# Patient Record
Sex: FEMALE | Race: WHITE | NOT HISPANIC OR LATINO | Employment: FULL TIME | URBAN - METROPOLITAN AREA
[De-identification: names, ages, dates, MRNs, and addresses within clinical notes are randomized per-mention and may not be internally consistent; named-entity substitution may affect disease eponyms.]

---

## 2018-10-29 ENCOUNTER — OFFICE VISIT (OUTPATIENT)
Dept: FAMILY MEDICINE | Facility: CLINIC | Age: 43
End: 2018-10-29
Payer: COMMERCIAL

## 2018-10-29 VITALS
DIASTOLIC BLOOD PRESSURE: 61 MMHG | SYSTOLIC BLOOD PRESSURE: 134 MMHG | OXYGEN SATURATION: 99 % | RESPIRATION RATE: 18 BRPM | HEART RATE: 87 BPM | WEIGHT: 221.63 LBS | HEIGHT: 59 IN | BODY MASS INDEX: 44.68 KG/M2 | TEMPERATURE: 99 F

## 2018-10-29 DIAGNOSIS — Z12.39 SCREENING FOR MALIGNANT NEOPLASM OF BREAST: ICD-10-CM

## 2018-10-29 DIAGNOSIS — Z79.899 ENCOUNTER FOR LONG-TERM CURRENT USE OF MEDICATION: ICD-10-CM

## 2018-10-29 DIAGNOSIS — R60.0 BILATERAL LOWER EXTREMITY EDEMA: Primary | ICD-10-CM

## 2018-10-29 PROCEDURE — 99203 OFFICE O/P NEW LOW 30 MIN: CPT | Mod: S$GLB,,, | Performed by: FAMILY MEDICINE

## 2018-10-29 PROCEDURE — 3008F BODY MASS INDEX DOCD: CPT | Mod: CPTII,S$GLB,, | Performed by: FAMILY MEDICINE

## 2018-10-29 NOTE — PROGRESS NOTES
Subjective:       Patient ID: Nereida Narayanan is a 43 y.o. female.    Chief Complaint: Establish Care    HPI   Patient presents to establish care. Complains of bilateral leg swelling for several years, worse over the past year since starting to work 16 hour days on her feet all day. Denies any shortness of breath or any significant change in her weight. Does not pay attention to salt, sugar, or fat intake and works in fast food services.    Has never had a mammogram. Does not want a flu shot today.     Review of Systems   Constitutional: Negative for chills, fatigue, fever and unexpected weight change.   Respiratory: Negative for cough, chest tightness, shortness of breath and wheezing.    Cardiovascular: Positive for leg swelling. Negative for chest pain and palpitations.   Neurological: Negative for dizziness, tremors, seizures and headaches.       History reviewed. No pertinent past medical history.  Past Surgical History:   Procedure Laterality Date     SECTION       Social History     Socioeconomic History    Marital status: Single     Spouse name: Not on file    Number of children: Not on file    Years of education: Not on file    Highest education level: Not on file   Social Needs    Financial resource strain: Not on file    Food insecurity - worry: Not on file    Food insecurity - inability: Not on file    Transportation needs - medical: Not on file    Transportation needs - non-medical: Not on file   Occupational History    Not on file   Tobacco Use    Smoking status: Never Smoker    Smokeless tobacco: Never Used   Substance and Sexual Activity    Alcohol use: No     Frequency: Never    Drug use: No    Sexual activity: No   Other Topics Concern    Not on file   Social History Narrative    Not on file     Family History   Problem Relation Age of Onset    Hypertension Mother        Objective:      /61 (BP Location: Right arm, Patient Position: Sitting, BP Method: Medium  "(Automatic))   Pulse 87   Temp 98.6 °F (37 °C) (Oral)   Resp 18   Ht 4' 11" (1.499 m)   Wt 100.5 kg (221 lb 9.6 oz)   LMP 10/08/2018 (Exact Date)   SpO2 99%   BMI 44.76 kg/m²   Physical Exam   Constitutional: She is oriented to person, place, and time. She appears well-developed and well-nourished. No distress.   obese   Cardiovascular: Normal rate, regular rhythm and normal heart sounds.   No murmur heard.  Pulmonary/Chest: Effort normal and breath sounds normal. No stridor. No respiratory distress.   Neurological: She is alert and oriented to person, place, and time.   Skin: She is not diaphoretic.   Psychiatric: She has a normal mood and affect. Her behavior is normal. Judgment and thought content normal.   Vitals reviewed.      Assessment:       1. Bilateral lower extremity edema    2. Encounter for long-term current use of medication    3. Screening for malignant neoplasm of breast        Plan:       Bilateral lower extremity edema  - suspect this is due to varicose veins; patient encouraged to wear compression stockings to work  - contact patient when fasting labs have been resulted    Encounter for long-term current use of medication  -     Lipid panel; Future; Expected date: 10/29/2018  -     Comprehensive metabolic panel; Future; Expected date: 10/29/2018  -     TSH; Future; Expected date: 10/29/2018  -     T4, free; Future; Expected date: 10/29/2018  -     CBC auto differential; Future; Expected date: 10/29/2018    Screening for malignant neoplasm of breast  -  -     Mammo Digital Screening Bilateral With CAD; Future; Expected date: 10/29/2018            Risks, benefits, and side effects were discussed with the patient. All questions were answered to the fullest satisfaction of the patient, and pt verbalized understanding and agreement to treatment plan. Pt was to call with any new or worsening symptoms, or present to the ER.    "

## 2018-10-30 ENCOUNTER — LAB VISIT (OUTPATIENT)
Dept: LAB | Facility: HOSPITAL | Age: 43
End: 2018-10-30
Attending: FAMILY MEDICINE
Payer: COMMERCIAL

## 2018-10-30 DIAGNOSIS — Z79.899 ENCOUNTER FOR LONG-TERM CURRENT USE OF MEDICATION: ICD-10-CM

## 2018-10-30 LAB
ALBUMIN SERPL BCP-MCNC: 3.7 G/DL
ALP SERPL-CCNC: 47 U/L
ALT SERPL W/O P-5'-P-CCNC: 21 U/L
ANION GAP SERPL CALC-SCNC: 7 MMOL/L
AST SERPL-CCNC: 21 U/L
BASOPHILS # BLD AUTO: 0.07 K/UL
BASOPHILS NFR BLD: 1.1 %
BILIRUB SERPL-MCNC: 0.3 MG/DL
BUN SERPL-MCNC: 17 MG/DL
CALCIUM SERPL-MCNC: 9.4 MG/DL
CHLORIDE SERPL-SCNC: 105 MMOL/L
CHOLEST SERPL-MCNC: 187 MG/DL
CHOLEST/HDLC SERPL: 4.6 {RATIO}
CO2 SERPL-SCNC: 24 MMOL/L
CREAT SERPL-MCNC: 0.6 MG/DL
DIFFERENTIAL METHOD: ABNORMAL
EOSINOPHIL # BLD AUTO: 0.3 K/UL
EOSINOPHIL NFR BLD: 4 %
ERYTHROCYTE [DISTWIDTH] IN BLOOD BY AUTOMATED COUNT: 13.6 %
EST. GFR  (AFRICAN AMERICAN): >60 ML/MIN/1.73 M^2
EST. GFR  (NON AFRICAN AMERICAN): >60 ML/MIN/1.73 M^2
GLUCOSE SERPL-MCNC: 83 MG/DL
HCT VFR BLD AUTO: 36.1 %
HDLC SERPL-MCNC: 41 MG/DL
HDLC SERPL: 21.9 %
HGB BLD-MCNC: 11.7 G/DL
IMM GRANULOCYTES # BLD AUTO: 0.04 K/UL
IMM GRANULOCYTES NFR BLD AUTO: 0.6 %
LDLC SERPL CALC-MCNC: 123.2 MG/DL
LYMPHOCYTES # BLD AUTO: 2.3 K/UL
LYMPHOCYTES NFR BLD: 34.6 %
MCH RBC QN AUTO: 28.1 PG
MCHC RBC AUTO-ENTMCNC: 32.4 G/DL
MCV RBC AUTO: 87 FL
MONOCYTES # BLD AUTO: 0.5 K/UL
MONOCYTES NFR BLD: 7.1 %
NEUTROPHILS # BLD AUTO: 3.4 K/UL
NEUTROPHILS NFR BLD: 52.6 %
NONHDLC SERPL-MCNC: 146 MG/DL
NRBC BLD-RTO: 0 /100 WBC
PLATELET # BLD AUTO: 284 K/UL
PMV BLD AUTO: 10.7 FL
POTASSIUM SERPL-SCNC: 3.9 MMOL/L
PROT SERPL-MCNC: 7.3 G/DL
RBC # BLD AUTO: 4.16 M/UL
SODIUM SERPL-SCNC: 136 MMOL/L
T4 FREE SERPL-MCNC: 0.9 NG/DL
TRIGL SERPL-MCNC: 114 MG/DL
TSH SERPL DL<=0.005 MIU/L-ACNC: 3.78 UIU/ML
WBC # BLD AUTO: 6.51 K/UL

## 2018-10-30 PROCEDURE — 80053 COMPREHEN METABOLIC PANEL: CPT

## 2018-10-30 PROCEDURE — 36415 COLL VENOUS BLD VENIPUNCTURE: CPT

## 2018-10-30 PROCEDURE — 85025 COMPLETE CBC W/AUTO DIFF WBC: CPT

## 2018-10-30 PROCEDURE — 84443 ASSAY THYROID STIM HORMONE: CPT

## 2018-10-30 PROCEDURE — 80061 LIPID PANEL: CPT

## 2018-10-30 PROCEDURE — 84439 ASSAY OF FREE THYROXINE: CPT

## 2018-12-11 ENCOUNTER — OFFICE VISIT (OUTPATIENT)
Dept: FAMILY MEDICINE | Facility: CLINIC | Age: 43
End: 2018-12-11
Payer: COMMERCIAL

## 2018-12-11 VITALS
BODY MASS INDEX: 44.92 KG/M2 | OXYGEN SATURATION: 98 % | TEMPERATURE: 98 F | RESPIRATION RATE: 18 BRPM | SYSTOLIC BLOOD PRESSURE: 135 MMHG | HEIGHT: 59 IN | WEIGHT: 222.81 LBS | DIASTOLIC BLOOD PRESSURE: 69 MMHG | HEART RATE: 75 BPM

## 2018-12-11 DIAGNOSIS — D50.9 IRON DEFICIENCY ANEMIA, UNSPECIFIED IRON DEFICIENCY ANEMIA TYPE: Primary | ICD-10-CM

## 2018-12-11 DIAGNOSIS — I87.2 VENOUS INSUFFICIENCY OF BOTH LOWER EXTREMITIES: ICD-10-CM

## 2018-12-11 PROCEDURE — 99213 OFFICE O/P EST LOW 20 MIN: CPT | Mod: S$GLB,,, | Performed by: FAMILY MEDICINE

## 2018-12-11 PROCEDURE — 3008F BODY MASS INDEX DOCD: CPT | Mod: CPTII,S$GLB,, | Performed by: FAMILY MEDICINE

## 2018-12-11 NOTE — PROGRESS NOTES
"Subjective:       Patient ID: Nereida Narayanan is a 43 y.o. female.    Chief Complaint: Follow-up    HPI   Ms. Narayanan presents for follow-up. Reports some improvement in her swelling with compression stockings, which she wears most days. Still without chest pain and shortness of breath. Labs reviewed with patient - hgb and hct are low, and she has needed to take iron in the past. Not currently taking any iron.    Review of Systems   Constitutional: Negative for diaphoresis, fatigue, fever and unexpected weight change.   Respiratory: Negative for cough, shortness of breath, wheezing and stridor.    Cardiovascular: Positive for leg swelling. Negative for chest pain and palpitations.       Past Medical History:   Diagnosis Date    Morbid obesity with BMI of 45.0-49.9, adult      Past Surgical History:   Procedure Laterality Date     SECTION       Social History     Socioeconomic History    Marital status: Single     Spouse name: Not on file    Number of children: Not on file    Years of education: Not on file    Highest education level: Not on file   Social Needs    Financial resource strain: Not on file    Food insecurity - worry: Not on file    Food insecurity - inability: Not on file    Transportation needs - medical: Not on file    Transportation needs - non-medical: Not on file   Occupational History    Not on file   Tobacco Use    Smoking status: Never Smoker    Smokeless tobacco: Never Used   Substance and Sexual Activity    Alcohol use: No     Frequency: Never    Drug use: No    Sexual activity: No   Other Topics Concern    Not on file   Social History Narrative    Not on file     Family History   Problem Relation Age of Onset    Hypertension Mother        Objective:      /69 (BP Location: Right arm, Patient Position: Sitting, BP Method: Medium (Automatic))   Pulse 75   Temp 98.1 °F (36.7 °C) (Oral)   Resp 18   Ht 4' 11" (1.499 m)   Wt 101.1 kg (222 lb 12.8 oz)   LMP  " (LMP Unknown)   SpO2 98%   BMI 45.00 kg/m²   Physical Exam   Constitutional: She is oriented to person, place, and time. She appears well-developed and well-nourished. No distress.   Morbidly obese   Eyes: Conjunctivae and EOM are normal. Pupils are equal, round, and reactive to light. No scleral icterus.   Pulmonary/Chest: Effort normal and breath sounds normal. No stridor. No respiratory distress.   Neurological: She is alert and oriented to person, place, and time.   Skin: She is not diaphoretic.   Psychiatric: She has a normal mood and affect. Her behavior is normal. Judgment and thought content normal.   Vitals reviewed.      Assessment:       1. Iron deficiency anemia, unspecified iron deficiency anemia type    2. Venous insufficiency of both lower extremities        Plan:       Iron deficiency anemia, unspecified iron deficiency anemia type  - patient to start taking otc iron and add a stool softener if needed    Venous insufficiency of both lower extremities  -     Ultrasound Lower Extremity Veins Bilateral Insuf (Cupid Only); Future  -     Continue wearing compression stockings            Risks, benefits, and side effects were discussed with the patient. All questions were answered to the fullest satisfaction of the patient, and pt verbalized understanding and agreement to treatment plan. Pt was to call with any new or worsening symptoms, or present to the ER.

## 2018-12-13 ENCOUNTER — PATIENT MESSAGE (OUTPATIENT)
Dept: FAMILY MEDICINE | Facility: CLINIC | Age: 43
End: 2018-12-13

## 2018-12-13 DIAGNOSIS — R60.9 EDEMA, UNSPECIFIED TYPE: Primary | ICD-10-CM

## 2018-12-14 ENCOUNTER — PATIENT MESSAGE (OUTPATIENT)
Dept: FAMILY MEDICINE | Facility: CLINIC | Age: 43
End: 2018-12-14

## 2018-12-14 RX ORDER — FUROSEMIDE 20 MG/1
20 TABLET ORAL DAILY PRN
Qty: 30 TABLET | Refills: 1 | Status: SHIPPED | OUTPATIENT
Start: 2018-12-14 | End: 2020-07-09 | Stop reason: SDUPTHER

## 2019-01-15 ENCOUNTER — TELEPHONE (OUTPATIENT)
Dept: FAMILY MEDICINE | Facility: CLINIC | Age: 44
End: 2019-01-15

## 2020-01-15 ENCOUNTER — CLINICAL SUPPORT (OUTPATIENT)
Dept: INTERNAL MEDICINE | Facility: CLINIC | Age: 45
End: 2020-01-15

## 2020-01-15 DIAGNOSIS — Z02.1 DRUG TESTING, PRE-EMPLOYMENT: Primary | ICD-10-CM

## 2020-01-15 PROCEDURE — 80305 PR NON-DOT DRUG SCREENS: ICD-10-PCS | Mod: ,,, | Performed by: NURSE PRACTITIONER

## 2020-01-15 PROCEDURE — 80305 DRUG TEST PRSMV DIR OPT OBS: CPT | Mod: ,,, | Performed by: NURSE PRACTITIONER

## 2020-05-20 ENCOUNTER — PATIENT MESSAGE (OUTPATIENT)
Dept: ADMINISTRATIVE | Facility: HOSPITAL | Age: 45
End: 2020-05-20

## 2020-06-01 DIAGNOSIS — Z12.39 BREAST CANCER SCREENING: ICD-10-CM

## 2020-06-25 ENCOUNTER — PATIENT OUTREACH (OUTPATIENT)
Dept: ADMINISTRATIVE | Facility: HOSPITAL | Age: 45
End: 2020-06-25

## 2020-06-25 NOTE — LETTER
June 25, 2020    Nereida Narayanan  [address] [facility] [place] [address]             [facility]  [address]  [facility] [address]  Phone: [phone] Dear Jessica [facility] is committed to your overall health and would like to ensure that you are up to date on your recommended test and/or procedures.   Neeru Blackwell DO  has found that your chart shows you may be due for the following:    Health Maintenance Due   Topic Date Due    HIV Screening  05/09/1992    TETANUS VACCINE  05/09/1995    Cervical Cancer Screening  05/09/1998    Mammogram  05/09/2017     If you have had any of the above done at another facility, please let us know so that we may obtain copies from that facility.  If you have a copy of these records, please provide a copy for us to scan into your chart.  You are welcome to request that the report be faxed to us at  ([phone]).     Otherwise, please schedule these appointments at your earliest convenience by calling [phone] or going to [email or web address].    If you have an upcoming scheduled appointment for the item above, please disregard this letter.    Sincerely,  Your [facility]  DO Love Pope L.P.N. Clinical Care Coordinator  [address] [place] [place] [address] [phone]

## 2020-07-09 ENCOUNTER — HOSPITAL ENCOUNTER (OUTPATIENT)
Dept: RADIOLOGY | Facility: HOSPITAL | Age: 45
Discharge: HOME | End: 2020-07-09
Attending: NURSE PRACTITIONER
Payer: COMMERCIAL

## 2020-07-09 ENCOUNTER — OFFICE VISIT (OUTPATIENT)
Dept: FAMILY MEDICINE | Facility: CLINIC | Age: 45
End: 2020-07-09
Payer: COMMERCIAL

## 2020-07-09 ENCOUNTER — PATIENT MESSAGE (OUTPATIENT)
Dept: FAMILY MEDICINE | Facility: CLINIC | Age: 45
End: 2020-07-09

## 2020-07-09 VITALS
HEART RATE: 76 BPM | WEIGHT: 239 LBS | OXYGEN SATURATION: 95 % | TEMPERATURE: 98 F | HEIGHT: 59 IN | RESPIRATION RATE: 18 BRPM | SYSTOLIC BLOOD PRESSURE: 118 MMHG | BODY MASS INDEX: 48.18 KG/M2 | DIASTOLIC BLOOD PRESSURE: 68 MMHG

## 2020-07-09 DIAGNOSIS — Z13.220 ENCOUNTER FOR LIPID SCREENING FOR CARDIOVASCULAR DISEASE: ICD-10-CM

## 2020-07-09 DIAGNOSIS — R60.0 BILATERAL LOWER EXTREMITY EDEMA: ICD-10-CM

## 2020-07-09 DIAGNOSIS — Z13.6 ENCOUNTER FOR LIPID SCREENING FOR CARDIOVASCULAR DISEASE: ICD-10-CM

## 2020-07-09 DIAGNOSIS — Z00.00 ROUTINE PHYSICAL EXAMINATION: ICD-10-CM

## 2020-07-09 DIAGNOSIS — M71.22 SYNOVIAL CYST OF LEFT POPLITEAL SPACE: Primary | ICD-10-CM

## 2020-07-09 DIAGNOSIS — Z11.4 SCREENING FOR HIV (HUMAN IMMUNODEFICIENCY VIRUS): Primary | ICD-10-CM

## 2020-07-09 PROCEDURE — 93922 US ARTERIAL LOWER EXTREMITY BILAT WITH ABI (XPD): ICD-10-PCS | Mod: 26,,, | Performed by: RADIOLOGY

## 2020-07-09 PROCEDURE — 99999 PR PBB SHADOW E&M-EST. PATIENT-LVL IV: ICD-10-PCS | Mod: PBBFAC,,, | Performed by: NURSE PRACTITIONER

## 2020-07-09 PROCEDURE — 93925 US ARTERIAL LOWER EXTREMITY BILAT WITH ABI (XPD): ICD-10-PCS | Mod: 26,,, | Performed by: RADIOLOGY

## 2020-07-09 PROCEDURE — 93970 EXTREMITY STUDY: CPT | Mod: 26,,, | Performed by: RADIOLOGY

## 2020-07-09 PROCEDURE — 93922 UPR/L XTREMITY ART 2 LEVELS: CPT | Mod: TC,PN

## 2020-07-09 PROCEDURE — 99214 PR OFFICE/OUTPT VISIT, EST, LEVL IV, 30-39 MIN: ICD-10-PCS | Mod: S$GLB,,, | Performed by: NURSE PRACTITIONER

## 2020-07-09 PROCEDURE — 99999 PR PBB SHADOW E&M-EST. PATIENT-LVL IV: CPT | Mod: PBBFAC,,, | Performed by: NURSE PRACTITIONER

## 2020-07-09 PROCEDURE — 93970 EXTREMITY STUDY: CPT | Mod: TC,PN

## 2020-07-09 PROCEDURE — 93922 UPR/L XTREMITY ART 2 LEVELS: CPT | Mod: 26,,, | Performed by: RADIOLOGY

## 2020-07-09 PROCEDURE — 93970 US LOWER EXTREMITY VEINS BILATERAL: ICD-10-PCS | Mod: 26,,, | Performed by: RADIOLOGY

## 2020-07-09 PROCEDURE — 99214 OFFICE O/P EST MOD 30 MIN: CPT | Mod: S$GLB,,, | Performed by: NURSE PRACTITIONER

## 2020-07-09 PROCEDURE — 93925 LOWER EXTREMITY STUDY: CPT | Mod: 26,,, | Performed by: RADIOLOGY

## 2020-07-09 RX ORDER — FUROSEMIDE 20 MG/1
20 TABLET ORAL DAILY PRN
Qty: 30 TABLET | Refills: 1 | Status: SHIPPED | OUTPATIENT
Start: 2020-07-09 | End: 2021-03-25 | Stop reason: SDUPTHER

## 2020-07-09 NOTE — PROGRESS NOTES
"Subjective:       Patient ID: Nereida Narayanan is a 45 y.o. female.    Chief Complaint: Joint Swelling (ankles and feet)    Ms. Nereida Narayanan is a 45 year old female who presents to the clinic today for lower leg edema. She has a history of this. She has taken lasix in the past, which was partially relieved her swelling. Reports leg in her right leg. Reports on her feet constantly. Reports she does exercise. Denies cp or sob. Denies chest pain or tightness.     Review of Systems   Constitutional: Negative for activity change, appetite change, fatigue and fever.   Respiratory: Negative for cough, chest tightness, shortness of breath and wheezing.    Cardiovascular: Positive for leg swelling. Negative for chest pain and palpitations.   Gastrointestinal: Negative for abdominal pain, diarrhea, nausea and vomiting.   Musculoskeletal: Negative for arthralgias and myalgias.   Skin: Negative for color change.   Neurological: Negative for dizziness, tremors, syncope, weakness and headaches.   Psychiatric/Behavioral: Negative for dysphoric mood and sleep disturbance.         Reviewed family, medical, surgical, and social history.    Objective:      /68 (BP Location: Left arm, Patient Position: Sitting, BP Method: Medium (Automatic))   Pulse 76   Temp 97.6 °F (36.4 °C) (Tympanic)   Resp 18   Ht 4' 11" (1.499 m)   Wt 108.4 kg (239 lb)   SpO2 95%   BMI 48.27 kg/m²   Physical Exam  Vitals signs reviewed.   Constitutional:       General: She is not in acute distress.     Appearance: She is well-developed. She is not diaphoretic.      Comments: obese   HENT:      Head: Normocephalic.      Right Ear: Tympanic membrane normal.      Left Ear: Tympanic membrane normal.      Nose: Nose normal.      Mouth/Throat:      Mouth: Mucous membranes are moist.      Pharynx: Oropharynx is clear.   Eyes:      Conjunctiva/sclera: Conjunctivae normal.      Pupils: Pupils are equal, round, and reactive to light.   Neck:      " Musculoskeletal: Normal range of motion.   Cardiovascular:      Rate and Rhythm: Normal rate and regular rhythm.      Pulses: Normal pulses.      Heart sounds: Normal heart sounds. No murmur.   Pulmonary:      Effort: Pulmonary effort is normal. No respiratory distress.      Breath sounds: Normal breath sounds. No stridor.   Abdominal:      General: Bowel sounds are normal.      Tenderness: There is no abdominal tenderness.   Musculoskeletal: Normal range of motion.   Skin:     General: Skin is warm.      Capillary Refill: Capillary refill takes less than 2 seconds.   Neurological:      General: No focal deficit present.      Mental Status: She is alert and oriented to person, place, and time.   Psychiatric:         Mood and Affect: Mood normal.         Behavior: Behavior normal.         Thought Content: Thought content normal.         Judgment: Judgment normal.         Assessment:       1. Screening for HIV (human immunodeficiency virus)    2. Bilateral lower extremity edema    3. Routine physical examination    4. Encounter for lipid screening for cardiovascular disease        Plan:       Screening for HIV (human immunodeficiency virus)  -     HIV 1/2 Ag/Ab (4th Gen); Future; Expected date: 12/25/2020    Bilateral lower extremity edema  -     US Lower Extrem Arteries Bilat with CRIS; Future; Expected date: 07/09/2020  -     US Lower Extremity Veins Bilateral; Future; Expected date: 07/09/2020  -     furosemide (LASIX) 20 MG tablet; Take 1 tablet (20 mg total) by mouth daily as needed (leg swelling).  Dispense: 30 tablet; Refill: 1    Routine physical examination  -     CBC auto differential; Future; Expected date: 07/16/2020  -     Comprehensive metabolic panel; Future; Expected date: 07/09/2020  -     TSH; Future; Expected date: 07/09/2020  -     Lipid Panel; Future; Expected date: 07/09/2020    Encounter for lipid screening for cardiovascular disease  -     Lipid Panel; Future; Expected date: 07/09/2020           PLAN:  - Discussed with patient the plan of care  - Obtain US  - Restart lasix  - Medications reviewed. Medication side effects discussed. Patient has no questions or concerns at this time. Informed patient to notify me regarding any concerns.   - Informed patient to please notify me with any questions or concerns at anytime  - Follow up ordered for well woman exam      Risks, benefits, and side effects were discussed with the patient. All questions were answered to the fullest satisfaction of the patient, and pt verbalized understanding and agreement to treatment plan. Pt was to call with any new or worsening symptoms, or present to the ER.

## 2020-07-09 NOTE — PROGRESS NOTES
Jules, her US of her legs are normal. Please have her obtain her labs, and then at her f/u for lab review we can discuss weight loss options  Thanks

## 2020-07-09 NOTE — PROGRESS NOTES
Please let Ms Oracio know that her US showed normal venous blood flow, no clots. However, it did show a popliteal cyst behind her left knee. I am going to refer to ortho and see if this can be injected or aspirated for relief  Thanks

## 2020-07-10 ENCOUNTER — TELEPHONE (OUTPATIENT)
Dept: ORTHOPEDICS | Facility: CLINIC | Age: 45
End: 2020-07-10

## 2020-07-15 ENCOUNTER — TELEPHONE (OUTPATIENT)
Dept: ORTHOPEDICS | Facility: CLINIC | Age: 45
End: 2020-07-15

## 2020-07-23 ENCOUNTER — OFFICE VISIT (OUTPATIENT)
Dept: FAMILY MEDICINE | Facility: CLINIC | Age: 45
End: 2020-07-23
Payer: COMMERCIAL

## 2020-07-23 VITALS
BODY MASS INDEX: 48.99 KG/M2 | WEIGHT: 243 LBS | HEART RATE: 88 BPM | TEMPERATURE: 97 F | OXYGEN SATURATION: 98 % | SYSTOLIC BLOOD PRESSURE: 134 MMHG | DIASTOLIC BLOOD PRESSURE: 74 MMHG | RESPIRATION RATE: 18 BRPM | HEIGHT: 59 IN

## 2020-07-23 DIAGNOSIS — Z01.419 WELL WOMAN EXAM WITH ROUTINE GYNECOLOGICAL EXAM: Primary | ICD-10-CM

## 2020-07-23 DIAGNOSIS — Z71.3 WEIGHT LOSS COUNSELING, ENCOUNTER FOR: ICD-10-CM

## 2020-07-23 PROCEDURE — 99999 PR PBB SHADOW E&M-EST. PATIENT-LVL III: CPT | Mod: PBBFAC,,, | Performed by: NURSE PRACTITIONER

## 2020-07-23 PROCEDURE — 87624 HPV HI-RISK TYP POOLED RSLT: CPT

## 2020-07-23 PROCEDURE — 99396 PR PREVENTIVE VISIT,EST,40-64: ICD-10-PCS | Mod: S$GLB,,, | Performed by: NURSE PRACTITIONER

## 2020-07-23 PROCEDURE — 99999 PR PBB SHADOW E&M-EST. PATIENT-LVL III: ICD-10-PCS | Mod: PBBFAC,,, | Performed by: NURSE PRACTITIONER

## 2020-07-23 PROCEDURE — 99396 PREV VISIT EST AGE 40-64: CPT | Mod: S$GLB,,, | Performed by: NURSE PRACTITIONER

## 2020-07-23 PROCEDURE — 88175 CYTOPATH C/V AUTO FLUID REDO: CPT

## 2020-07-23 RX ORDER — PHENTERMINE HYDROCHLORIDE 37.5 MG/1
37.5 TABLET ORAL
Qty: 30 TABLET | Refills: 0 | Status: SHIPPED | OUTPATIENT
Start: 2020-07-23 | End: 2020-08-18 | Stop reason: SDUPTHER

## 2020-07-23 NOTE — PROGRESS NOTES
"  Subjective:       Patient ID: Nereida Narayanan is a 45 y.o. female.    Chief Complaint: Gynecologic Exam    Ms. Nereida Narayanan is a 45 year old female who presents to the clinic today for well woman exam. No complaints. Not sexually active. LMP last month.     Patient requesting assistance with weight loss. BMI 49. She is going to the gym, and monitoring her diet. Patient requesting assistance with weight loss    HM:  Mammogram ordered and scheduled  - Labs: ordered    Gynecologic Exam  The patient's pertinent negatives include no genital itching, genital lesions, genital odor, genital rash, missed menses, pelvic pain, vaginal bleeding or vaginal discharge. Pertinent negatives include no abdominal pain, diarrhea, fever, headaches or vomiting.     Review of Systems   Constitutional: Negative for activity change, diaphoresis and fever.   Respiratory: Negative for cough, chest tightness, shortness of breath and wheezing.    Cardiovascular: Positive for leg swelling. Negative for chest pain and palpitations.   Gastrointestinal: Negative for abdominal pain, diarrhea and vomiting.   Genitourinary: Negative for missed menses, pelvic pain and vaginal discharge.   Musculoskeletal: Negative for arthralgias and myalgias.   Skin: Negative for color change.   Neurological: Negative for weakness and headaches.   Psychiatric/Behavioral: Negative for decreased concentration and sleep disturbance.         Reviewed family, medical, surgical, and social history.    Objective:      /74 (BP Location: Left arm, Patient Position: Sitting, BP Method: Medium (Automatic))   Pulse 88   Temp 97.4 °F (36.3 °C) (Tympanic)   Resp 18   Ht 4' 11" (1.499 m)   Wt 110.2 kg (243 lb)   SpO2 98%   BMI 49.08 kg/m²   Physical Exam  HENT:      Head: Normocephalic.      Nose: Nose normal.   Eyes:      Pupils: Pupils are equal, round, and reactive to light.   Neck:      Musculoskeletal: Normal range of motion.   Cardiovascular:      Rate and " Rhythm: Normal rate.      Pulses: Normal pulses.   Pulmonary:      Effort: Pulmonary effort is normal.      Breath sounds: No wheezing.   Abdominal:      General: Bowel sounds are normal.      Tenderness: There is no abdominal tenderness.      Hernia: There is no hernia in the left inguinal area or right inguinal area.   Genitourinary:     Labia:         Right: No rash, tenderness, lesion or injury.         Left: No rash, tenderness, lesion or injury.       Vagina: Normal.      Cervix: Cervical bleeding present.      Uterus: Normal.       Adnexa: Right adnexa normal.   Skin:     General: Skin is warm.      Capillary Refill: Capillary refill takes less than 2 seconds.   Neurological:      General: No focal deficit present.      Mental Status: She is alert.   Psychiatric:         Mood and Affect: Mood normal.         Behavior: Behavior normal.         Thought Content: Thought content normal.         Judgment: Judgment normal.         Assessment:       1. Well woman exam with routine gynecological exam    2. Weight loss counseling, encounter for        Plan:       Well woman exam with routine gynecological exam  -     Liquid-Based Pap Smear, Screening  -     HPV High Risk Genotypes, PCR    Weight loss counseling, encounter for  -     phentermine (ADIPEX-P) 37.5 mg tablet; Take 1 tablet (37.5 mg total) by mouth before breakfast.  Dispense: 30 tablet; Refill: 0          PLAN:  - Discussed with patient the plan of care  - Pap completed and patient tolerated well  -  reviewed  - Medications reviewed. Medication side effects discussed. Patient has no questions or concerns at this time. Informed patient to notify me regarding any concerns.   - Diet, exercise, and life style change reviewed.   - US of legs reviewed  - Labs reviewed  - Encourage patient to wear compression stockings  - Informed patient to please notify me with any questions or concerns at anytime  - Follow up 4 weeks      Risks, benefits, and side effects  were discussed with the patient. All questions were answered to the fullest satisfaction of the patient, and pt verbalized understanding and agreement to treatment plan. Pt was to call with any new or worsening symptoms, or present to the ER.

## 2020-07-29 LAB
FINAL PATHOLOGIC DIAGNOSIS: NORMAL
Lab: NORMAL

## 2020-07-31 LAB
HPV HR 12 DNA SPEC QL NAA+PROBE: NEGATIVE
HPV16 AG SPEC QL: NEGATIVE
HPV18 DNA SPEC QL NAA+PROBE: NEGATIVE

## 2020-08-03 DIAGNOSIS — M25.562 LEFT KNEE PAIN, UNSPECIFIED CHRONICITY: Primary | ICD-10-CM

## 2020-08-06 ENCOUNTER — PATIENT OUTREACH (OUTPATIENT)
Dept: ADMINISTRATIVE | Facility: OTHER | Age: 45
End: 2020-08-06

## 2020-08-06 ENCOUNTER — TELEPHONE (OUTPATIENT)
Dept: ORTHOPEDICS | Facility: CLINIC | Age: 45
End: 2020-08-06

## 2020-08-06 NOTE — TELEPHONE ENCOUNTER
Patient was notified by Pt rep and was directed to financial call center for pricing. Will cancel scheduled appt for 8/7/20.

## 2020-08-06 NOTE — PROGRESS NOTES
Requested updates within Care Everywhere.  Patient's chart was reviewed for overdue JULIANA topics.  Immunizations reconciled.

## 2020-08-07 ENCOUNTER — HOSPITAL ENCOUNTER (OUTPATIENT)
Dept: RADIOLOGY | Facility: HOSPITAL | Age: 45
Discharge: HOME | End: 2020-08-07
Attending: ORTHOPAEDIC SURGERY
Payer: COMMERCIAL

## 2020-08-07 ENCOUNTER — OFFICE VISIT (OUTPATIENT)
Dept: ORTHOPEDICS | Facility: CLINIC | Age: 45
End: 2020-08-07
Payer: COMMERCIAL

## 2020-08-07 VITALS — TEMPERATURE: 98 F | BODY MASS INDEX: 48.99 KG/M2 | WEIGHT: 243 LBS | RESPIRATION RATE: 18 BRPM | HEIGHT: 59 IN

## 2020-08-07 DIAGNOSIS — M71.22 SYNOVIAL CYST OF LEFT POPLITEAL SPACE: ICD-10-CM

## 2020-08-07 DIAGNOSIS — M25.562 LEFT KNEE PAIN, UNSPECIFIED CHRONICITY: ICD-10-CM

## 2020-08-07 DIAGNOSIS — M71.21 SYNOVIAL CYST OF POPLITEAL SPACE (BAKER), RIGHT KNEE: ICD-10-CM

## 2020-08-07 DIAGNOSIS — M23.304 BILATERAL DERANGEMENT OF MEDIAL MENISCUS: Primary | ICD-10-CM

## 2020-08-07 DIAGNOSIS — M17.0 PRIMARY OSTEOARTHRITIS OF BOTH KNEES: ICD-10-CM

## 2020-08-07 DIAGNOSIS — M23.303 BILATERAL DERANGEMENT OF MEDIAL MENISCUS: Primary | ICD-10-CM

## 2020-08-07 PROCEDURE — 99203 OFFICE O/P NEW LOW 30 MIN: CPT | Mod: 25,S$GLB,, | Performed by: ORTHOPAEDIC SURGERY

## 2020-08-07 PROCEDURE — 20610 LARGE JOINT ASPIRATION/INJECTION: BILATERAL KNEE: ICD-10-PCS | Mod: 50,S$GLB,, | Performed by: ORTHOPAEDIC SURGERY

## 2020-08-07 PROCEDURE — 73562 XR KNEE 3 VIEW LEFT: ICD-10-PCS | Mod: 26,LT,, | Performed by: RADIOLOGY

## 2020-08-07 PROCEDURE — 99999 PR PBB SHADOW E&M-EST. PATIENT-LVL III: CPT | Mod: PBBFAC,,, | Performed by: ORTHOPAEDIC SURGERY

## 2020-08-07 PROCEDURE — 73562 X-RAY EXAM OF KNEE 3: CPT | Mod: TC,FY,LT

## 2020-08-07 PROCEDURE — 73562 X-RAY EXAM OF KNEE 3: CPT | Mod: 26,LT,, | Performed by: RADIOLOGY

## 2020-08-07 PROCEDURE — 99999 PR PBB SHADOW E&M-EST. PATIENT-LVL III: ICD-10-PCS | Mod: PBBFAC,,, | Performed by: ORTHOPAEDIC SURGERY

## 2020-08-07 PROCEDURE — 20610 DRAIN/INJ JOINT/BURSA W/O US: CPT | Mod: 50,S$GLB,, | Performed by: ORTHOPAEDIC SURGERY

## 2020-08-07 PROCEDURE — 99203 PR OFFICE/OUTPT VISIT, NEW, LEVL III, 30-44 MIN: ICD-10-PCS | Mod: 25,S$GLB,, | Performed by: ORTHOPAEDIC SURGERY

## 2020-08-07 RX ORDER — TRIAMCINOLONE ACETONIDE 40 MG/ML
40 INJECTION, SUSPENSION INTRA-ARTICULAR; INTRAMUSCULAR
Status: DISCONTINUED | OUTPATIENT
Start: 2020-08-07 | End: 2020-08-07 | Stop reason: HOSPADM

## 2020-08-07 RX ADMIN — TRIAMCINOLONE ACETONIDE 40 MG: 40 INJECTION, SUSPENSION INTRA-ARTICULAR; INTRAMUSCULAR at 08:08

## 2020-08-07 NOTE — PROCEDURES
Large Joint Aspiration/Injection: bilateral knee    Date/Time: 8/7/2020 8:00 AM  Performed by: Aiden Clarke DO  Authorized by: Aiden Clarke, DO     Consent Done?:  Yes (Verbal)  Indications:  Arthritis, diagnostic evaluation, joint swelling and pain  Site marked: the procedure site was marked    Timeout: prior to procedure the correct patient, procedure, and site was verified    Prep: patient was prepped and draped in usual sterile fashion      Details:  Needle Size:  22 G  Ultrasonic Guidance for needle placement?: No    Approach:  Anterolateral  Location:  Knee  Laterality:  Bilateral  Site:  Bilateral knee  Medications (Right):  40 mg triamcinolone acetonide 40 mg/mL  Medications (Left):  40 mg triamcinolone acetonide 40 mg/mL  Patient tolerance:  Patient tolerated the procedure well with no immediate complications

## 2020-08-07 NOTE — PROGRESS NOTES
Subjective:      Patient ID: Nereida Narayanan is a 45 y.o. female.    Chief Complaint: Pain of the Left Knee    Referring Provider: Jaimee Bennett, Np  [address] [place] [address]    HPI:  Ms. Narayanan is a 45-year-old lady who presented today for evaluation of 8 years of bilateral knee pain, right greater than left increasing intensity over the last 6 months.  Walking stairs increases her symptoms while elevation and rest seems to improve them.  She did get swelling but denies giving way or locking.  She has taken NSAIDs with help.  She has not done physical therapy, worn a brace, nor had injections.    Past Medical History:   Diagnosis Date    Morbid obesity with BMI of 45.0-49.9, adult      Past Surgical History:   Procedure Laterality Date     SECTION         Review of patient's allergies indicates:  No Known Allergies    Social History     Occupational History    Manager   Tobacco Use    Smoking status: Never Smoker    Smokeless tobacco: Never Used   Substance and Sexual Activity    Alcohol use: No     Frequency: Never    Drug use: No    Sexual activity: Never      Family History   Problem Relation Age of Onset    Hypertension Mother        Previous Hospitalizations:  Childbirth.      ROS:   Review of Systems   Constitution: Negative for chills and fever.   HENT: Negative for congestion.    Eyes: Negative for blurred vision.   Cardiovascular: Negative for chest pain.   Respiratory: Negative for cough.    Endocrine: Negative for polydipsia.   Hematologic/Lymphatic: Negative for adenopathy.   Skin: Negative for flushing and itching.   Musculoskeletal: Positive for joint pain and joint swelling. Negative for gout.   Gastrointestinal: Negative for constipation, diarrhea and heartburn.   Genitourinary: Negative for nocturia.   Neurological: Negative for headaches and seizures.   Psychiatric/Behavioral: Negative for depression and substance abuse. The patient is not  nervous/anxious.    Allergic/Immunologic: Negative for environmental allergies.           Objective:      Physical Exam:   General: AAOx3.  No acute distress  HEENT: Normocephalic, PEARLA EOMI, Good Dentition  Neck: Supple, No JVD  Chest: Symetric, equal excursion on inspiration  Abdomen: Soft NTND  Vascular:  Pulses intact and equal bilaterally.  Capillary refill less than 3 seconds and equal bilaterally  Neurologic:  Pinprick and soft touch intact and equal bilaterally  Integment:  No ecchymosis, no errythema  Extremity:  Knee:  Extension/flexion equal bilaterally 0/128 degrees.  Mild effusion both knees.  Mild crepitus with motion both knees.  Baker cyst, both knees.  Mildly positive patellar load/compression both knees.  Negative patella apprehension/relocation both knees.  Mild J-sign both knees.  Varus/valgus stressing equal bilaterally with endpoint.  Lachman's/drawer equal bilaterally with endpoint.  Positive medial joint line tenderness both knees.  Shasha mildly positive both knees.  Toribio positive both knees.  Nontender at the anserine insertion bilaterally.  No swelling at the anserine insertion bilaterally.  Radiography:  Personally reviewed x-rays of the left knee completed on 08/07/2020 showed tricompartmental arthritic changes with osteophytes off the medial femoral condyle and tibial plateau along with osteophytes off the patella.      Assessment:       Impression:      1. Bilateral derangement of medial meniscus    2. Primary osteoarthritis of both knees    3. Synovial cyst of left popliteal space    4. Synovial cyst of popliteal space (Serrato), right knee          Plan:       1.  Discussed physical examination and radiographic findings with the patient. Nereida understands that she has degenerative tears of the medial meniscus of both of her knees along with arthritis in her knees.  Treatment alternatives and outcomes were discussed with the patient.  She could continue with conservative  management such as NSAIDs, bracing, physical therapy, injections, or she could consider surgical intervention such as arthroscopy.  Since she has not completed conservative management recommend she trial conservative care and if she fails conservative care then consider surgical intervention.  2.  Offered a steroid injection to both knees, she elected to proceed.  3.  Discussed with the patient obtaining braces to wear on both knees when she is at work or exercising.  4.  Weight loss and exercise were discussed with the patient.  5.  Take NSAIDs as tolerated allowed by PCM.  6.  Home exercises to include quadriceps and hamstring strengthening were shown discussed.  7.  Discussed possible referral to physical therapy declined for now.  8.  [facility] portal was discussed with the patient and information was given.  The patient was encouraged to use the portal for future encounters.  9.  Follow up p.r.n..  X-ray right knee at follow-up

## 2020-08-07 NOTE — LETTER
August 7, 2020      Jaimee Bennett, NP  [address]  [place] [address]           [facility]  [address] [place] [address]  Phone: [phone]  Fax: [phone]          Patient: Nereida Narayanan   MR Number: [id number]   YOB: 1975   Date of Visit: 8/7/2020       Dear Jaimee Bennett:    Thank you for referring Nereida Narayanan to me for evaluation. Attached you will find relevant portions of my assessment and plan of care.    If you have questions, please do not hesitate to call me. I look forward to following Nereida Narayanan along with you.    Sincerely,    Aiden Clarke, DO    Enclosure  CC:  No Recipients    If you would like to receive this communication electronically, please contact [email or web address] or [phone] to request more information on [facility] access.    For providers and/or their staff who would like to refer a patient to [facility], please contact us through our one-stop-shop provider referral line, [facility] , at [phone].    If you feel you have received this communication in error or would no longer like to receive these types of communications, please e-mail [email or web address]          No

## 2020-08-14 ENCOUNTER — PATIENT MESSAGE (OUTPATIENT)
Dept: FAMILY MEDICINE | Facility: CLINIC | Age: 45
End: 2020-08-14

## 2020-08-14 DIAGNOSIS — Z71.3 WEIGHT LOSS COUNSELING, ENCOUNTER FOR: ICD-10-CM

## 2020-08-18 RX ORDER — PHENTERMINE HYDROCHLORIDE 37.5 MG/1
37.5 TABLET ORAL
Qty: 30 TABLET | Refills: 0 | Status: SHIPPED | OUTPATIENT
Start: 2020-08-18 | End: 2020-09-01 | Stop reason: SDUPTHER

## 2020-08-19 ENCOUNTER — PATIENT MESSAGE (OUTPATIENT)
Dept: FAMILY MEDICINE | Facility: CLINIC | Age: 45
End: 2020-08-19

## 2020-08-19 DIAGNOSIS — Z71.3 WEIGHT LOSS COUNSELING, ENCOUNTER FOR: ICD-10-CM

## 2020-08-19 RX ORDER — PHENTERMINE HYDROCHLORIDE 37.5 MG/1
37.5 TABLET ORAL
Qty: 30 TABLET | Refills: 0 | OUTPATIENT
Start: 2020-08-19 | End: 2020-09-18

## 2020-08-20 ENCOUNTER — HOSPITAL ENCOUNTER (OUTPATIENT)
Dept: RADIOLOGY | Facility: HOSPITAL | Age: 45
Discharge: HOME | End: 2020-08-20
Attending: FAMILY MEDICINE
Payer: COMMERCIAL

## 2020-08-20 ENCOUNTER — PATIENT MESSAGE (OUTPATIENT)
Dept: FAMILY MEDICINE | Facility: CLINIC | Age: 45
End: 2020-08-20

## 2020-08-20 VITALS — HEIGHT: 59 IN | BODY MASS INDEX: 48.89 KG/M2 | WEIGHT: 242.5 LBS

## 2020-08-20 DIAGNOSIS — Z12.39 BREAST CANCER SCREENING: ICD-10-CM

## 2020-08-20 PROCEDURE — 77067 MAMMO DIGITAL SCREENING BILAT WITH TOMOSYNTHESIS_CAD: ICD-10-PCS | Mod: 26,,, | Performed by: RADIOLOGY

## 2020-08-20 PROCEDURE — 77067 SCR MAMMO BI INCL CAD: CPT | Mod: TC

## 2020-08-20 PROCEDURE — 77067 SCR MAMMO BI INCL CAD: CPT | Mod: 26,,, | Performed by: RADIOLOGY

## 2020-08-20 PROCEDURE — 77063 MAMMO DIGITAL SCREENING BILAT WITH TOMOSYNTHESIS_CAD: ICD-10-PCS | Mod: 26,,, | Performed by: RADIOLOGY

## 2020-08-20 PROCEDURE — 77063 BREAST TOMOSYNTHESIS BI: CPT | Mod: 26,,, | Performed by: RADIOLOGY

## 2020-08-28 DIAGNOSIS — M25.561 ACUTE PAIN OF RIGHT KNEE: Primary | ICD-10-CM

## 2020-09-01 ENCOUNTER — HOSPITAL ENCOUNTER (OUTPATIENT)
Dept: RADIOLOGY | Facility: HOSPITAL | Age: 45
Discharge: HOME | End: 2020-09-01
Attending: ORTHOPAEDIC SURGERY
Payer: COMMERCIAL

## 2020-09-01 ENCOUNTER — OFFICE VISIT (OUTPATIENT)
Dept: ORTHOPEDICS | Facility: CLINIC | Age: 45
End: 2020-09-01
Payer: COMMERCIAL

## 2020-09-01 ENCOUNTER — OFFICE VISIT (OUTPATIENT)
Dept: FAMILY MEDICINE | Facility: CLINIC | Age: 45
End: 2020-09-01
Payer: COMMERCIAL

## 2020-09-01 VITALS
BODY MASS INDEX: 48.89 KG/M2 | HEIGHT: 59 IN | RESPIRATION RATE: 14 BRPM | OXYGEN SATURATION: 98 % | TEMPERATURE: 98 F | WEIGHT: 242.5 LBS | HEART RATE: 76 BPM

## 2020-09-01 VITALS
TEMPERATURE: 98 F | HEIGHT: 59 IN | OXYGEN SATURATION: 98 % | RESPIRATION RATE: 20 BRPM | WEIGHT: 229.19 LBS | SYSTOLIC BLOOD PRESSURE: 133 MMHG | BODY MASS INDEX: 46.2 KG/M2 | DIASTOLIC BLOOD PRESSURE: 79 MMHG | HEART RATE: 89 BPM

## 2020-09-01 DIAGNOSIS — S83.241A TEAR OF MEDIAL MENISCUS OF RIGHT KNEE, CURRENT, UNSPECIFIED TEAR TYPE, INITIAL ENCOUNTER: Primary | ICD-10-CM

## 2020-09-01 DIAGNOSIS — Z71.3 WEIGHT LOSS COUNSELING, ENCOUNTER FOR: ICD-10-CM

## 2020-09-01 DIAGNOSIS — Z01.818 PRE-OP TESTING: ICD-10-CM

## 2020-09-01 DIAGNOSIS — M71.22 SYNOVIAL CYST OF LEFT POPLITEAL SPACE: ICD-10-CM

## 2020-09-01 DIAGNOSIS — M23.304 BILATERAL DERANGEMENT OF MEDIAL MENISCUS: ICD-10-CM

## 2020-09-01 DIAGNOSIS — M17.0 PRIMARY OSTEOARTHRITIS OF BOTH KNEES: ICD-10-CM

## 2020-09-01 DIAGNOSIS — R60.0 BILATERAL LOWER EXTREMITY EDEMA: Primary | ICD-10-CM

## 2020-09-01 DIAGNOSIS — M71.21 SYNOVIAL CYST OF POPLITEAL SPACE (BAKER), RIGHT KNEE: ICD-10-CM

## 2020-09-01 DIAGNOSIS — M23.303 BILATERAL DERANGEMENT OF MEDIAL MENISCUS: ICD-10-CM

## 2020-09-01 DIAGNOSIS — M25.561 ACUTE PAIN OF RIGHT KNEE: ICD-10-CM

## 2020-09-01 DIAGNOSIS — S83.249A MEDIAL MENISCUS TEAR: ICD-10-CM

## 2020-09-01 DIAGNOSIS — M25.561 ACUTE PAIN OF RIGHT KNEE: Primary | ICD-10-CM

## 2020-09-01 PROCEDURE — 73562 X-RAY EXAM OF KNEE 3: CPT | Mod: TC,PN,RT

## 2020-09-01 PROCEDURE — 73562 XR KNEE 3 VIEW RIGHT: ICD-10-PCS | Mod: 26,RT,, | Performed by: RADIOLOGY

## 2020-09-01 PROCEDURE — 73562 X-RAY EXAM OF KNEE 3: CPT | Mod: 26,RT,, | Performed by: RADIOLOGY

## 2020-09-01 PROCEDURE — 99999 PR PBB SHADOW E&M-EST. PATIENT-LVL III: CPT | Mod: PBBFAC,,, | Performed by: ORTHOPAEDIC SURGERY

## 2020-09-01 PROCEDURE — 99999 PR PBB SHADOW E&M-EST. PATIENT-LVL III: ICD-10-PCS | Mod: PBBFAC,,, | Performed by: NURSE PRACTITIONER

## 2020-09-01 PROCEDURE — 99213 OFFICE O/P EST LOW 20 MIN: CPT | Mod: 57,S$PBB,, | Performed by: ORTHOPAEDIC SURGERY

## 2020-09-01 PROCEDURE — 99999 PR PBB SHADOW E&M-EST. PATIENT-LVL III: ICD-10-PCS | Mod: PBBFAC,,, | Performed by: ORTHOPAEDIC SURGERY

## 2020-09-01 PROCEDURE — 99213 PR OFFICE/OUTPT VISIT, EST, LEVL III, 20-29 MIN: ICD-10-PCS | Mod: 57,S$PBB,, | Performed by: ORTHOPAEDIC SURGERY

## 2020-09-01 PROCEDURE — 99213 OFFICE O/P EST LOW 20 MIN: CPT | Mod: S$PBB,,, | Performed by: NURSE PRACTITIONER

## 2020-09-01 PROCEDURE — 99213 PR OFFICE/OUTPT VISIT, EST, LEVL III, 20-29 MIN: ICD-10-PCS | Mod: S$PBB,,, | Performed by: NURSE PRACTITIONER

## 2020-09-01 PROCEDURE — 99999 PR PBB SHADOW E&M-EST. PATIENT-LVL III: CPT | Mod: PBBFAC,,, | Performed by: NURSE PRACTITIONER

## 2020-09-01 RX ORDER — MUPIROCIN 20 MG/G
OINTMENT TOPICAL
Status: CANCELLED | OUTPATIENT
Start: 2020-09-01

## 2020-09-01 RX ORDER — SODIUM CHLORIDE 9 MG/ML
INJECTION, SOLUTION INTRAVENOUS CONTINUOUS
Status: CANCELLED | OUTPATIENT
Start: 2020-09-01

## 2020-09-01 RX ORDER — PHENTERMINE HYDROCHLORIDE 37.5 MG/1
37.5 TABLET ORAL
Qty: 30 TABLET | Refills: 0 | Status: SHIPPED | OUTPATIENT
Start: 2020-09-01 | End: 2020-10-01

## 2020-09-01 NOTE — PROGRESS NOTES
Chief Complaint: Pain of the Left Knee     Referring Provider: Jaimee Bennett, Np  [address] [place] [address]     HPI:  Ms. Narayanan is a 45-year-old lady who returnsedtoday with complaints of recurrent pain in both of her knees.  Her right knee is worse than her left.  At her last visit on 2020 she was given a steroid injection which gave her approximately 2 weeks of relief and then her pain recurred.  She was originally seen on  for 8 years of bilateral knee pain.  Walking stairs increases her symptoms while elevation and rest seems to improve them.  She did get swelling but denied giving way or locking.  She has taken NSAIDs with help.  She has worn a brace and had an injection without resolution of her pain.  She did home exercise/physical therapy which did not resolve her symptoms..          Past Medical History:   Diagnosis Date    Morbid obesity with BMI of 45.0-49.9, adult              Past Surgical History:   Procedure Laterality Date     SECTION             Review of patient's allergies indicates:  No Known Allergies     Social History            Occupational History    Manager   Tobacco Use    Smoking status: Never Smoker    Smokeless tobacco: Never Used   Substance and Sexual Activity    Alcohol use: No       Frequency: Never    Drug use: No    Sexual activity: Never            Family History   Problem Relation Age of Onset    Hypertension Mother           Previous Hospitalizations:  Childbirth.       ROS:  No new diagnosis/surgery/prescriptions since last office visit on 2020.  Constitution: Negative for chills and fever.   HENT: Negative for congestion.    Eyes: Negative for blurred vision.   Cardiovascular: Negative for chest pain.   Respiratory: Negative for cough.    Endocrine: Negative for polydipsia.   Hematologic/Lymphatic: Negative for adenopathy.   Skin: Negative for flushing and itching.   Musculoskeletal: Positive for joint pain  and joint swelling. Negative for gout.   Gastrointestinal: Negative for constipation, diarrhea and heartburn.   Genitourinary: Negative for nocturia.   Neurological: Negative for headaches and seizures.   Psychiatric/Behavioral: Negative for depression and substance abuse. The patient is not nervous/anxious.    Allergic/Immunologic: Negative for environmental allergies.             Objective:   Physical Exam:   General: AAOx3.  No acute distress  HEENT: Normocephalic, PEARLA EOMI, Good Dentition  Neck: Supple, No JVD  Chest: Symetric, equal excursion on inspiration  Abdomen: Soft NTND  Vascular:  Pulses intact and equal bilaterally.  Capillary refill less than 3 seconds and equal bilaterally  Neurologic:  Pinprick and soft touch intact and equal bilaterally  Integment:  No ecchymosis, no errythema  Extremity:  Knee:  Extension/flexion equal bilaterally 0/128 degrees.  Mild effusion both knees.  Mild crepitus with motion both knees.  Baker cyst, both knees.  Mildly positive patellar load/compression both knees.  Negative patella apprehension/relocation both knees.  Mild J-sign both knees.  Varus/valgus stressing equal bilaterally with endpoint.  Lachman's/drawer equal bilaterally with endpoint.  Positive medial joint line tenderness both knees.  Shasha mildly positive both knees.  [place] positive both knees.  Nontender at the anserine insertion bilaterally.  No swelling at the anserine insertion bilaterally.  Radiography:  Personally reviewed x-rays of the left knee completed on 08/07/2020 showed tricompartmental arthritic changes with osteophytes off the medial femoral condyle and tibial plateau along with osteophytes off the patella.      Assessment:       Impression:       1. Bilateral derangement of medial meniscus    2. Primary osteoarthritis of both knees    3. Synovial cyst of left popliteal space    4. Synovial cyst of popliteal space (Serrato), right knee           Plan:       1.  Discussed physical  examination and radiographic findings with the patient. Nereida understands that she has degenerative tears of the medial meniscus of both of her knees along with arthritis in her knees.  Treatment alternatives and outcomes were discussed with the patient.  She could continue with conservative management such as NSAIDs, bracing, physical therapy, injections, or she could consider surgical intervention such as arthroscopy versus total knee arthroplasty.  If she were to consider surgery she should trial arthroscopy 1st to see if she gets improvement because she is young.  She stated that she has failed conservative management and would prefer to proceed with surgery and would like to schedule arthroscopy on her right knee 1st as it is the most symptomatic.  2.  Possible complications of surgery to include bleeding, infection, scarring, nerve/blood vessel/tendon damage, need for further surgery, failed surgery, failure to improve, possible persistent pain, possible arthritis, possible arthrofibrosis, possible DVT, possible PE, possible demise, and possible recurrence were discussed with the patient.  The patient was permitted to ask questions and all concerns were addressed to her satisfaction.  3.  Consent for arthroscopy of the left knee.  4.  Tentatively schedule surgery for 09/09/2020.  5.  Voltaren 1% gel, apply to affected area twice daily and massage in for 2 min, dispense 100 g, refill 5.  6.  The patient understands she should start taking an 81 mg aspirin twice a day speed beginning approximately 2 days before surgery.  7.  Continue with home exercises previously shown discussed.  8.  Offered referred to physical/occupational therapy the patient declined stating that she may need it for postop.  9.  Continue with brace wear.  10.  Continue with NSAIDs as tolerated allowed by PCM.  11.  Follow up approximately 10-12 days postop.

## 2020-09-01 NOTE — H&P
Chief Complaint: Pain of the Left Knee      HPI:  Ms. Narayanan is a 45-year-old lady who returnsedtoday with complaints of recurrent pain in both of her knees.  Her right knee is worse than her left.  At her last visit on 2020 she was given a steroid injection which gave her approximately 2 weeks of relief and then her pain recurred.  She was originally seen on  for 8 years of bilateral knee pain.  Walking stairs increases her symptoms while elevation and rest seems to improve them.  She did get swelling but denied giving way or locking.  She has taken NSAIDs with help.  She has worn a brace and had an injection without resolution of her pain.  She did home exercise/physical therapy which did not resolve her symptoms..          Past Medical History:   Diagnosis Date    Morbid obesity with BMI of 45.0-49.9, adult              Past Surgical History:   Procedure Laterality Date     SECTION             Review of patient's allergies indicates:  No Known Allergies     Social History            Occupational History    Manager   Tobacco Use    Smoking status: Never Smoker    Smokeless tobacco: Never Used   Substance and Sexual Activity    Alcohol use: No       Frequency: Never    Drug use: No    Sexual activity: Never            Family History   Problem Relation Age of Onset    Hypertension Mother           Previous Hospitalizations:  Childbirth.       ROS:  No new diagnosis/surgery/prescriptions since last office visit on 2020.  Constitution: Negative for chills and fever.   HENT: Negative for congestion.    Eyes: Negative for blurred vision.   Cardiovascular: Negative for chest pain.   Respiratory: Negative for cough.    Endocrine: Negative for polydipsia.   Hematologic/Lymphatic: Negative for adenopathy.   Skin: Negative for flushing and itching.   Musculoskeletal: Positive for joint pain and joint swelling. Negative for gout.   Gastrointestinal: Negative for constipation, diarrhea  and heartburn.   Genitourinary: Negative for nocturia.   Neurological: Negative for headaches and seizures.   Psychiatric/Behavioral: Negative for depression and substance abuse. The patient is not nervous/anxious.    Allergic/Immunologic: Negative for environmental allergies.             Objective:   Physical Exam:   General: AAOx3.  No acute distress  HEENT: Normocephalic, PEARLA EOMI, Good Dentition  Neck: Supple, No JVD  Chest: Symetric, equal excursion on inspiration  Abdomen: Soft NTND  Vascular:  Pulses intact and equal bilaterally.  Capillary refill less than 3 seconds and equal bilaterally  Neurologic:  Pinprick and soft touch intact and equal bilaterally  Integment:  No ecchymosis, no errythema  Extremity:  Knee:  Extension/flexion equal bilaterally 0/128 degrees.  Mild effusion both knees.  Mild crepitus with motion both knees.  Baker cyst, both knees.  Mildly positive patellar load/compression both knees.  Negative patella apprehension/relocation both knees.  Mild J-sign both knees.  Varus/valgus stressing equal bilaterally with endpoint.  Lachman's/drawer equal bilaterally with endpoint.  Positive medial joint line tenderness both knees.  Shasha mildly positive both knees.  [place] positive both knees.  Nontender at the anserine insertion bilaterally.  No swelling at the anserine insertion bilaterally.  Radiography:  Personally reviewed x-rays of the left knee completed on 08/07/2020 showed tricompartmental arthritic changes with osteophytes off the medial femoral condyle and tibial plateau along with osteophytes off the patella.      Assessment:       Impression:       1. Bilateral derangement of medial meniscus    2. Primary osteoarthritis of both knees    3. Synovial cyst of left popliteal space    4. Synovial cyst of popliteal space (Serrato), right knee           Plan:       1.  Discussed physical examination and radiographic findings with the patient. Nereida understands that she has  degenerative tears of the medial meniscus of both of her knees along with arthritis in her knees.  Treatment alternatives and outcomes were discussed with the patient.  She could continue with conservative management such as NSAIDs, bracing, physical therapy, injections, or she could consider surgical intervention such as arthroscopy versus total knee arthroplasty.  If she were to consider surgery she should trial arthroscopy 1st to see if she gets improvement because she is young.  She stated that she has failed conservative management and would prefer to proceed with surgery and would like to schedule arthroscopy on her right knee 1st as it is the most symptomatic.  2.  Possible complications of surgery to include bleeding, infection, scarring, nerve/blood vessel/tendon damage, need for further surgery, failed surgery, failure to improve, possible persistent pain, possible arthritis, possible arthrofibrosis, possible DVT, possible PE, possible demise, and possible recurrence were discussed with the patient.  The patient was permitted to ask questions and all concerns were addressed to her satisfaction.  3.  Consent for arthroscopy of the left knee.  4.  Tentatively schedule surgery for 09/09/2020.  5.  Voltaren 1% gel, apply to affected area twice daily and massage in for 2 min, dispense 100 g, refill 5.  6.  The patient understands she should start taking an 81 mg aspirin twice a day speed beginning approximately 2 days before surgery.  7.  Continue with home exercises previously shown discussed.  8.  Offered referred to physical/occupational therapy the patient declined stating that she may need it for postop.  9.  Continue with brace wear.  10.  Continue with NSAIDs as tolerated allowed by PCM.  11.  Follow up approximately 10-12 days postop.

## 2020-09-01 NOTE — PROGRESS NOTES
"Subjective:       Patient ID: Nereida Narayanan is a 45 y.o. female.    Chief Complaint: Follow-up    Ms. Nereida Narayanan is a 45 year old female who presents to the clinic today for f/u. She is doing well. Scheduled for upcoming knee surgery. Active and exercising.  reviewed together. Patient feels that medication is effective. She reports decrease in appetite. Weight 243 lbs now 229 lbs. Denies any cp, palpitations, sob    Follow-up  Associated symptoms include arthralgias. Pertinent negatives include no abdominal pain, chest pain, coughing, fatigue, fever, headaches, myalgias, nausea, vomiting or weakness.     Review of Systems   Constitutional: Negative for activity change, appetite change, fatigue and fever.   Respiratory: Negative for cough, chest tightness, shortness of breath and wheezing.    Cardiovascular: Positive for leg swelling. Negative for chest pain and palpitations.   Gastrointestinal: Negative for abdominal pain, diarrhea, nausea and vomiting.   Musculoskeletal: Positive for arthralgias. Negative for myalgias.   Skin: Negative for color change.   Neurological: Negative for dizziness, tremors, syncope, weakness and headaches.   Psychiatric/Behavioral: Negative for dysphoric mood and sleep disturbance.         Reviewed family, medical, surgical, and social history.    Objective:      /79 (BP Location: Left arm, Patient Position: Sitting, BP Method: Large (Automatic))   Pulse 89   Temp 98.2 °F (36.8 °C) (Tympanic)   Resp 20   Ht 4' 11" (1.499 m)   Wt 104 kg (229 lb 3.2 oz)   LMP 08/19/2020   SpO2 98%   BMI 46.29 kg/m²   Physical Exam  Vitals signs reviewed.   Constitutional:       General: She is not in acute distress.     Appearance: She is well-developed. She is not diaphoretic.      Comments: obese   HENT:      Head: Normocephalic.      Right Ear: Tympanic membrane normal.      Left Ear: Tympanic membrane normal.      Nose: Nose normal.      Mouth/Throat:      Mouth: Mucous " membranes are moist.      Pharynx: Oropharynx is clear.   Eyes:      Conjunctiva/sclera: Conjunctivae normal.      Pupils: Pupils are equal, round, and reactive to light.   Neck:      Musculoskeletal: Normal range of motion.   Cardiovascular:      Rate and Rhythm: Normal rate and regular rhythm.      Pulses: Normal pulses.      Heart sounds: Normal heart sounds. No murmur.   Pulmonary:      Effort: Pulmonary effort is normal. No respiratory distress.      Breath sounds: Normal breath sounds. No stridor.   Abdominal:      General: Bowel sounds are normal.      Tenderness: There is no abdominal tenderness.   Musculoskeletal: Normal range of motion.   Skin:     General: Skin is warm.      Capillary Refill: Capillary refill takes less than 2 seconds.   Neurological:      General: No focal deficit present.      Mental Status: She is alert and oriented to person, place, and time.   Psychiatric:         Mood and Affect: Mood normal.         Behavior: Behavior normal.         Thought Content: Thought content normal.         Judgment: Judgment normal.         Assessment:       1. Bilateral lower extremity edema    2. Weight loss counseling, encounter for        Plan:       Bilateral lower extremity edema  -     phentermine (ADIPEX-P) 37.5 mg tablet; Take 1 tablet (37.5 mg total) by mouth before breakfast.  Dispense: 30 tablet; Refill: 0    Weight loss counseling, encounter for  -     phentermine (ADIPEX-P) 37.5 mg tablet; Take 1 tablet (37.5 mg total) by mouth before breakfast.  Dispense: 30 tablet; Refill: 0        PLAN:  - Discussed with patient the plan of care  -  reviewed  - Medications reviewed. Medication side effects discussed. Patient has no questions or concerns at this time. Informed patient to notify me regarding any concerns.   - Informed patient to please notify me with any questions or concerns at anytime  - Follow up ordered for 4 weeks        Risks, benefits, and side effects were discussed with the  patient. All questions were answered to the fullest satisfaction of the patient, and pt verbalized understanding and agreement to treatment plan. Pt was to call with any new or worsening symptoms, or present to the ER.

## 2020-09-01 NOTE — H&P (VIEW-ONLY)
Chief Complaint: Pain of the Left Knee      HPI:  Ms. Narayanan is a 45-year-old lady who returnsedtoday with complaints of recurrent pain in both of her knees.  Her right knee is worse than her left.  At her last visit on 2020 she was given a steroid injection which gave her approximately 2 weeks of relief and then her pain recurred.  She was originally seen on  for 8 years of bilateral knee pain.  Walking stairs increases her symptoms while elevation and rest seems to improve them.  She did get swelling but denied giving way or locking.  She has taken NSAIDs with help.  She has worn a brace and had an injection without resolution of her pain.  She did home exercise/physical therapy which did not resolve her symptoms..          Past Medical History:   Diagnosis Date    Morbid obesity with BMI of 45.0-49.9, adult              Past Surgical History:   Procedure Laterality Date     SECTION             Review of patient's allergies indicates:  No Known Allergies     Social History            Occupational History    Manager   Tobacco Use    Smoking status: Never Smoker    Smokeless tobacco: Never Used   Substance and Sexual Activity    Alcohol use: No       Frequency: Never    Drug use: No    Sexual activity: Never            Family History   Problem Relation Age of Onset    Hypertension Mother           Previous Hospitalizations:  Childbirth.       ROS:  No new diagnosis/surgery/prescriptions since last office visit on 2020.  Constitution: Negative for chills and fever.   HENT: Negative for congestion.    Eyes: Negative for blurred vision.   Cardiovascular: Negative for chest pain.   Respiratory: Negative for cough.    Endocrine: Negative for polydipsia.   Hematologic/Lymphatic: Negative for adenopathy.   Skin: Negative for flushing and itching.   Musculoskeletal: Positive for joint pain and joint swelling. Negative for gout.   Gastrointestinal: Negative for constipation, diarrhea  and heartburn.   Genitourinary: Negative for nocturia.   Neurological: Negative for headaches and seizures.   Psychiatric/Behavioral: Negative for depression and substance abuse. The patient is not nervous/anxious.    Allergic/Immunologic: Negative for environmental allergies.             Objective:   Physical Exam:   General: AAOx3.  No acute distress  HEENT: Normocephalic, PEARLA EOMI, Good Dentition  Neck: Supple, No JVD  Chest: Symetric, equal excursion on inspiration  Abdomen: Soft NTND  Vascular:  Pulses intact and equal bilaterally.  Capillary refill less than 3 seconds and equal bilaterally  Neurologic:  Pinprick and soft touch intact and equal bilaterally  Integment:  No ecchymosis, no errythema  Extremity:  Knee:  Extension/flexion equal bilaterally 0/128 degrees.  Mild effusion both knees.  Mild crepitus with motion both knees.  Baker cyst, both knees.  Mildly positive patellar load/compression both knees.  Negative patella apprehension/relocation both knees.  Mild J-sign both knees.  Varus/valgus stressing equal bilaterally with endpoint.  Lachman's/drawer equal bilaterally with endpoint.  Positive medial joint line tenderness both knees.  Shasha mildly positive both knees.  [place] positive both knees.  Nontender at the anserine insertion bilaterally.  No swelling at the anserine insertion bilaterally.  Radiography:  Personally reviewed x-rays of the left knee completed on 08/07/2020 showed tricompartmental arthritic changes with osteophytes off the medial femoral condyle and tibial plateau along with osteophytes off the patella.      Assessment:       Impression:       1. Bilateral derangement of medial meniscus    2. Primary osteoarthritis of both knees    3. Synovial cyst of left popliteal space    4. Synovial cyst of popliteal space (Serrato), right knee           Plan:       1.  Discussed physical examination and radiographic findings with the patient. Nereida understands that she has  degenerative tears of the medial meniscus of both of her knees along with arthritis in her knees.  Treatment alternatives and outcomes were discussed with the patient.  She could continue with conservative management such as NSAIDs, bracing, physical therapy, injections, or she could consider surgical intervention such as arthroscopy versus total knee arthroplasty.  If she were to consider surgery she should trial arthroscopy 1st to see if she gets improvement because she is young.  She stated that she has failed conservative management and would prefer to proceed with surgery and would like to schedule arthroscopy on her right knee 1st as it is the most symptomatic.  2.  Possible complications of surgery to include bleeding, infection, scarring, nerve/blood vessel/tendon damage, need for further surgery, failed surgery, failure to improve, possible persistent pain, possible arthritis, possible arthrofibrosis, possible DVT, possible PE, possible demise, and possible recurrence were discussed with the patient.  The patient was permitted to ask questions and all concerns were addressed to her satisfaction.  3.  Consent for arthroscopy of the left knee.  4.  Tentatively schedule surgery for 09/09/2020.  5.  Voltaren 1% gel, apply to affected area twice daily and massage in for 2 min, dispense 100 g, refill 5.  6.  The patient understands she should start taking an 81 mg aspirin twice a day speed beginning approximately 2 days before surgery.  7.  Continue with home exercises previously shown discussed.  8.  Offered referred to physical/occupational therapy the patient declined stating that she may need it for postop.  9.  Continue with brace wear.  10.  Continue with NSAIDs as tolerated allowed by PCM.  11.  Follow up approximately 10-12 days postop.

## 2020-09-03 ENCOUNTER — ANESTHESIA EVENT (OUTPATIENT)
Dept: SURGERY | Facility: HOSPITAL | Age: 45
End: 2020-09-03

## 2020-09-03 ENCOUNTER — HOSPITAL ENCOUNTER (OUTPATIENT)
Dept: PREADMISSION TESTING | Facility: HOSPITAL | Age: 45
Discharge: HOME | End: 2020-09-03
Attending: ORTHOPAEDIC SURGERY
Payer: COMMERCIAL

## 2020-09-03 VITALS — HEIGHT: 59 IN | WEIGHT: 229 LBS | BODY MASS INDEX: 46.16 KG/M2

## 2020-09-03 PROCEDURE — 99900103 DSU ONLY-NO CHARGE-INITIAL HR (STAT)

## 2020-09-03 NOTE — PRE-PROCEDURE INSTRUCTIONS
1330- ARRIVED FOR PAT. ALERT AND ORIENTED. AMBULATORY. SURGERY  ARTHROSCOPY KNEE ON WED, 09, 2020. DISCUSSED PRE, POST OP AND DISCHARGE. ONE FAMILY MEMBER. NPO AFTER MIDNIGHT. MEDS REVIEWED. BARTOLO NEEDED. HIBICLENS WASH GIVEN AND INSTRUCTED ON HOW TO USE. DR RUSSELL TO SEE PT. CONSENT. SIGNED. DR RUSSELL STATED TO HOLD OFF ON UCG AND GET MORNING OF. PT NOT ABLE TO COME ON WEEKEND TO DO COVID TEST DO TO WORKING. WILL NEED TO DO A RAPID MORNING OF.   1402- PAT COMPLETE TO LAB.

## 2020-09-03 NOTE — ANESTHESIA PREPROCEDURE EVALUATION
09/03/2020  Nereida Narayanan is a 45 y.o., female.    Anesthesia Evaluation    I have reviewed the Patient Summary Reports.    I have reviewed the Nursing Notes.    I have reviewed the Medications.     Review of Systems  Anesthesia Hx:  No problems with previous Anesthesia  Neg history of prior surgery. Denies Family Hx of Anesthesia complications.   Denies Personal Hx of Anesthesia complications.   Social:  Non-Smoker    Hematology/Oncology:  Hematology Normal   Oncology Normal     EENT/Dental:EENT/Dental Normal   Cardiovascular:  Cardiovascular Normal     Pulmonary:  Pulmonary Normal    Renal/:  Renal/ Normal     Hepatic/GI:  Hepatic/GI Normal    Musculoskeletal:  Musculoskeletal Normal    Neurological:  Neurology Normal    Endocrine:  Endocrine Normal    Dermatological:  Skin Normal    Psych:  Psychiatric Normal           Physical Exam  General:  Obesity    Airway/Jaw/Neck:  Airway Findings: Mouth Opening: Small, but > 3cm Tongue: Normal  General Airway Assessment: Adult  Mallampati: III  TM Distance: 4 - 6 cm        Eyes/Ears/Nose:  EYES/EARS/NOSE FINDINGS: Normal   Dental:  DENTAL FINDINGS: Normal   Chest/Lungs:  Chest/Lungs Findings: Clear to auscultation     Heart/Vascular:  Heart Findings: Rate: Normal  Rhythm: Regular Rhythm  Heart murmur: negative Vascular Findings: Normal    Abdomen:  Abdomen Findings: Normal    Musculoskeletal:  Musculoskeletal Findings: Normal   Skin:  Skin Findings: Normal    Mental Status:  Mental Status Findings: Normal        Anesthesia Plan  Type of Anesthesia, risks & benefits discussed:  Anesthesia Type:  general  Patient's Preference:   Intra-op Monitoring Plan: standard ASA monitors  Intra-op Monitoring Plan Comments:   Post Op Pain Control Plan:   Post Op Pain Control Plan Comments:   Induction:   IV  Beta Blocker:  Patient is not currently on a Beta-Blocker (No  further documentation required).       Informed Consent: Patient understands risks and agrees with Anesthesia plan.  Questions answered. Anesthesia consent signed with patient.  ASA Score: 3     Day of Surgery Review of History & Physical: I have interviewed and examined the patient. I have reviewed the patient's H&P dated:    H&P update referred to the provider.         Ready For Surgery From Anesthesia Perspective.

## 2020-09-04 DIAGNOSIS — S83.241A TEAR OF MEDIAL MENISCUS OF RIGHT KNEE, CURRENT, UNSPECIFIED TEAR TYPE, INITIAL ENCOUNTER: Primary | ICD-10-CM

## 2020-09-08 ENCOUNTER — TELEPHONE (OUTPATIENT)
Dept: ORTHOPEDICS | Facility: CLINIC | Age: 45
End: 2020-09-08

## 2020-09-08 NOTE — TELEPHONE ENCOUNTER
Returned call to pt. Informed pt pre service stated her insurance does not pay for surgery and it will be self pay. Advised pt to contact billing then let me know if she will be proceeding with surgery. Patient verbalized understanding.

## 2020-09-08 NOTE — TELEPHONE ENCOUNTER
Called pt to inform her that pre service reached out and stated that her insurance does pay for her surgery and it will be self pay. Pt did not answer. NARGIS

## 2020-09-08 NOTE — TELEPHONE ENCOUNTER
----- Message from Senait Morel sent at 9/8/2020 10:58 AM CDT -----  Regarding: return call  Contact: Patient/[phone] (home)  Type:  Patient Returning Call    Who Called:  Patient/[phone] (home)     Who Left Message for Patient:  n/a  Does the patient know what this is regarding?:  no

## 2020-09-08 NOTE — TELEPHONE ENCOUNTER
----- Message from Nereida Zohra sent at 9/8/2020 11:24 AM CDT -----  Regarding: questions about procedure  Contact: pt  Type: Needs Medical Advice    Who Called:      Best Call Back Number:     Additional Information: Requesting a call back from Nurse, regarding questions about procedure and insurance and pt is okay to have it done with [facility] ,please call back

## 2020-09-09 ENCOUNTER — ANESTHESIA (OUTPATIENT)
Dept: SURGERY | Facility: HOSPITAL | Age: 45
End: 2020-09-09

## 2020-09-09 ENCOUNTER — HOSPITAL ENCOUNTER (OUTPATIENT)
Facility: HOSPITAL | Age: 45
Discharge: HOME | End: 2020-09-09
Attending: ORTHOPAEDIC SURGERY | Admitting: ORTHOPAEDIC SURGERY

## 2020-09-09 VITALS
DIASTOLIC BLOOD PRESSURE: 88 MMHG | OXYGEN SATURATION: 100 % | HEART RATE: 70 BPM | WEIGHT: 229 LBS | TEMPERATURE: 98 F | SYSTOLIC BLOOD PRESSURE: 148 MMHG | RESPIRATION RATE: 12 BRPM | HEIGHT: 59 IN | BODY MASS INDEX: 46.16 KG/M2

## 2020-09-09 DIAGNOSIS — S83.249A MEDIAL MENISCUS TEAR: Primary | ICD-10-CM

## 2020-09-09 DIAGNOSIS — S83.241A TEAR OF MEDIAL MENISCUS OF RIGHT KNEE, CURRENT, UNSPECIFIED TEAR TYPE, INITIAL ENCOUNTER: ICD-10-CM

## 2020-09-09 DIAGNOSIS — Z01.818 PRE-OP TESTING: ICD-10-CM

## 2020-09-09 LAB
B-HCG UR QL: NEGATIVE
SARS-COV-2 RDRP RESP QL NAA+PROBE: NEGATIVE

## 2020-09-09 PROCEDURE — U0002 COVID-19 LAB TEST NON-CDC: HCPCS

## 2020-09-09 PROCEDURE — 63600175 PHARM REV CODE 636 W HCPCS: Performed by: ORTHOPAEDIC SURGERY

## 2020-09-09 PROCEDURE — 71000033 HC RECOVERY, INTIAL HOUR: Performed by: ORTHOPAEDIC SURGERY

## 2020-09-09 PROCEDURE — 36000711: Performed by: ORTHOPAEDIC SURGERY

## 2020-09-09 PROCEDURE — 25000003 PHARM REV CODE 250

## 2020-09-09 PROCEDURE — S0028 INJECTION, FAMOTIDINE, 20 MG: HCPCS

## 2020-09-09 PROCEDURE — 29880 ARTHRS KNE SRG MNISECTMY M&L: CPT | Mod: RT,,, | Performed by: ORTHOPAEDIC SURGERY

## 2020-09-09 PROCEDURE — 29880 PR KNEE SCOPE MED/LAT MENISCECTOMY: ICD-10-PCS | Mod: RT,,, | Performed by: ORTHOPAEDIC SURGERY

## 2020-09-09 PROCEDURE — 81025 URINE PREGNANCY TEST: CPT

## 2020-09-09 PROCEDURE — 36000710: Performed by: ORTHOPAEDIC SURGERY

## 2020-09-09 PROCEDURE — 25000003 PHARM REV CODE 250: Performed by: ORTHOPAEDIC SURGERY

## 2020-09-09 PROCEDURE — 37000008 HC ANESTHESIA 1ST 15 MINUTES: Performed by: ORTHOPAEDIC SURGERY

## 2020-09-09 PROCEDURE — D9220A PRA ANESTHESIA: Mod: ,,, | Performed by: ANESTHESIOLOGY

## 2020-09-09 PROCEDURE — 27201423 OPTIME MED/SURG SUP & DEVICES STERILE SUPPLY: Performed by: ORTHOPAEDIC SURGERY

## 2020-09-09 PROCEDURE — 37000009 HC ANESTHESIA EA ADD 15 MINS: Performed by: ORTHOPAEDIC SURGERY

## 2020-09-09 PROCEDURE — 63600175 PHARM REV CODE 636 W HCPCS: Performed by: ANESTHESIOLOGY

## 2020-09-09 PROCEDURE — 63600175 PHARM REV CODE 636 W HCPCS: Performed by: NURSE ANESTHETIST, CERTIFIED REGISTERED

## 2020-09-09 PROCEDURE — D9220A PRA ANESTHESIA: ICD-10-PCS | Mod: ,,, | Performed by: ANESTHESIOLOGY

## 2020-09-09 PROCEDURE — 71000015 HC POSTOP RECOV 1ST HR: Performed by: ORTHOPAEDIC SURGERY

## 2020-09-09 RX ORDER — MUPIROCIN 20 MG/G
OINTMENT TOPICAL
Status: DISCONTINUED | OUTPATIENT
Start: 2020-09-09 | End: 2020-09-09 | Stop reason: HOSPADM

## 2020-09-09 RX ORDER — HYDROCODONE BITARTRATE AND ACETAMINOPHEN 7.5; 325 MG/1; MG/1
TABLET ORAL
Status: COMPLETED
Start: 2020-09-09 | End: 2020-09-09

## 2020-09-09 RX ORDER — BUPIVACAINE HYDROCHLORIDE AND EPINEPHRINE 2.5; 5 MG/ML; UG/ML
INJECTION, SOLUTION EPIDURAL; INFILTRATION; INTRACAUDAL; PERINEURAL
Status: DISCONTINUED | OUTPATIENT
Start: 2020-09-09 | End: 2020-09-09 | Stop reason: HOSPADM

## 2020-09-09 RX ORDER — MEPERIDINE HYDROCHLORIDE 50 MG/ML
INJECTION INTRAMUSCULAR; INTRAVENOUS; SUBCUTANEOUS
Status: DISCONTINUED | OUTPATIENT
Start: 2020-09-09 | End: 2020-09-09

## 2020-09-09 RX ORDER — SODIUM CHLORIDE, SODIUM LACTATE, POTASSIUM CHLORIDE, CALCIUM CHLORIDE 600; 310; 30; 20 MG/100ML; MG/100ML; MG/100ML; MG/100ML
INJECTION, SOLUTION INTRAVENOUS CONTINUOUS
Status: DISCONTINUED | OUTPATIENT
Start: 2020-09-09 | End: 2020-09-09 | Stop reason: HOSPADM

## 2020-09-09 RX ORDER — ONDANSETRON 2 MG/ML
INJECTION INTRAMUSCULAR; INTRAVENOUS
Status: DISCONTINUED | OUTPATIENT
Start: 2020-09-09 | End: 2020-09-09

## 2020-09-09 RX ORDER — CEFAZOLIN SODIUM 2 G/50ML
2 SOLUTION INTRAVENOUS
Status: COMPLETED | OUTPATIENT
Start: 2020-09-09 | End: 2020-09-09

## 2020-09-09 RX ORDER — LIDOCAINE HYDROCHLORIDE AND EPINEPHRINE 10; 10 MG/ML; UG/ML
INJECTION, SOLUTION INFILTRATION; PERINEURAL
Status: DISCONTINUED | OUTPATIENT
Start: 2020-09-09 | End: 2020-09-09 | Stop reason: HOSPADM

## 2020-09-09 RX ORDER — HYDROCODONE BITARTRATE AND ACETAMINOPHEN 7.5; 325 MG/1; MG/1
1 TABLET ORAL ONCE AS NEEDED
Status: COMPLETED | OUTPATIENT
Start: 2020-09-09 | End: 2020-09-09

## 2020-09-09 RX ORDER — DIPHENHYDRAMINE HYDROCHLORIDE 50 MG/ML
12.5 INJECTION INTRAMUSCULAR; INTRAVENOUS
Status: DISCONTINUED | OUTPATIENT
Start: 2020-09-09 | End: 2020-09-09 | Stop reason: HOSPADM

## 2020-09-09 RX ORDER — SODIUM CHLORIDE, SODIUM LACTATE, POTASSIUM CHLORIDE, CALCIUM CHLORIDE 600; 310; 30; 20 MG/100ML; MG/100ML; MG/100ML; MG/100ML
125 INJECTION, SOLUTION INTRAVENOUS CONTINUOUS
Status: DISCONTINUED | OUTPATIENT
Start: 2020-09-09 | End: 2020-09-09 | Stop reason: HOSPADM

## 2020-09-09 RX ORDER — FAMOTIDINE 10 MG/ML
INJECTION INTRAVENOUS
Status: COMPLETED
Start: 2020-09-09 | End: 2020-09-09

## 2020-09-09 RX ORDER — PROPOFOL 10 MG/ML
VIAL (ML) INTRAVENOUS
Status: DISCONTINUED | OUTPATIENT
Start: 2020-09-09 | End: 2020-09-09

## 2020-09-09 RX ORDER — MIDAZOLAM HYDROCHLORIDE 1 MG/ML
INJECTION, SOLUTION INTRAMUSCULAR; INTRAVENOUS
Status: DISCONTINUED | OUTPATIENT
Start: 2020-09-09 | End: 2020-09-09

## 2020-09-09 RX ORDER — LIDOCAINE HYDROCHLORIDE 10 MG/ML
1 INJECTION, SOLUTION EPIDURAL; INFILTRATION; INTRACAUDAL; PERINEURAL ONCE
Status: DISCONTINUED | OUTPATIENT
Start: 2020-09-09 | End: 2020-09-09 | Stop reason: HOSPADM

## 2020-09-09 RX ORDER — FAMOTIDINE 10 MG/ML
20 INJECTION INTRAVENOUS ONCE
Status: COMPLETED | OUTPATIENT
Start: 2020-09-09 | End: 2020-09-09

## 2020-09-09 RX ORDER — ONDANSETRON 2 MG/ML
4 INJECTION INTRAMUSCULAR; INTRAVENOUS DAILY PRN
Status: DISCONTINUED | OUTPATIENT
Start: 2020-09-09 | End: 2020-09-09 | Stop reason: HOSPADM

## 2020-09-09 RX ORDER — MORPHINE SULFATE 4 MG/ML
2 INJECTION, SOLUTION INTRAMUSCULAR; INTRAVENOUS EVERY 5 MIN PRN
Status: DISCONTINUED | OUTPATIENT
Start: 2020-09-09 | End: 2020-09-09 | Stop reason: HOSPADM

## 2020-09-09 RX ORDER — SODIUM CHLORIDE 9 MG/ML
INJECTION, SOLUTION INTRAVENOUS CONTINUOUS
Status: DISCONTINUED | OUTPATIENT
Start: 2020-09-09 | End: 2020-09-09 | Stop reason: HOSPADM

## 2020-09-09 RX ADMIN — SODIUM CHLORIDE, POTASSIUM CHLORIDE, SODIUM LACTATE AND CALCIUM CHLORIDE: 600; 310; 30; 20 INJECTION, SOLUTION INTRAVENOUS at 08:09

## 2020-09-09 RX ADMIN — FAMOTIDINE 20 MG: 10 INJECTION INTRAVENOUS at 07:09

## 2020-09-09 RX ADMIN — Medication 50 MG: at 07:09

## 2020-09-09 RX ADMIN — MIDAZOLAM HYDROCHLORIDE 2 MG: 1 INJECTION, SOLUTION INTRAMUSCULAR; INTRAVENOUS at 07:09

## 2020-09-09 RX ADMIN — MUPIROCIN: 20 OINTMENT TOPICAL at 07:09

## 2020-09-09 RX ADMIN — Medication 25 MG: at 08:09

## 2020-09-09 RX ADMIN — MORPHINE SULFATE 2 MG: 4 INJECTION INTRAVENOUS at 09:09

## 2020-09-09 RX ADMIN — ONDANSETRON HYDROCHLORIDE 4 MG: 2 SOLUTION INTRAMUSCULAR; INTRAVENOUS at 09:09

## 2020-09-09 RX ADMIN — SODIUM CHLORIDE, POTASSIUM CHLORIDE, SODIUM LACTATE AND CALCIUM CHLORIDE: 600; 310; 30; 20 INJECTION, SOLUTION INTRAVENOUS at 07:09

## 2020-09-09 RX ADMIN — ONDANSETRON 4 MG: 2 INJECTION INTRAMUSCULAR; INTRAVENOUS at 07:09

## 2020-09-09 RX ADMIN — PROPOFOL 200 MG: 10 INJECTION, EMULSION INTRAVENOUS at 07:09

## 2020-09-09 RX ADMIN — HYDROCODONE BITARTRATE AND ACETAMINOPHEN 1 TABLET: 7.5; 325 TABLET ORAL at 09:09

## 2020-09-09 RX ADMIN — CEFAZOLIN SODIUM 2 G: 2 SOLUTION INTRAVENOUS at 07:09

## 2020-09-09 NOTE — TRANSFER OF CARE
"Anesthesia Transfer of Care Note    Patient: Nereida Narayanan    Procedure(s) Performed: Procedure(s) (LRB):  ARTHROSCOPY, KNEE (Right)  EXCISION, PLICA, KNEE, ARTHROSCOPIC (Right)  ARTHROSCOPY, KNEE, WITH MENISCECTOMY (Right)  ARTHROSCOPY, KNEE, WITH CHONDROPLASTY (Right)    Patient location: PACU    Anesthesia Type: general    Transport from OR: Transported from OR on room air with adequate spontaneous ventilation    Post pain: adequate analgesia    Post assessment: no apparent anesthetic complications    Post vital signs: stable    Level of consciousness: sedated and responds to stimulation    Nausea/Vomiting: no nausea/vomiting    Complications: none    Transfer of care protocol was followed      Last vitals:   Visit Vitals  /68   Pulse 66   Temp 36.5 °C (97.7 °F) (Oral)   Resp 12   Ht 4' 11" (1.499 m)   Wt 103.9 kg (229 lb)   LMP 08/19/2020   SpO2 97%   Breastfeeding No   BMI 46.25 kg/m²     "

## 2020-09-09 NOTE — PLAN OF CARE
Patient met discharged criteria instructions reviewed with patient and family, verbalized understanding

## 2020-09-09 NOTE — DISCHARGE SUMMARY
Ms. Narayanan was admitted through same-day surgery was brought the operating room where an arthroscopy of her right knee was completed.  For full account of surgery please see the operative report.  Postoperatively she was transferred from the operating room to the recovery room and when alert awake and oriented was discharged home in stable condition with instructions to follow up in 10-12 days.

## 2020-09-09 NOTE — PLAN OF CARE
Patient arrives to PACU via stretcher monitor connected. PIV intact and infusing LR'S to gravity. Ace wrap dressing to right knee iced and elevated. VS'S Will continue to monitor.

## 2020-09-09 NOTE — INTERVAL H&P NOTE
The patient has been examined and the H&P has been reviewed:  Operative extremity signed at 07:24 hrs,./  H&P uopdated at 07:24 hrs. Pt ready to roll to OR at 07:24 hrs.    I concur with the findings and no changes have occurred since H&P was written.    Surgery risks, benefits and alternative options discussed and understood by patient/family.          Active Hospital Problems    Diagnosis  POA    Medial meniscus tear [S88.800F]  Yes      Resolved Hospital Problems   No resolved problems to display.

## 2020-09-09 NOTE — OP NOTE
[facility]  Orthopedic Surgery    9/9/2020    Nereida Narayanan  [id number]      PREOPERATIVE DIAGNOSIS:   1.  Tear of medial meniscus of right knee, current, unspecified tear type, initial encounter [S86.920D]    POSTOPERATIVE DIAGNOSIS:    1.  Medial meniscus tear, right knee.  2.  Lateral meniscus tear, right knee.  3.  Grade 4 chondromalacia medial compartment, right knee.  4.  Grade 4 chondromalacia lateral compartment, right knee.  5.  Grade 3 chondromalacia patellofemoral joint, right knee.  6.  Plica right knee.    PROCEDURE:  1.  Arthroscopic partial medial meniscectomy, right knee.  2.  Arthroscopic partial lateral meniscectomy, right knee.  3.  Arthroscopic chondroplasty medial compartment, right knee.  4.  Arthroscopic chondroplasty lateral compartment, right knee.  5.  Arthroscopic chondroplasty patellofemoral joint, right knee.  6.  Arthroscopic plica excision, right knee.    SURGEON: Aiden Clarke D.O.    ASSISTANT:  None.    ANESTHESIA:  General.    BLOOD LOSS:  Less than 5 cc.    TOURNIQUET: N/A.    DRAINS:  None.    PATHOLOGY:  Shavings.    COMPLICATION:  None.    INDICATIONS FOR PROCEDURE:   Ms. Narayanan is a 45-year-old lady who has had 8 years of right knee pain.  Walking stairs increases her symptoms while elevation and rest improve them.  She did get swelling but denied giving way or locking.  She has taken NSAIDs with help.  She has worn a brace and had an injection without resolution of her pain.  She did home exercise/physical therapy which did not resolve her symptoms..   She elected to proceed with surgery after she failed conservative management and complications to include bleeding, infection, scarring, nerve/blood vessel/tendon damage, need for further surgery, failed surgery, failure to improve, stiffness, arthritis, and possible recurrence were discussed.  She signed a consent.    PROCEDURE IN DETAIL:  The patient was brought to the operating room and was transferred to the  operating bed where all bony prominences were well padded.  General anesthesia was then administered by the Anesthesiology Department.  After general anesthesia was administered a tourniquet was applied to the upper part of the patient's right lower extremity, this was not inflated throughout the procedure.  Patient's right lower extremity was then prepped with chlorhexidine solution and draped in the normal sterile fashion.  After prepping and draping bony and soft tissue landmarks were palpated and incision sites were drawn on the patient's knee.  The incision sites were then anesthetized with a 50/50 mixture of lidocaine and Marcaine.  The patient's knee was then insufflated with irrigant solution.       Sharp incision was then made with a #11 blade at the inferolateral portal site.  A blunt trocar with cannula was then introduced into the patient's knee.  The trocar was removed and the camera was placed with inflow and outflow.  The patient's knee was then inspected in the normal fashion.  The superior pouch was inspected the patient had a large plica.  The patella was inspected the patient had grade 3 chondromalacia of her patella.  The patient's knee was flexed and patella tracking was inspected the patient had midline tracking but grade 3 chondromalacia of the femur at the femoral groove.  The medial shoulder was inspected and she had chondromalacia of the medial shoulder.  The medial gutter was entered and no major abnormalities were noted.       The medial compartment was then entered and a medial portal site was established in normal fashion with localization with an 18 gauge spinal needle, sharp incision with a #11 blade followed by expansion with a blunt trocar.  A probe was then placed in the medial compartment and the patient's medial compartment was inspected and probed in the normal fashion.  The patient had a medial meniscus tear.  This was debrided to stable meniscus with a full radius shaver and  biters.  The chondral surfaces were inspected the patient had grade 4 chondromalacia of the tibial plateau and femoral condyle.  A chondroplasty was completed with a full-radius shaver.       The intracondylar notch was then entered and the ACL and PCL were inspected and probed there was some laxity of the ACL.       The lateral compartment was then entered and the lateral meniscus was inspected and probed the patient had a lateral meniscus tear.  This was debrided to stable meniscus with a full radius shaver and biters.  The chondral surfaces were inspected and probed the patient had grade 4 chondromalacia of the femoral tibial articulation.  A chondroplasty was completed with a full-radius shaver.       The lateral gutter was then entered and no major abnormalities were noted.  The lateral shoulder was inspected no major abnormalities were noted.  A superolateral portal site was then established in normal fashion with localization with an 18 gauge spinal needle, sharp incision with a #11 blade followed by expansion with a blunt trocar.  A chondroplasty was completed on the patellofemoral joint with a full-radius shaver.  The plica was then excised with a full-radius shaver.  The patient's knee was then extensively irrigated and then evacuated with suction.  The camera and cannula were then removed from the patient's knee.       The incisions were then closed with nylon suture in a simple interrupted fashion.  Her knee was then dressed with Adaptic, sterile gauze, and an Ace wrap.  She was then awakened by anesthesia and transferred from the operating room to the recovery room in stable condition she tolerated the procedure well without complication

## 2020-09-09 NOTE — ANESTHESIA POSTPROCEDURE EVALUATION
Anesthesia Post Evaluation    Patient: Nereida Narayanan    Procedure(s) Performed: Procedure(s) (LRB):  ARTHROSCOPY, KNEE (Right)  EXCISION, PLICA, KNEE, ARTHROSCOPIC (Right)  ARTHROSCOPY, KNEE, WITH MENISCECTOMY (Right)  ARTHROSCOPY, KNEE, WITH CHONDROPLASTY (Right)    Final Anesthesia Type: general    Patient location during evaluation: PACU  Patient participation: Yes- Able to Participate  Level of consciousness: awake and alert  Post-procedure vital signs: reviewed and stable  Pain management: adequate  Airway patency: patent    PONV status at discharge: No PONV  Anesthetic complications: no      Cardiovascular status: blood pressure returned to baseline  Respiratory status: unassisted  Hydration status: euvolemic  Follow-up not needed.          Vitals Value Taken Time   /88 09/09/20 0950   Temp  09/09/20 1409   Pulse 71 09/09/20 0954   Resp 14 09/09/20 0954   SpO2 97 % 09/09/20 0954   Vitals shown include unvalidated device data.      Event Time   Out of Recovery 09:54:00         Pain/Chriss Score: Pain Rating Prior to Med Admin: 6 (9/9/2020  9:49 AM)  Chriss Score: 10 (9/9/2020  9:50 AM)  Modified Chriss Score: 18 (9/9/2020  9:50 AM)

## 2020-09-11 ENCOUNTER — TELEPHONE (OUTPATIENT)
Dept: ORTHOPEDICS | Facility: CLINIC | Age: 45
End: 2020-09-11

## 2020-09-11 NOTE — TELEPHONE ENCOUNTER
Called patient to schedule appointment for today to rule out DVT as patient said she was having cramping in the calf. No answer. LVM instructing patient to contact the clinic for appointment today.

## 2020-09-22 ENCOUNTER — OFFICE VISIT (OUTPATIENT)
Dept: ORTHOPEDICS | Facility: CLINIC | Age: 45
End: 2020-09-22
Payer: COMMERCIAL

## 2020-09-22 VITALS — OXYGEN SATURATION: 98 % | HEART RATE: 92 BPM | TEMPERATURE: 98 F

## 2020-09-22 DIAGNOSIS — Z51.89 AFTER CARE: ICD-10-CM

## 2020-09-22 DIAGNOSIS — Z48.02 ENCOUNTER FOR REMOVAL OF SUTURES: Primary | ICD-10-CM

## 2020-09-22 DIAGNOSIS — Z98.890 S/P RIGHT KNEE ARTHROSCOPY: ICD-10-CM

## 2020-09-22 PROCEDURE — 99999 PR PBB SHADOW E&M-EST. PATIENT-LVL III: ICD-10-PCS | Mod: PBBFAC,,, | Performed by: ORTHOPAEDIC SURGERY

## 2020-09-22 PROCEDURE — 99024 PR POST-OP FOLLOW-UP VISIT: ICD-10-PCS | Mod: ,,, | Performed by: ORTHOPAEDIC SURGERY

## 2020-09-22 PROCEDURE — 99999 PR PBB SHADOW E&M-EST. PATIENT-LVL III: CPT | Mod: PBBFAC,,, | Performed by: ORTHOPAEDIC SURGERY

## 2020-09-22 PROCEDURE — 99213 OFFICE O/P EST LOW 20 MIN: CPT | Mod: PBBFAC,PN | Performed by: ORTHOPAEDIC SURGERY

## 2020-09-22 PROCEDURE — 99024 POSTOP FOLLOW-UP VISIT: CPT | Mod: ,,, | Performed by: ORTHOPAEDIC SURGERY

## 2020-09-23 ENCOUNTER — PATIENT MESSAGE (OUTPATIENT)
Dept: ORTHOPEDICS | Facility: CLINIC | Age: 45
End: 2020-09-23

## 2020-09-23 NOTE — PROGRESS NOTES
Subjective:      Patient ID: Nereida Narayanan is a 45 y.o. female.    Chief Complaint: Post-op Evaluation of the Right Knee      HPI:  Ms. Narayanan returns today for 1st postop visit on arthroscopic partial medial meniscectomy and chondroplasty of her left knee.  She states she is doing well and is relatively pain-free.  Her date of surgery 09/09/2020.  She is ambulating well without pain.    ROS:  New diagnosis/surgery/prescriptions since last office visit on 09/01/2020:  Arthroscopic partial medial meniscectomy with chondroplasty right knee.      Objective:      Physical Exam:   General: AAOx3.  No acute distress  Vascular:  Pulses intact and equal bilaterally.  Capillary refill less than 3 seconds and equal bilaterally  Neurologic:  Pinprick and soft touch intact and equal bilaterally  Integment:  No ecchymosis, no errythema.  Incisions well approximated with sutures in place.  Extremity:  Knee:  Extension/flexion equal bilaterally.  Relatively no effusion right knee.  Nontender with motion.  Nontender with palpation.  Varus/valgus stressing with good endpoint equal bilaterally.  Lachman's/drawer with good endpoint equal bilaterally.  Radiography:  No new x-rays done today.      Assessment:       Impression:     1. Arthroscopic partial medial meniscectomy with chondroplasty, right knee pain         Plan:       1.  Discussed physical examination and arthroscopic findings with the patient. Nereida understands that she had a partial medial meniscectomy and chondroplasty of her knee.  She appears to be doing well.  2.  Remove sutures and place Steri-Strips.  3.  Home exercises were discussed with the patient she understands she should do quadriceps and hamstring strengthening.  4.  Offered referred to physical therapy she feels she is doing well and does not need to go to physical therapy.  5.  Any minor pain can be treated with over-the-counter medications dosed per box instructions.  6.  May return to full activities  in approximately 4-5 days.  7.  Follow up p.r.n..

## 2020-09-25 ENCOUNTER — PATIENT MESSAGE (OUTPATIENT)
Dept: ORTHOPEDICS | Facility: CLINIC | Age: 45
End: 2020-09-25

## 2020-09-25 DIAGNOSIS — Z98.890 S/P RIGHT KNEE ARTHROSCOPY: Primary | ICD-10-CM

## 2020-10-05 ENCOUNTER — PATIENT MESSAGE (OUTPATIENT)
Dept: FAMILY MEDICINE | Facility: CLINIC | Age: 45
End: 2020-10-05

## 2020-10-05 DIAGNOSIS — Z87.828 HISTORY OF MENISCAL TEAR: ICD-10-CM

## 2020-10-05 DIAGNOSIS — R60.0 BILATERAL LOWER EXTREMITY EDEMA: ICD-10-CM

## 2020-10-05 DIAGNOSIS — R25.2 LEG CRAMPS: Primary | ICD-10-CM

## 2020-10-05 NOTE — TELEPHONE ENCOUNTER
Please Advise--  Is thr anything thing I can b prescribed for arthritis   I guess cause i been putting so much pressure on my left knee that its really painful.   I hope I get relief soon.   I start physical therapy wed.

## 2020-10-07 ENCOUNTER — CLINICAL SUPPORT (OUTPATIENT)
Dept: REHABILITATION | Facility: HOSPITAL | Age: 45
End: 2020-10-07
Payer: COMMERCIAL

## 2020-10-07 DIAGNOSIS — M25.561 RIGHT KNEE PAIN, UNSPECIFIED CHRONICITY: ICD-10-CM

## 2020-10-07 DIAGNOSIS — R53.1 WEAKNESS: ICD-10-CM

## 2020-10-07 PROCEDURE — 97110 THERAPEUTIC EXERCISES: CPT | Mod: PN

## 2020-10-07 PROCEDURE — 97161 PT EVAL LOW COMPLEX 20 MIN: CPT | Mod: PN

## 2020-10-07 NOTE — PLAN OF CARE
Physical Therapy Evaluation/Plan of Care    Name: Nereida Narayanan 1975  Clinic Number: [id number]    Diagnosis:   Encounter Diagnoses   Name Primary?    Weakness     Right knee pain, unspecified chronicity      Physician: Aiden Clarke DO  Treatment Orders: PT Eval and Treat    Past Medical History:   Diagnosis Date    Morbid obesity with BMI of 45.0-49.9, adult      Current Outpatient Medications   Medication Sig    furosemide (LASIX) 20 MG tablet Take 1 tablet (20 mg total) by mouth daily as needed (leg swelling).     No current facility-administered medications for this visit.      Review of patient's allergies indicates:   Allergen Reactions    Adhesive      Causes skin tearing and redness      Precautions: standard  Evaluation Date: 10/7/20  Time In: 11:00 am  Time Out: 12:00  Visit # authorized: 12  Authorization period: 12/31/20  Plan of care Expiration: 12/30/20    Subjective     Onset Date: chronic  Date of Surgery: 9/9/20  Prior Level of Function: independent with ADL's and IADL's  Current level of Function: same as prior, pain limiting WB   Social History: patient lives with her family in a SS home with no steps to enter, she does have 5 steps with handrail at her job in [employer] [facility] [employer]  Med History: bilateral knee pain, obesity  Occupation: 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 female that presents to [facility] secondary to right knee ATS. Nereida states pain has been progressive over the past ~10 years to the point of limiting her functional mobility and quality of life. She had steroid injections bilaterally which was beneficial for 2 weeks prior to pain returning. No prior physical therapy. She elected ATS for partial medial meniscectomy and chondroplasty of her right knee. Post operatively was doing well until starting back to work last week increasing her knee pain and swelling. She has 2 jobs,  one of which requires her to be on her feet the whole shift however she does have a sleeve brace that she wears which is helpful. She has not been using cold packs at home but states she is going to the gym and using seated stepper and TM. She was advised to hold off on treadmill at this time but can continue with recumbent stepper.    Imaging: X-rays taken and revealed right knee: Degenerative changes.  Moderate narrowing medial compartment. Small joint effusion. X-ray left knee: Osteoarthritis    Pain: current 7/10, worst 8/10, best 3/10, Throbbing and Tight, constant  Radicular symptoms: none  Aggravating factors: standing  Easing factors: Aleve, wearing brace, epsom salt bath    Pts goals: to be able to ride her bike    Onset/RISHABH: insidious  Primary concern/ Chief complaints: pain and swelling    Objective     Observation: Patient is a well developed, obese female ambulating into clinic without AD demonstrating mild gait antalgia.  Posture: forward head, rounded shoulders, slump sitting posture, bilateral genu valgus and hyperextension on left      Range of Motion:   Knee Left active Right Active R passive   Flexion 117 108 110   Extension +3 0 0     Lower Extremity Strength  Right LE  Left LE    Knee extension: 4/5 Knee extension: 5/5   Knee flexion: 4-/5 Knee flexion: 5/5   Hip flexion: 4+/5 Hip flexion: 5/5   Hip extension:  4/5 Hip extension: 4+/5   Hip abduction: 4+/5 Hip abduction: 4+/5   Hip adduction: 5/5 Hip adduction 5/5   Ankle dorsiflexion: 5/5 Ankle dorsiflexion: 5/5   Ankle plantarflexion: 5/5 Ankle plantarflexion: 5/5     Special Tests: not performed secondary to recent surgical procedure performed and orthopedic precautions  Joint Mobility: minimal hypomobility noted secondary to recent surgical procedure performed  Palpation: no significant ttp noted  Sensation: intact  Flexibility: 90/90 SLR = R minimal restriction, L minimal restriction         Sunny's test: R no restriction, L no  "restriction                    Eusebio test: R no restriction, L no restriction  Edema: minimal  Homans: neg B  Functional Limitations Reports   Tool: LEFS  Score: 60% RLE impairment (32/80)    TREATMENT:  Nereida received therapeutic exercises to develop strength and endurance, flexibility for 23 minutes including:  Quad sets in sl flex w 5" h x 2 min  Quad sets w 5" h x 2 min  Heel slides x 2 min  SLR 3 x 10  Nustep Lv1 x 15 min    Nereida received the following manual therapy techniques were performed to increased myofascial/soft tissue length, mobility and pliability, increase PROM, AROM and function as well as to decrease pain applied to right knee for 6 minutes: med/lat and inf/sup patellar glides, manual stretch to quads and HS      Nereida declined ice pain at conclusion of exercise stating she will use at home as needed.    Home Exercises and Patient Education Provided    Education provided re: use of ice packs as needed for pain and swelling  - progress towards goals   - role of therapy in multi - disciplinary team, goals for therapy  Pt educated on condition, POC, and expectations in therapy.  No spiritual or educational barriers to learning provided    Home exercises: Pt will be provided HEP during course of treatment with progressions as appropriate. Pt was advised to perform these exercises free of pain, and to stop performing them if pain occurs.   Nereida demonstrated good  understanding of the education provided.     PT Evaluation Completed: Yes  Discussed Plan of Care with patient: Yes    Assessment     Nereida is a 45 y.o. female referred to outpatient physical therapy and presents to PT s/p right knee ATS. Patient demonstrates limitations as described in the problem list. Pt will benefit from physcial therapy services in order to maximize pain free and/or functional use of right knee. The following goals were discussed with the patient and patient is in agreement with them as to be addressed in " the treatment plan.   Pt prognosis is Good.   Pt will benefit from skilled outpatient Physical Therapy to address the deficits stated above and in the chart below, provide pt/family education, and to maximize pt's level of independence.     Anticipated barriers to physical therapy: none identified    Medical necessity is demonstrated by the following IMPAIRMENTS/PROBLEM LIST:  weakness, impaired balance, pain, decreased ROM, edema, impaired joint extensibility and impaired muscle length     GOALS:   Long Term Goals: 6 weeks  Pain: Decrease pain to 2/10 to allow for improved function  Strength: Improve strength in right hip and knee to 5/5 for improved LE stability  ROM: Improve ROM to within limits of right knee   Functional scale: Improve score on LEFS to 48.75% impairment  Stairs: [place] 1 flight of stairs in alternating gait pattern using 1 handrail for assist without pain or compensation  Walking: Increase walking distance/duration to 2 blocks without pain  Postures: Increase sitting and/or standing duration to 30 without pain   Transfers: Perform all transfers without increased pain or limitation  Exercise: demonstrate independence with home exercise program to maintain gains made in therapy.      Plan     Pt will be treated by physical therapy 2 times a week for 6 weeks for Pt Education, HEP, therapeutic exercises, neuromuscular re-education, joint mobilizations, modalities prn to achieve established goals. Nereida may at times be seen by a PTA as part of the Rehab Team.     Cont PT for 6 weeks.     Sammy Painter, PT    I certify the need for these services furnished under this plan of treatment and while under my care.______________________________ Physician/Referring Practitioner  Date of Signature        PT met face to face with Jonathan Favre, PTA to discuss patient's treatment plan and progress towards established goals.  Treatment will be continued as described in initial report/eval and progress  notes.  Patient will be seen by physical therapist every sixth visit and minimally once per month.

## 2020-10-09 ENCOUNTER — HOSPITAL ENCOUNTER (OUTPATIENT)
Dept: RADIOLOGY | Facility: HOSPITAL | Age: 45
Discharge: HOME | End: 2020-10-09
Attending: NURSE PRACTITIONER
Payer: COMMERCIAL

## 2020-10-09 ENCOUNTER — PATIENT MESSAGE (OUTPATIENT)
Dept: FAMILY MEDICINE | Facility: CLINIC | Age: 45
End: 2020-10-09

## 2020-10-09 DIAGNOSIS — R60.0 BILATERAL LOWER EXTREMITY EDEMA: ICD-10-CM

## 2020-10-09 DIAGNOSIS — Z87.828 HISTORY OF MENISCAL TEAR: ICD-10-CM

## 2020-10-09 DIAGNOSIS — R25.2 LEG CRAMPS: ICD-10-CM

## 2020-10-09 PROCEDURE — 93970 US LOWER EXTREMITY VEINS BILATERAL: ICD-10-PCS | Mod: 26,,, | Performed by: RADIOLOGY

## 2020-10-09 PROCEDURE — 93970 EXTREMITY STUDY: CPT | Mod: 26,,, | Performed by: RADIOLOGY

## 2020-10-09 PROCEDURE — 93970 EXTREMITY STUDY: CPT | Mod: TC

## 2020-10-12 ENCOUNTER — PATIENT MESSAGE (OUTPATIENT)
Dept: FAMILY MEDICINE | Facility: CLINIC | Age: 45
End: 2020-10-12

## 2020-10-12 ENCOUNTER — PATIENT MESSAGE (OUTPATIENT)
Dept: ORTHOPEDICS | Facility: CLINIC | Age: 45
End: 2020-10-12

## 2020-10-12 RX ORDER — MELOXICAM 7.5 MG/1
7.5 TABLET ORAL DAILY
Qty: 30 TABLET | Refills: 2 | Status: SHIPPED | OUTPATIENT
Start: 2020-10-12 | End: 2021-03-25 | Stop reason: SDUPTHER

## 2020-10-12 NOTE — PROGRESS NOTES
Please let Nereida know her US did not see a blood clot but it did how a bakers cyst  F/U with ortho as ordered

## 2020-10-13 ENCOUNTER — PATIENT MESSAGE (OUTPATIENT)
Dept: FAMILY MEDICINE | Facility: CLINIC | Age: 45
End: 2020-10-13

## 2020-10-14 ENCOUNTER — CLINICAL SUPPORT (OUTPATIENT)
Dept: REHABILITATION | Facility: HOSPITAL | Age: 45
End: 2020-10-14
Payer: COMMERCIAL

## 2020-10-14 DIAGNOSIS — R53.1 WEAKNESS: ICD-10-CM

## 2020-10-14 DIAGNOSIS — M25.561 RIGHT KNEE PAIN, UNSPECIFIED CHRONICITY: ICD-10-CM

## 2020-10-14 PROCEDURE — 97110 THERAPEUTIC EXERCISES: CPT | Mod: PN

## 2020-10-14 NOTE — PROGRESS NOTES
"                            Physical Therapy Daily Treatment Note   Name: Nereida Narayanan 1975  MRN: [id number]    Visit Date: 10/14/2020  Visit #: 2 / 12  Authorization period Expiration: 12/31/20    Plan of Care Expiration: 12/30/20  Precautions: standard  Date of Surgery: 9/9/20    Time In: 10:15 am  Time Out: 11:00 am  Total 1:1 Treatment Time: 38 min    Treatment Diagnosis:   Encounter Diagnoses   Name Primary?    Weakness     Right knee pain, unspecified chronicity      Physician: Aiden Clarke DO    Subjective   Pt reports: The results of her US were negative, no change in knee pain.     Pain Scale:  7/10 on VAS currently  Pain Location: right knee    Objective   Nereida received therapeutic exercises to develop strength, endurance, ROM, flexibility and posture for 38 minutes including:  Nustep Lv1 x 15 min  Quad sets w 5" h x 2 min  Heel slides x 2 min  SLR x 2 min  Hip add w ball squeeze, 5" h x 2 min  S/L hip abd x 2 min  Prone hip ext x 2 min  Seated HS curls w RTB x 20  FSU x 10  LSU x 10  Standing hip flex/abd/ext in // bars x 10 ea    AROM: 0-110 degrees  Home Exercises and Education Provided     Education provided re: use of ice packs as needed for pain and swelling  - progress towards goals   - role of therapy in multi - disciplinary team, goals for therapy  Pt educated on condition, POC, and expectations in therapy.  No spiritual or educational barriers to learning provided    Home exercises:  Pt will be provided HEP during course of treatment with progressions as appropriate. Pt was advised to perform these exercises free of pain, and to stop performing them if pain occurs.   Nereida demonstrated good  understanding of the education provided.     Assessment   Nereida tolerated treatment without complication or increase pain reported.     Pt prognosis is Good. Pt will continue to benefit from skilled outpatient physical therapy to address the deficits listed in the problem list chart on " initial evaluation, provide pt/family education and to maximize pt's level of independence in the home and community environment.     Medical necessity is demonstrated by the impairments and functional limitations listed on the Initial Evaluation.     Anticipated barriers to physical therapy: none identified  Pt's spiritual, cultural and educational needs considered and pt agreeable to plan of care and goals.    Plan   Continue with established Plan of Care towards Physical Therapy goals.   Discussed Plan of Care with patient: Yes    Sammy Painter, PT  10/14/2020

## 2020-10-26 ENCOUNTER — OFFICE VISIT (OUTPATIENT)
Dept: FAMILY MEDICINE | Facility: CLINIC | Age: 45
End: 2020-10-26
Payer: COMMERCIAL

## 2020-10-26 VITALS
OXYGEN SATURATION: 98 % | BODY MASS INDEX: 45.56 KG/M2 | WEIGHT: 226 LBS | DIASTOLIC BLOOD PRESSURE: 77 MMHG | TEMPERATURE: 99 F | HEIGHT: 59 IN | SYSTOLIC BLOOD PRESSURE: 129 MMHG | HEART RATE: 76 BPM | RESPIRATION RATE: 19 BRPM

## 2020-10-26 DIAGNOSIS — Z71.3 WEIGHT LOSS COUNSELING, ENCOUNTER FOR: ICD-10-CM

## 2020-10-26 DIAGNOSIS — R60.0 BILATERAL LOWER EXTREMITY EDEMA: Primary | ICD-10-CM

## 2020-10-26 PROCEDURE — 99213 OFFICE O/P EST LOW 20 MIN: CPT | Mod: S$GLB,,, | Performed by: NURSE PRACTITIONER

## 2020-10-26 PROCEDURE — 80307 DRUG TEST PRSMV CHEM ANLYZR: CPT

## 2020-10-26 PROCEDURE — 99999 PR PBB SHADOW E&M-EST. PATIENT-LVL III: CPT | Mod: PBBFAC,,, | Performed by: NURSE PRACTITIONER

## 2020-10-26 PROCEDURE — 99999 PR PBB SHADOW E&M-EST. PATIENT-LVL III: ICD-10-PCS | Mod: PBBFAC,,, | Performed by: NURSE PRACTITIONER

## 2020-10-26 PROCEDURE — 99213 PR OFFICE/OUTPT VISIT, EST, LEVL III, 20-29 MIN: ICD-10-PCS | Mod: S$GLB,,, | Performed by: NURSE PRACTITIONER

## 2020-10-26 RX ORDER — PHENTERMINE HYDROCHLORIDE 37.5 MG/1
37.5 TABLET ORAL
Qty: 30 TABLET | Refills: 0 | Status: SHIPPED | OUTPATIENT
Start: 2020-10-26 | End: 2020-11-25

## 2020-10-26 NOTE — PROGRESS NOTES
"Subjective:       Patient ID: Nereida Narayanan is a 45 y.o. female.    Chief Complaint: Obesity    Ms. Nereida Narayanan is a 45 year old female who presents to the clinic today for refill of adipex. Reports she is back in the gym 3-4 x a week. She had knee surgery and continues to have pain. Reports adipex helps lower her appetite. BMI 45.65. Denies cp palpitations    Review of Systems   Constitutional: Negative for activity change, appetite change, fatigue and fever.   Respiratory: Negative for cough, chest tightness, shortness of breath and wheezing.    Cardiovascular: Positive for leg swelling. Negative for chest pain and palpitations.   Gastrointestinal: Negative for abdominal pain, diarrhea, nausea and vomiting.   Musculoskeletal: Positive for arthralgias. Negative for myalgias.   Skin: Negative for color change.   Neurological: Negative for dizziness, tremors, syncope, weakness and headaches.   Psychiatric/Behavioral: Negative for dysphoric mood and sleep disturbance.         Reviewed family, medical, surgical, and social history.    Objective:      /77 (BP Location: Left arm, Patient Position: Sitting, BP Method: Medium (Automatic))   Pulse 76   Temp 98.8 °F (37.1 °C) (Tympanic)   Resp 19   Ht 4' 11" (1.499 m)   Wt 102.5 kg (226 lb)   SpO2 98%   BMI 45.65 kg/m²   Physical Exam  Vitals signs reviewed.   Constitutional:       General: She is not in acute distress.     Appearance: She is well-developed. She is not diaphoretic.      Comments: obese   HENT:      Head: Normocephalic.      Right Ear: Tympanic membrane normal.      Left Ear: Tympanic membrane normal.      Nose: Nose normal.      Mouth/Throat:      Mouth: Mucous membranes are moist.      Pharynx: Oropharynx is clear.   Eyes:      Conjunctiva/sclera: Conjunctivae normal.      Pupils: Pupils are equal, round, and reactive to light.   Neck:      Musculoskeletal: Normal range of motion.   Cardiovascular:      Rate and Rhythm: Normal rate and " regular rhythm.      Pulses: Normal pulses.      Heart sounds: Normal heart sounds. No murmur.   Pulmonary:      Effort: Pulmonary effort is normal. No respiratory distress.      Breath sounds: Normal breath sounds. No stridor.   Abdominal:      General: Bowel sounds are normal.      Tenderness: There is no abdominal tenderness.   Musculoskeletal: Normal range of motion.         General: Tenderness present.   Skin:     General: Skin is warm.      Capillary Refill: Capillary refill takes less than 2 seconds.   Neurological:      General: No focal deficit present.      Mental Status: She is alert and oriented to person, place, and time.   Psychiatric:         Mood and Affect: Mood normal.         Behavior: Behavior normal.         Thought Content: Thought content normal.         Judgment: Judgment normal.         Assessment:       1. Bilateral lower extremity edema    2. Weight loss counseling, encounter for    3. BMI 45.0-49.9, adult        Plan:       Bilateral lower extremity edema  -     [facility] Drug Screen  -     phentermine (ADIPEX-P) 37.5 mg tablet; Take 1 tablet (37.5 mg total) by mouth before breakfast.  Dispense: 30 tablet; Refill: 0    Weight loss counseling, encounter for  -     [facility] Drug Screen  -     phentermine (ADIPEX-P) 37.5 mg tablet; Take 1 tablet (37.5 mg total) by mouth before breakfast.  Dispense: 30 tablet; Refill: 0    BMI 45.0-49.9, adult  -     phentermine (ADIPEX-P) 37.5 mg tablet; Take 1 tablet (37.5 mg total) by mouth before breakfast.  Dispense: 30 tablet; Refill: 0        PLAN:  - Discussed with patient the plan of care  -  reviewed  - Medications reviewed. Medication side effects discussed. Patient has no questions or concerns at this time. Informed patient to notify me regarding any concerns.    - Informed patient to please notify me with any questions or concerns at anytime  - Follow up ordered for 4 weeks        Risks, benefits, and side effects were discussed with the  patient. All questions were answered to the fullest satisfaction of the patient, and pt verbalized understanding and agreement to treatment plan. Pt was to call with any new or worsening symptoms, or present to the ER.

## 2020-10-30 LAB
6MAM UR QL: NOT DETECTED
7AMINOCLONAZEPAM UR QL: NOT DETECTED
A-OH ALPRAZ UR QL: NOT DETECTED
ALPRAZ UR QL: NOT DETECTED
AMPHET UR QL SCN: NOT DETECTED
ANNOTATION COMMENT IMP: NORMAL
ANNOTATION COMMENT IMP: NORMAL
BARBITURATES UR QL: NOT DETECTED
BUPRENORPHINE UR QL: NOT DETECTED
BZE UR QL: NOT DETECTED
CARBOXYTHC UR QL: NOT DETECTED
CARISOPRODOL UR QL: NOT DETECTED
CLONAZEPAM UR QL: NOT DETECTED
CODEINE UR QL: NOT DETECTED
CREAT UR-MCNC: 190.9 MG/DL (ref 20–400)
DIAZEPAM UR QL: NOT DETECTED
ETHYL GLUCURONIDE UR QL: NOT DETECTED
FENTANYL UR QL: NOT DETECTED
HYDROCODONE UR QL: NOT DETECTED
HYDROMORPHONE UR QL: NOT DETECTED
LORAZEPAM UR QL: NOT DETECTED
MDA UR QL: NOT DETECTED
MDEA UR QL: NOT DETECTED
MDMA UR QL: NOT DETECTED
ME-PHENIDATE UR QL: NOT DETECTED
MEPERIDINE UR QL: NOT DETECTED
METHADONE UR QL: NOT DETECTED
METHAMPHET UR QL: NOT DETECTED
MIDAZOLAM UR QL SCN: NOT DETECTED
MORPHINE UR QL: NOT DETECTED
NORBUPRENORPHINE UR QL CFM: NOT DETECTED
NORDIAZEPAM UR QL: NOT DETECTED
NORFENTANYL UR QL: NOT DETECTED
NORHYDROCODONE UR QL CFM: NOT DETECTED
NOROXYCODONE UR QL CFM: NOT DETECTED
NOROXYMORPHONE: NOT DETECTED
OXAZEPAM UR QL: NOT DETECTED
OXYCODONE UR QL: NOT DETECTED
OXYMORPHONE UR QL: NOT DETECTED
PATHOLOGY STUDY: NORMAL
PCP UR QL: NOT DETECTED
PHENTERMINE UR QL: PRESENT
PROPOXYPH UR QL: NOT DETECTED
SERVICE CMNT-IMP: NORMAL
TAPENTADOL UR QL SCN: NOT DETECTED
TAPENTADOL-O-SULF: NOT DETECTED
TEMAZEPAM UR QL: NOT DETECTED
TRAMADOL UR QL: NOT DETECTED
ZOLPIDEM UR QL: NOT DETECTED

## 2020-11-16 ENCOUNTER — PATIENT MESSAGE (OUTPATIENT)
Dept: ORTHOPEDICS | Facility: CLINIC | Age: 45
End: 2020-11-16

## 2020-11-30 ENCOUNTER — PATIENT OUTREACH (OUTPATIENT)
Dept: ADMINISTRATIVE | Facility: OTHER | Age: 45
End: 2020-11-30

## 2020-11-30 NOTE — PROGRESS NOTES
Chart was reviewed for overdue Proactive [facility] Encounters (JULIANA)  topics  Updates were unable to be requested from care everywhere  Health Maintenance has been updated  [place] immunization registry triggered

## 2020-12-01 ENCOUNTER — OFFICE VISIT (OUTPATIENT)
Dept: ORTHOPEDICS | Facility: CLINIC | Age: 45
End: 2020-12-01
Payer: COMMERCIAL

## 2020-12-01 VITALS
BODY MASS INDEX: 45.56 KG/M2 | HEIGHT: 59 IN | OXYGEN SATURATION: 100 % | WEIGHT: 226 LBS | HEART RATE: 86 BPM | TEMPERATURE: 99 F | RESPIRATION RATE: 18 BRPM

## 2020-12-01 DIAGNOSIS — M23.51 CHRONIC INSTABILITY OF RIGHT KNEE: ICD-10-CM

## 2020-12-01 DIAGNOSIS — M17.11 PRIMARY OSTEOARTHRITIS OF RIGHT KNEE: Primary | ICD-10-CM

## 2020-12-01 PROCEDURE — 99999 PR PBB SHADOW E&M-EST. PATIENT-LVL III: ICD-10-PCS | Mod: PBBFAC,,, | Performed by: ORTHOPAEDIC SURGERY

## 2020-12-01 PROCEDURE — 99213 OFFICE O/P EST LOW 20 MIN: CPT | Mod: 25,S$GLB,, | Performed by: ORTHOPAEDIC SURGERY

## 2020-12-01 PROCEDURE — 99213 PR OFFICE/OUTPT VISIT, EST, LEVL III, 20-29 MIN: ICD-10-PCS | Mod: 25,S$GLB,, | Performed by: ORTHOPAEDIC SURGERY

## 2020-12-01 PROCEDURE — 20610 DRAIN/INJ JOINT/BURSA W/O US: CPT | Mod: RT,S$GLB,, | Performed by: ORTHOPAEDIC SURGERY

## 2020-12-01 PROCEDURE — 99999 PR PBB SHADOW E&M-EST. PATIENT-LVL III: CPT | Mod: PBBFAC,,, | Performed by: ORTHOPAEDIC SURGERY

## 2020-12-01 PROCEDURE — 20610 LARGE JOINT ASPIRATION/INJECTION: R KNEE: ICD-10-PCS | Mod: RT,S$GLB,, | Performed by: ORTHOPAEDIC SURGERY

## 2020-12-01 RX ORDER — TRIAMCINOLONE ACETONIDE 40 MG/ML
40 INJECTION, SUSPENSION INTRA-ARTICULAR; INTRAMUSCULAR
Status: DISCONTINUED | OUTPATIENT
Start: 2020-12-01 | End: 2020-12-01 | Stop reason: HOSPADM

## 2020-12-01 RX ADMIN — TRIAMCINOLONE ACETONIDE 40 MG: 40 INJECTION, SUSPENSION INTRA-ARTICULAR; INTRAMUSCULAR at 01:12

## 2020-12-01 NOTE — PROGRESS NOTES
Subjective:      Patient ID: Nereida Narayanan is a 45 y.o. female.    Chief Complaint: Pain of the Right Knee      HPI:  Ms. Narayanan returns today with complaints of recurrent pain in her right knee.  She had an arthroscopy of her knee completed on 09/09/2020 which showed tricompartmental arthritic changes to grade 4 chondromalacia.  She stated that her knee began to hurt several weeks ago and now she has decreased tolerance going to the gym.  She has taken NSAIDs without help.  She wears a sleeve brace which does not help.  She has noticed some swelling.    ROS:  No new diagnosis/surgery/prescriptions since last office visit on 09/22/2020.  Constitution: Negative for chills and fever.   HENT: Negative for congestion.    Eyes: Negative for blurred vision.   Cardiovascular: Negative for chest pain.   Respiratory: Negative for cough.    Endocrine: Negative for polydipsia.   Hematologic/Lymphatic: Negative for adenopathy.   Skin: Negative for flushing and itching.   Musculoskeletal: Positive for joint pain and joint swelling. Negative for gout.   Gastrointestinal: Negative for constipation, diarrhea and heartburn.   Genitourinary: Negative for nocturia.   Neurological: Negative for headaches and seizures.   Psychiatric/Behavioral: Negative for depression and substance abuse. The patient is not nervous/anxious.    Allergic/Immunologic: Negative for environmental allergies.       Objective:      Physical Exam:   General: AAOx3.  No acute distress  Vascular:  Pulses intact and equal bilaterally.  Capillary refill less than 3 seconds and equal bilaterally  Neurologic:  Pinprick and soft touch intact and equal bilaterally  Integment:  No ecchymosis, no errythema.  Incisions well healed.  Extremity:  Knee:  Extension/flexion equal bilaterally-1/122°.  Crepitus with motion right knee.  Effusion right knee.  Mildly positive patellar load/compression right knee.  Negative patella apprehension/relocation both knees.  Mild  increased excursion with valgus stressing both.  Lachman's/drawer equal bilaterally with endpoint.  Positive joint line tenderness right knee.  Negative Shasha both knees.  Toribio positive right knee.  Nontender at the anserine insertion bilaterally.  No swelling at the anserine insertion bilaterally.  Radiography:  No new x-rays done today.       Assessment:       Impression:    1.  Symptomatic tricompartmental arthritis, right knee.  2.  Micro instability, right knee      Plan:       1.  Discussed physical examination with the patient. Nereida understands that she has advanced tricompartmental arthritis in her right knee.  Treatment alternatives and outcomes were discussed with the patient she understands she could continue with conservative management such as NSAIDs, bracing, activity modification, observation, physical therapy, injections, or she could consider repeat surgery such as arthroscopy or if she wanted to be more definitive artificial joint replacement.  Discussed with the patient that she is young and it would be best if she could stave off artificial joint replacement until she was older and if she could continue with conservative management until that time it would give her better results.  She stated she did not want to proceed with any surgery right now.  2.  Offered a steroid injection to the right knee, she elected to proceed.  3.  Discussed with the patient possible viscosupplementation she understands it must be preauthorized before can be given but she may get good results with viscosupplementation.  Refer/submit for preauthorization for viscosupplementation.  4.  Thomas [employer]ikh global brace, right knee, prescription for the patient to obtain 1 was given to her.  5.  Home exercises to include quadriceps and hamstring strength were shown discussed.  6.  Offered referred to physical therapy she declined.  7.  Take NSAIDs as tolerated allowed by PCM.  8.  Follow up when viscosupplementation is  preauthorized

## 2020-12-01 NOTE — PROCEDURES
Large Joint Aspiration/Injection: R knee    Date/Time: 12/1/2020 1:00 PM  Performed by: Aiden Clarke DO  Authorized by: Aiden Clarke, DO     Consent Done?:  Yes (Verbal)  Indications:  Arthritis, diagnostic evaluation, joint swelling and pain  Site marked: the procedure site was marked    Timeout: prior to procedure the correct patient, procedure, and site was verified    Prep: patient was prepped and draped in usual sterile fashion      Details:  Needle Size:  22 G  Ultrasonic Guidance for needle placement?: No    Approach:  Anterolateral  Location:  Knee  Site:  R knee  Medications:  40 mg triamcinolone acetonide 40 mg/mL  Patient tolerance:  Patient tolerated the procedure well with no immediate complications

## 2021-01-04 ENCOUNTER — PATIENT OUTREACH (OUTPATIENT)
Dept: ADMINISTRATIVE | Facility: OTHER | Age: 46
End: 2021-01-04

## 2021-01-04 ENCOUNTER — TELEPHONE (OUTPATIENT)
Dept: ORTHOPEDICS | Facility: CLINIC | Age: 46
End: 2021-01-04

## 2021-01-18 ENCOUNTER — PATIENT MESSAGE (OUTPATIENT)
Dept: FAMILY MEDICINE | Facility: CLINIC | Age: 46
End: 2021-01-18

## 2021-01-19 ENCOUNTER — PATIENT MESSAGE (OUTPATIENT)
Dept: FAMILY MEDICINE | Facility: CLINIC | Age: 46
End: 2021-01-19

## 2021-02-12 ENCOUNTER — PATIENT MESSAGE (OUTPATIENT)
Dept: FAMILY MEDICINE | Facility: CLINIC | Age: 46
End: 2021-02-12

## 2021-02-12 ENCOUNTER — HOSPITAL ENCOUNTER (EMERGENCY)
Facility: HOSPITAL | Age: 46
Discharge: HOME | End: 2021-02-12
Attending: EMERGENCY MEDICINE
Payer: COMMERCIAL

## 2021-02-12 VITALS
OXYGEN SATURATION: 100 % | SYSTOLIC BLOOD PRESSURE: 151 MMHG | DIASTOLIC BLOOD PRESSURE: 79 MMHG | HEIGHT: 59 IN | TEMPERATURE: 98 F | BODY MASS INDEX: 38.1 KG/M2 | WEIGHT: 189 LBS | HEART RATE: 78 BPM | RESPIRATION RATE: 20 BRPM

## 2021-02-12 DIAGNOSIS — Z20.822 LAB TEST NEGATIVE FOR COVID-19 VIRUS: Primary | ICD-10-CM

## 2021-02-12 LAB — SARS-COV-2 RDRP RESP QL NAA+PROBE: NEGATIVE

## 2021-02-12 PROCEDURE — 99282 EMERGENCY DEPT VISIT SF MDM: CPT

## 2021-02-12 PROCEDURE — U0002 COVID-19 LAB TEST NON-CDC: HCPCS

## 2021-02-25 ENCOUNTER — PATIENT MESSAGE (OUTPATIENT)
Dept: ORTHOPEDICS | Facility: CLINIC | Age: 46
End: 2021-02-25

## 2021-03-09 ENCOUNTER — PATIENT OUTREACH (OUTPATIENT)
Dept: ADMINISTRATIVE | Facility: OTHER | Age: 46
End: 2021-03-09

## 2021-03-11 ENCOUNTER — OFFICE VISIT (OUTPATIENT)
Dept: ORTHOPEDICS | Facility: CLINIC | Age: 46
End: 2021-03-11
Payer: COMMERCIAL

## 2021-03-11 VITALS
WEIGHT: 189 LBS | TEMPERATURE: 98 F | HEART RATE: 83 BPM | HEIGHT: 59 IN | OXYGEN SATURATION: 100 % | BODY MASS INDEX: 38.1 KG/M2 | RESPIRATION RATE: 17 BRPM

## 2021-03-11 DIAGNOSIS — M17.11 PRIMARY OSTEOARTHRITIS OF RIGHT KNEE: Primary | ICD-10-CM

## 2021-03-11 PROCEDURE — 99999 PR PBB SHADOW E&M-EST. PATIENT-LVL III: ICD-10-PCS | Mod: PBBFAC,,, | Performed by: ORTHOPAEDIC SURGERY

## 2021-03-11 PROCEDURE — 20610 LARGE JOINT ASPIRATION/INJECTION: R KNEE: ICD-10-PCS | Mod: RT,S$GLB,, | Performed by: ORTHOPAEDIC SURGERY

## 2021-03-11 PROCEDURE — 20610 DRAIN/INJ JOINT/BURSA W/O US: CPT | Mod: RT,S$GLB,, | Performed by: ORTHOPAEDIC SURGERY

## 2021-03-11 PROCEDURE — 99213 PR OFFICE/OUTPT VISIT, EST, LEVL III, 20-29 MIN: ICD-10-PCS | Mod: 25,S$GLB,, | Performed by: ORTHOPAEDIC SURGERY

## 2021-03-11 PROCEDURE — 99213 OFFICE O/P EST LOW 20 MIN: CPT | Mod: 25,S$GLB,, | Performed by: ORTHOPAEDIC SURGERY

## 2021-03-11 PROCEDURE — 99999 PR PBB SHADOW E&M-EST. PATIENT-LVL III: CPT | Mod: PBBFAC,,, | Performed by: ORTHOPAEDIC SURGERY

## 2021-03-11 RX ORDER — TRIAMCINOLONE ACETONIDE 40 MG/ML
40 INJECTION, SUSPENSION INTRA-ARTICULAR; INTRAMUSCULAR
Status: DISCONTINUED | OUTPATIENT
Start: 2021-03-11 | End: 2021-03-11 | Stop reason: HOSPADM

## 2021-03-11 RX ADMIN — TRIAMCINOLONE ACETONIDE 40 MG: 40 INJECTION, SUSPENSION INTRA-ARTICULAR; INTRAMUSCULAR at 01:03

## 2021-03-25 DIAGNOSIS — R60.0 BILATERAL LOWER EXTREMITY EDEMA: ICD-10-CM

## 2021-03-25 RX ORDER — MELOXICAM 7.5 MG/1
7.5 TABLET ORAL DAILY
Qty: 30 TABLET | Refills: 11 | Status: SHIPPED | OUTPATIENT
Start: 2021-03-25 | End: 2022-11-08

## 2021-03-25 RX ORDER — FUROSEMIDE 20 MG/1
20 TABLET ORAL DAILY PRN
Qty: 30 TABLET | Refills: 11 | Status: SHIPPED | OUTPATIENT
Start: 2021-03-25 | End: 2022-03-03 | Stop reason: SDUPTHER

## 2021-03-26 ENCOUNTER — PATIENT MESSAGE (OUTPATIENT)
Dept: FAMILY MEDICINE | Facility: CLINIC | Age: 46
End: 2021-03-26

## 2021-03-29 ENCOUNTER — OFFICE VISIT (OUTPATIENT)
Dept: FAMILY MEDICINE | Facility: CLINIC | Age: 46
End: 2021-03-29
Payer: COMMERCIAL

## 2021-03-29 VITALS
BODY MASS INDEX: 45.96 KG/M2 | WEIGHT: 228 LBS | SYSTOLIC BLOOD PRESSURE: 138 MMHG | OXYGEN SATURATION: 98 % | HEIGHT: 59 IN | RESPIRATION RATE: 18 BRPM | HEART RATE: 89 BPM | DIASTOLIC BLOOD PRESSURE: 81 MMHG | TEMPERATURE: 98 F

## 2021-03-29 DIAGNOSIS — Z71.3 WEIGHT LOSS COUNSELING, ENCOUNTER FOR: ICD-10-CM

## 2021-03-29 DIAGNOSIS — R53.83 FATIGUE, UNSPECIFIED TYPE: ICD-10-CM

## 2021-03-29 DIAGNOSIS — R63.4 WEIGHT LOSS: Primary | ICD-10-CM

## 2021-03-29 DIAGNOSIS — R60.0 BILATERAL LOWER EXTREMITY EDEMA: ICD-10-CM

## 2021-03-29 PROCEDURE — 99214 OFFICE O/P EST MOD 30 MIN: CPT | Mod: 25,S$GLB,, | Performed by: NURSE PRACTITIONER

## 2021-03-29 PROCEDURE — 80307 DRUG TEST PRSMV CHEM ANLYZR: CPT | Performed by: NURSE PRACTITIONER

## 2021-03-29 PROCEDURE — 99214 PR OFFICE/OUTPT VISIT, EST, LEVL IV, 30-39 MIN: ICD-10-PCS | Mod: 25,S$GLB,, | Performed by: NURSE PRACTITIONER

## 2021-03-29 PROCEDURE — 96372 THER/PROPH/DIAG INJ SC/IM: CPT | Mod: S$GLB,,, | Performed by: NURSE PRACTITIONER

## 2021-03-29 PROCEDURE — 99999 PR PBB SHADOW E&M-EST. PATIENT-LVL III: ICD-10-PCS | Mod: PBBFAC,,, | Performed by: NURSE PRACTITIONER

## 2021-03-29 PROCEDURE — 96372 PR INJECTION,THERAP/PROPH/DIAG2ST, IM OR SUBCUT: ICD-10-PCS | Mod: S$GLB,,, | Performed by: NURSE PRACTITIONER

## 2021-03-29 PROCEDURE — 99999 PR PBB SHADOW E&M-EST. PATIENT-LVL III: CPT | Mod: PBBFAC,,, | Performed by: NURSE PRACTITIONER

## 2021-03-29 RX ORDER — CYANOCOBALAMIN 1000 UG/ML
1000 INJECTION, SOLUTION INTRAMUSCULAR; SUBCUTANEOUS ONCE
Status: COMPLETED | OUTPATIENT
Start: 2021-03-29 | End: 2021-03-29

## 2021-03-29 RX ORDER — TOPIRAMATE SPINKLE 15 MG/1
15 CAPSULE ORAL DAILY
Qty: 30 CAPSULE | Refills: 0 | Status: SHIPPED | OUTPATIENT
Start: 2021-03-29 | End: 2021-05-10 | Stop reason: SDUPTHER

## 2021-03-29 RX ORDER — PHENTERMINE HYDROCHLORIDE 37.5 MG/1
37.5 TABLET ORAL
Qty: 30 TABLET | Refills: 0 | Status: SHIPPED | OUTPATIENT
Start: 2021-03-29 | End: 2021-04-28

## 2021-03-29 RX ADMIN — CYANOCOBALAMIN 1000 MCG: 1000 INJECTION, SOLUTION INTRAMUSCULAR; SUBCUTANEOUS at 01:03

## 2021-04-02 LAB
6MAM UR QL: NOT DETECTED
7AMINOCLONAZEPAM UR QL: NOT DETECTED
A-OH ALPRAZ UR QL: NOT DETECTED
ALPHA-OH-MIDAZOLAM: NOT DETECTED
ALPRAZ UR QL: NOT DETECTED
AMPHET UR QL SCN: NOT DETECTED
ANNOTATION COMMENT IMP: NORMAL
ANNOTATION COMMENT IMP: NORMAL
BARBITURATES UR QL: NOT DETECTED
BUPRENORPHINE UR QL: NOT DETECTED
BZE UR QL: NOT DETECTED
CARBOXYTHC UR QL: NOT DETECTED
CARISOPRODOL UR QL: NOT DETECTED
CLONAZEPAM UR QL: NOT DETECTED
CODEINE UR QL: NOT DETECTED
CREAT UR-MCNC: 97.6 MG/DL (ref 20–400)
DIAZEPAM UR QL: NOT DETECTED
ETHYL GLUCURONIDE UR QL: PRESENT
FENTANYL UR QL: NOT DETECTED
GABAPENTIN: NOT DETECTED
HYDROCODONE UR QL: NOT DETECTED
HYDROMORPHONE UR QL: NOT DETECTED
LORAZEPAM UR QL: NOT DETECTED
MDA UR QL: NOT DETECTED
MDEA UR QL: NOT DETECTED
MDMA UR QL: NOT DETECTED
ME-PHENIDATE UR QL: NOT DETECTED
MEPERIDINE UR QL: NOT DETECTED
METHADONE UR QL: NOT DETECTED
METHAMPHET UR QL: NOT DETECTED
MIDAZOLAM UR QL SCN: NOT DETECTED
MORPHINE UR QL: NOT DETECTED
NALOXONE: NOT DETECTED
NORBUPRENORPHINE UR QL CFM: NOT DETECTED
NORDIAZEPAM UR QL: NOT DETECTED
NORFENTANYL UR QL: NOT DETECTED
NORHYDROCODONE UR QL CFM: NOT DETECTED
NOROXYCODONE UR QL CFM: NOT DETECTED
NOROXYMORPHONE UR QL SCN: NOT DETECTED
OXAZEPAM UR QL: NOT DETECTED
OXYCODONE UR QL: NOT DETECTED
OXYMORPHONE UR QL: NOT DETECTED
PATHOLOGY STUDY: NORMAL
PCP UR QL: NOT DETECTED
PHENTERMINE UR QL: PRESENT
PREGABALIN: NOT DETECTED
SERVICE CMNT-IMP: NORMAL
TAPENTADOL UR QL SCN: NOT DETECTED
TAPENTADOL-O-SULF: NOT DETECTED
TEMAZEPAM UR QL: NOT DETECTED
TRAMADOL UR QL: NOT DETECTED
ZOLPIDEM METABOLITE: NOT DETECTED
ZOLPIDEM UR QL: NOT DETECTED

## 2021-04-28 DIAGNOSIS — R63.4 WEIGHT LOSS: ICD-10-CM

## 2021-04-28 DIAGNOSIS — R60.0 BILATERAL LOWER EXTREMITY EDEMA: ICD-10-CM

## 2021-04-29 ENCOUNTER — PATIENT MESSAGE (OUTPATIENT)
Dept: FAMILY MEDICINE | Facility: CLINIC | Age: 46
End: 2021-04-29

## 2021-04-29 RX ORDER — PHENTERMINE HYDROCHLORIDE 37.5 MG/1
37.5 TABLET ORAL
Qty: 30 TABLET | Refills: 0 | OUTPATIENT
Start: 2021-04-29 | End: 2021-05-29

## 2021-05-06 ENCOUNTER — PATIENT MESSAGE (OUTPATIENT)
Dept: FAMILY MEDICINE | Facility: CLINIC | Age: 46
End: 2021-05-06

## 2021-05-10 ENCOUNTER — PATIENT MESSAGE (OUTPATIENT)
Dept: FAMILY MEDICINE | Facility: CLINIC | Age: 46
End: 2021-05-10

## 2021-05-10 DIAGNOSIS — R60.0 BILATERAL LOWER EXTREMITY EDEMA: ICD-10-CM

## 2021-05-10 DIAGNOSIS — R63.4 WEIGHT LOSS: ICD-10-CM

## 2021-05-10 RX ORDER — TOPIRAMATE SPINKLE 15 MG/1
15 CAPSULE ORAL DAILY
Qty: 30 CAPSULE | Refills: 0 | Status: SHIPPED | OUTPATIENT
Start: 2021-05-10 | End: 2021-06-17 | Stop reason: SDUPTHER

## 2021-05-14 ENCOUNTER — PATIENT MESSAGE (OUTPATIENT)
Dept: INTERNAL MEDICINE | Facility: CLINIC | Age: 46
End: 2021-05-14

## 2021-05-14 ENCOUNTER — OFFICE VISIT (OUTPATIENT)
Dept: INTERNAL MEDICINE | Facility: CLINIC | Age: 46
End: 2021-05-14
Payer: COMMERCIAL

## 2021-05-14 VITALS
BODY MASS INDEX: 46.37 KG/M2 | RESPIRATION RATE: 16 BRPM | WEIGHT: 230 LBS | HEART RATE: 62 BPM | HEIGHT: 59 IN | SYSTOLIC BLOOD PRESSURE: 130 MMHG | OXYGEN SATURATION: 99 % | DIASTOLIC BLOOD PRESSURE: 80 MMHG

## 2021-05-14 DIAGNOSIS — Z71.3 WEIGHT LOSS COUNSELING, ENCOUNTER FOR: ICD-10-CM

## 2021-05-14 DIAGNOSIS — M25.561 ACUTE PAIN OF RIGHT KNEE: Primary | ICD-10-CM

## 2021-05-14 PROCEDURE — 80307 DRUG TEST PRSMV CHEM ANLYZR: CPT | Performed by: NURSE PRACTITIONER

## 2021-05-14 PROCEDURE — 99999 PR PBB SHADOW E&M-EST. PATIENT-LVL III: ICD-10-PCS | Mod: PBBFAC,,, | Performed by: NURSE PRACTITIONER

## 2021-05-14 PROCEDURE — 99214 OFFICE O/P EST MOD 30 MIN: CPT | Mod: S$GLB,,, | Performed by: NURSE PRACTITIONER

## 2021-05-14 PROCEDURE — 99214 PR OFFICE/OUTPT VISIT, EST, LEVL IV, 30-39 MIN: ICD-10-PCS | Mod: S$GLB,,, | Performed by: NURSE PRACTITIONER

## 2021-05-14 PROCEDURE — 99999 PR PBB SHADOW E&M-EST. PATIENT-LVL III: CPT | Mod: PBBFAC,,, | Performed by: NURSE PRACTITIONER

## 2021-05-14 RX ORDER — PHENTERMINE HYDROCHLORIDE 37.5 MG/1
37.5 TABLET ORAL
Qty: 30 TABLET | Refills: 0 | Status: SHIPPED | OUTPATIENT
Start: 2021-05-14 | End: 2021-06-13

## 2021-05-20 LAB
6MAM UR QL: NOT DETECTED
7AMINOCLONAZEPAM UR QL: NOT DETECTED
A-OH ALPRAZ UR QL: NOT DETECTED
ALPHA-OH-MIDAZOLAM: NOT DETECTED
ALPRAZ UR QL: NOT DETECTED
AMPHET UR QL SCN: NOT DETECTED
ANNOTATION COMMENT IMP: NORMAL
ANNOTATION COMMENT IMP: NORMAL
BARBITURATES UR QL: NOT DETECTED
BUPRENORPHINE UR QL: NOT DETECTED
BZE UR QL: NOT DETECTED
CARBOXYTHC UR QL: NOT DETECTED
CARISOPRODOL UR QL: NOT DETECTED
CLONAZEPAM UR QL: NOT DETECTED
CODEINE UR QL: NOT DETECTED
CREAT UR-MCNC: 58.7 MG/DL (ref 20–400)
DIAZEPAM UR QL: NOT DETECTED
ETHYL GLUCURONIDE UR QL: NOT DETECTED
FENTANYL UR QL: NOT DETECTED
GABAPENTIN: NOT DETECTED
HYDROCODONE UR QL: NOT DETECTED
HYDROMORPHONE UR QL: NOT DETECTED
LORAZEPAM UR QL: NOT DETECTED
MDA UR QL: NOT DETECTED
MDEA UR QL: NOT DETECTED
MDMA UR QL: NOT DETECTED
ME-PHENIDATE UR QL: NOT DETECTED
METHADONE UR QL: NOT DETECTED
METHAMPHET UR QL: NOT DETECTED
MIDAZOLAM UR QL SCN: NOT DETECTED
MORPHINE UR QL: NOT DETECTED
NALOXONE: NOT DETECTED
NORBUPRENORPHINE UR QL CFM: NOT DETECTED
NORDIAZEPAM UR QL: NOT DETECTED
NORFENTANYL UR QL: NOT DETECTED
NORHYDROCODONE UR QL CFM: NOT DETECTED
NORMEPERIDINE UR QL CFM: NOT DETECTED
NOROXYCODONE UR QL CFM: NOT DETECTED
NOROXYMORPHONE UR QL SCN: NOT DETECTED
OXAZEPAM UR QL: NOT DETECTED
OXYCODONE UR QL: NOT DETECTED
OXYMORPHONE UR QL: NOT DETECTED
PATHOLOGY STUDY: NORMAL
PCP UR QL: NOT DETECTED
PHENTERMINE UR QL: PRESENT
PREGABALIN: NOT DETECTED
SERVICE CMNT-IMP: NORMAL
TAPENTADOL UR QL SCN: NOT DETECTED
TAPENTADOL-O-SULF: NOT DETECTED
TEMAZEPAM UR QL: NOT DETECTED
TRAMADOL UR QL: NOT DETECTED
ZOLPIDEM METABOLITE: NOT DETECTED
ZOLPIDEM UR QL: NOT DETECTED

## 2021-06-15 ENCOUNTER — PATIENT MESSAGE (OUTPATIENT)
Dept: INTERNAL MEDICINE | Facility: CLINIC | Age: 46
End: 2021-06-15

## 2021-06-17 DIAGNOSIS — R63.4 WEIGHT LOSS: ICD-10-CM

## 2021-06-17 DIAGNOSIS — R60.0 BILATERAL LOWER EXTREMITY EDEMA: ICD-10-CM

## 2021-06-17 RX ORDER — TOPIRAMATE SPINKLE 15 MG/1
15 CAPSULE ORAL DAILY
Qty: 30 CAPSULE | Refills: 0 | Status: CANCELLED | OUTPATIENT
Start: 2021-06-17 | End: 2022-06-17

## 2021-06-18 ENCOUNTER — OFFICE VISIT (OUTPATIENT)
Dept: INTERNAL MEDICINE | Facility: CLINIC | Age: 46
End: 2021-06-18
Payer: COMMERCIAL

## 2021-06-18 VITALS
OXYGEN SATURATION: 100 % | HEIGHT: 59 IN | HEART RATE: 84 BPM | SYSTOLIC BLOOD PRESSURE: 139 MMHG | BODY MASS INDEX: 44.96 KG/M2 | RESPIRATION RATE: 20 BRPM | WEIGHT: 223 LBS | DIASTOLIC BLOOD PRESSURE: 80 MMHG

## 2021-06-18 DIAGNOSIS — M25.561 ACUTE PAIN OF RIGHT KNEE: ICD-10-CM

## 2021-06-18 DIAGNOSIS — Z71.3 WEIGHT LOSS COUNSELING, ENCOUNTER FOR: ICD-10-CM

## 2021-06-18 PROCEDURE — 99213 OFFICE O/P EST LOW 20 MIN: CPT | Mod: S$GLB,,, | Performed by: NURSE PRACTITIONER

## 2021-06-18 PROCEDURE — 99213 PR OFFICE/OUTPT VISIT, EST, LEVL III, 20-29 MIN: ICD-10-PCS | Mod: S$GLB,,, | Performed by: NURSE PRACTITIONER

## 2021-06-18 PROCEDURE — 99999 PR PBB SHADOW E&M-EST. PATIENT-LVL III: CPT | Mod: PBBFAC,,, | Performed by: NURSE PRACTITIONER

## 2021-06-18 PROCEDURE — 99999 PR PBB SHADOW E&M-EST. PATIENT-LVL III: ICD-10-PCS | Mod: PBBFAC,,, | Performed by: NURSE PRACTITIONER

## 2021-06-18 RX ORDER — PHENTERMINE HYDROCHLORIDE 37.5 MG/1
37.5 CAPSULE ORAL EVERY MORNING
Qty: 30 CAPSULE | Refills: 0 | Status: SHIPPED | OUTPATIENT
Start: 2021-06-18 | End: 2021-07-18

## 2021-07-27 ENCOUNTER — PATIENT MESSAGE (OUTPATIENT)
Dept: FAMILY MEDICINE | Facility: CLINIC | Age: 46
End: 2021-07-27

## 2021-09-17 DIAGNOSIS — R60.0 BILATERAL LOWER EXTREMITY EDEMA: ICD-10-CM

## 2021-09-17 DIAGNOSIS — R63.4 WEIGHT LOSS: ICD-10-CM

## 2021-09-17 RX ORDER — MELOXICAM 7.5 MG/1
7.5 TABLET ORAL DAILY
Qty: 30 TABLET | Refills: 11 | Status: CANCELLED | OUTPATIENT
Start: 2021-09-17

## 2021-09-17 RX ORDER — FUROSEMIDE 20 MG/1
20 TABLET ORAL DAILY PRN
Qty: 30 TABLET | Refills: 11 | Status: CANCELLED | OUTPATIENT
Start: 2021-09-17 | End: 2022-09-17

## 2021-09-17 RX ORDER — TOPIRAMATE SPINKLE 15 MG/1
15 CAPSULE ORAL DAILY
Qty: 30 CAPSULE | Refills: 0 | Status: SHIPPED | OUTPATIENT
Start: 2021-09-17 | End: 2022-03-03 | Stop reason: SDUPTHER

## 2021-09-23 ENCOUNTER — OFFICE VISIT (OUTPATIENT)
Dept: FAMILY MEDICINE | Facility: CLINIC | Age: 46
End: 2021-09-23
Payer: COMMERCIAL

## 2021-09-23 VITALS
SYSTOLIC BLOOD PRESSURE: 132 MMHG | BODY MASS INDEX: 45.16 KG/M2 | OXYGEN SATURATION: 98 % | RESPIRATION RATE: 18 BRPM | DIASTOLIC BLOOD PRESSURE: 80 MMHG | HEIGHT: 59 IN | HEART RATE: 78 BPM | TEMPERATURE: 98 F | WEIGHT: 224 LBS

## 2021-09-23 DIAGNOSIS — R63.4 WEIGHT LOSS: Primary | ICD-10-CM

## 2021-09-23 DIAGNOSIS — Z12.31 ENCOUNTER FOR SCREENING MAMMOGRAM FOR BREAST CANCER: ICD-10-CM

## 2021-09-23 DIAGNOSIS — Z13.6 ENCOUNTER FOR LIPID SCREENING FOR CARDIOVASCULAR DISEASE: ICD-10-CM

## 2021-09-23 DIAGNOSIS — Z13.220 ENCOUNTER FOR LIPID SCREENING FOR CARDIOVASCULAR DISEASE: ICD-10-CM

## 2021-09-23 DIAGNOSIS — R60.0 BILATERAL LOWER EXTREMITY EDEMA: ICD-10-CM

## 2021-09-23 DIAGNOSIS — Z11.59 ENCOUNTER FOR HEPATITIS C SCREENING TEST FOR LOW RISK PATIENT: ICD-10-CM

## 2021-09-23 PROCEDURE — 99999 PR PBB SHADOW E&M-EST. PATIENT-LVL III: CPT | Mod: PBBFAC,,, | Performed by: NURSE PRACTITIONER

## 2021-09-23 PROCEDURE — 80307 DRUG TEST PRSMV CHEM ANLYZR: CPT | Performed by: NURSE PRACTITIONER

## 2021-09-23 PROCEDURE — 99214 OFFICE O/P EST MOD 30 MIN: CPT | Mod: S$GLB,,, | Performed by: NURSE PRACTITIONER

## 2021-09-23 PROCEDURE — 99999 PR PBB SHADOW E&M-EST. PATIENT-LVL III: ICD-10-PCS | Mod: PBBFAC,,, | Performed by: NURSE PRACTITIONER

## 2021-09-23 PROCEDURE — 99214 PR OFFICE/OUTPT VISIT, EST, LEVL IV, 30-39 MIN: ICD-10-PCS | Mod: S$GLB,,, | Performed by: NURSE PRACTITIONER

## 2021-09-23 RX ORDER — PHENTERMINE HYDROCHLORIDE 37.5 MG/1
37.5 CAPSULE ORAL EVERY MORNING
Qty: 30 CAPSULE | Refills: 0 | Status: SHIPPED | OUTPATIENT
Start: 2021-09-23 | End: 2021-10-25 | Stop reason: SDUPTHER

## 2021-09-28 LAB
6MAM UR QL: NOT DETECTED
7AMINOCLONAZEPAM UR QL: NOT DETECTED
A-OH ALPRAZ UR QL: NOT DETECTED
ALPHA-OH-MIDAZOLAM: NOT DETECTED
ALPRAZ UR QL: NOT DETECTED
AMPHET UR QL SCN: NOT DETECTED
ANNOTATION COMMENT IMP: NORMAL
ANNOTATION COMMENT IMP: NORMAL
BARBITURATES UR QL: NOT DETECTED
BUPRENORPHINE UR QL: NOT DETECTED
BZE UR QL: NOT DETECTED
CARBOXYTHC UR QL: NOT DETECTED
CARISOPRODOL UR QL: NOT DETECTED
CLONAZEPAM UR QL: NOT DETECTED
CODEINE UR QL: NOT DETECTED
CREAT UR-MCNC: 49.5 MG/DL (ref 20–400)
DIAZEPAM UR QL: NOT DETECTED
ETHYL GLUCURONIDE UR QL: NOT DETECTED
FENTANYL UR QL: NOT DETECTED
GABAPENTIN: NOT DETECTED
HYDROCODONE UR QL: NOT DETECTED
HYDROMORPHONE UR QL: NOT DETECTED
LORAZEPAM UR QL: NOT DETECTED
MDA UR QL: NOT DETECTED
MDEA UR QL: NOT DETECTED
MDMA UR QL: NOT DETECTED
ME-PHENIDATE UR QL: NOT DETECTED
METHADONE UR QL: NOT DETECTED
METHAMPHET UR QL: NOT DETECTED
MIDAZOLAM UR QL SCN: NOT DETECTED
MORPHINE UR QL: NOT DETECTED
NALOXONE: NOT DETECTED
NORBUPRENORPHINE UR QL CFM: NOT DETECTED
NORDIAZEPAM UR QL: NOT DETECTED
NORFENTANYL UR QL: NOT DETECTED
NORHYDROCODONE UR QL CFM: NOT DETECTED
NORMEPERIDINE UR QL CFM: NOT DETECTED
NOROXYCODONE UR QL CFM: NOT DETECTED
NOROXYMORPHONE UR QL SCN: NOT DETECTED
OXAZEPAM UR QL: NOT DETECTED
OXYCODONE UR QL: NOT DETECTED
OXYMORPHONE UR QL: NOT DETECTED
PATHOLOGY STUDY: NORMAL
PCP UR QL: NOT DETECTED
PHENTERMINE UR QL: NOT DETECTED
PREGABALIN: NOT DETECTED
SERVICE CMNT-IMP: NORMAL
TAPENTADOL UR QL SCN: NOT DETECTED
TAPENTADOL UR QL SCN: NOT DETECTED
TEMAZEPAM UR QL: NOT DETECTED
TRAMADOL UR QL: NOT DETECTED
ZOLPIDEM METABOLITE: NOT DETECTED
ZOLPIDEM UR QL: NOT DETECTED

## 2021-10-25 ENCOUNTER — OFFICE VISIT (OUTPATIENT)
Dept: FAMILY MEDICINE | Facility: CLINIC | Age: 46
End: 2021-10-25
Payer: COMMERCIAL

## 2021-10-25 VITALS
OXYGEN SATURATION: 99 % | RESPIRATION RATE: 15 BRPM | WEIGHT: 218.63 LBS | DIASTOLIC BLOOD PRESSURE: 81 MMHG | SYSTOLIC BLOOD PRESSURE: 135 MMHG | HEIGHT: 59 IN | BODY MASS INDEX: 44.08 KG/M2 | HEART RATE: 78 BPM

## 2021-10-25 DIAGNOSIS — R63.4 WEIGHT LOSS: ICD-10-CM

## 2021-10-25 DIAGNOSIS — Z11.59 ENCOUNTER FOR HEPATITIS C SCREENING TEST FOR LOW RISK PATIENT: ICD-10-CM

## 2021-10-25 DIAGNOSIS — R60.0 BILATERAL LOWER EXTREMITY EDEMA: ICD-10-CM

## 2021-10-25 PROCEDURE — 99999 PR PBB SHADOW E&M-EST. PATIENT-LVL III: ICD-10-PCS | Mod: PBBFAC,,, | Performed by: NURSE PRACTITIONER

## 2021-10-25 PROCEDURE — 99999 PR PBB SHADOW E&M-EST. PATIENT-LVL III: CPT | Mod: PBBFAC,,, | Performed by: NURSE PRACTITIONER

## 2021-10-25 PROCEDURE — 99396 PREV VISIT EST AGE 40-64: CPT | Mod: S$GLB,,, | Performed by: NURSE PRACTITIONER

## 2021-10-25 PROCEDURE — 99396 PR PREVENTIVE VISIT,EST,40-64: ICD-10-PCS | Mod: S$GLB,,, | Performed by: NURSE PRACTITIONER

## 2021-10-25 RX ORDER — PHENTERMINE HYDROCHLORIDE 37.5 MG/1
37.5 CAPSULE ORAL EVERY MORNING
Qty: 30 CAPSULE | Refills: 0 | Status: SHIPPED | OUTPATIENT
Start: 2021-10-25 | End: 2022-05-10 | Stop reason: SDUPTHER

## 2021-11-09 PROBLEM — Z01.419 WOMEN'S ANNUAL ROUTINE GYNECOLOGICAL EXAMINATION: Status: ACTIVE | Noted: 2021-11-09

## 2021-11-10 ENCOUNTER — HOSPITAL ENCOUNTER (OUTPATIENT)
Dept: RADIOLOGY | Facility: HOSPITAL | Age: 46
Discharge: HOME | End: 2021-11-10
Attending: NURSE PRACTITIONER
Payer: COMMERCIAL

## 2021-11-10 DIAGNOSIS — Z12.31 ENCOUNTER FOR SCREENING MAMMOGRAM FOR BREAST CANCER: ICD-10-CM

## 2021-11-10 PROCEDURE — 77067 SCR MAMMO BI INCL CAD: CPT | Mod: 26,,, | Performed by: RADIOLOGY

## 2021-11-10 PROCEDURE — 77063 MAMMO DIGITAL SCREENING BILAT WITH TOMO: ICD-10-PCS | Mod: 26,,, | Performed by: RADIOLOGY

## 2021-11-10 PROCEDURE — 77063 BREAST TOMOSYNTHESIS BI: CPT | Mod: 26,,, | Performed by: RADIOLOGY

## 2021-11-10 PROCEDURE — 77067 MAMMO DIGITAL SCREENING BILAT WITH TOMO: ICD-10-PCS | Mod: 26,,, | Performed by: RADIOLOGY

## 2021-11-10 PROCEDURE — 77067 SCR MAMMO BI INCL CAD: CPT | Mod: TC

## 2021-11-11 ENCOUNTER — PATIENT MESSAGE (OUTPATIENT)
Dept: FAMILY MEDICINE | Facility: CLINIC | Age: 46
End: 2021-11-11
Payer: COMMERCIAL

## 2021-11-11 ENCOUNTER — TELEPHONE (OUTPATIENT)
Dept: FAMILY MEDICINE | Facility: CLINIC | Age: 46
End: 2021-11-11
Payer: COMMERCIAL

## 2021-11-16 ENCOUNTER — PATIENT MESSAGE (OUTPATIENT)
Dept: FAMILY MEDICINE | Facility: CLINIC | Age: 46
End: 2021-11-16
Payer: COMMERCIAL

## 2021-11-30 PROBLEM — A59.9 TRICHIMONIASIS: Status: ACTIVE | Noted: 2021-11-30

## 2021-12-20 ENCOUNTER — PATIENT OUTREACH (OUTPATIENT)
Dept: ADMINISTRATIVE | Facility: OTHER | Age: 46
End: 2021-12-20
Payer: COMMERCIAL

## 2021-12-30 ENCOUNTER — PATIENT MESSAGE (OUTPATIENT)
Dept: ADMINISTRATIVE | Facility: OTHER | Age: 46
End: 2021-12-30
Payer: COMMERCIAL

## 2021-12-30 ENCOUNTER — LAB VISIT (OUTPATIENT)
Dept: PRIMARY CARE CLINIC | Facility: OTHER | Age: 46
End: 2021-12-30
Attending: INTERNAL MEDICINE
Payer: COMMERCIAL

## 2021-12-30 DIAGNOSIS — Z20.822 ENCOUNTER FOR LABORATORY TESTING FOR COVID-19 VIRUS: ICD-10-CM

## 2021-12-30 PROCEDURE — U0003 INFECTIOUS AGENT DETECTION BY NUCLEIC ACID (DNA OR RNA); SEVERE ACUTE RESPIRATORY SYNDROME CORONAVIRUS 2 (SARS-COV-2) (CORONAVIRUS DISEASE [COVID-19]), AMPLIFIED PROBE TECHNIQUE, MAKING USE OF HIGH THROUGHPUT TECHNOLOGIES AS DESCRIBED BY CMS-2020-01-R: HCPCS | Performed by: INTERNAL MEDICINE

## 2022-01-02 LAB
SARS-COV-2 RNA RESP QL NAA+PROBE: DETECTED
SARS-COV-2- CYCLE NUMBER: 15

## 2022-01-03 ENCOUNTER — PATIENT MESSAGE (OUTPATIENT)
Dept: FAMILY MEDICINE | Facility: CLINIC | Age: 47
End: 2022-01-03
Payer: COMMERCIAL

## 2022-01-03 ENCOUNTER — TELEPHONE (OUTPATIENT)
Dept: FAMILY MEDICINE | Facility: CLINIC | Age: 47
End: 2022-01-03
Payer: COMMERCIAL

## 2022-01-03 DIAGNOSIS — U07.1 COVID-19: Primary | ICD-10-CM

## 2022-01-03 RX ORDER — DOXYCYCLINE HYCLATE 100 MG
100 TABLET ORAL 2 TIMES DAILY
Qty: 20 TABLET | Refills: 0 | Status: SHIPPED | OUTPATIENT
Start: 2022-01-03 | End: 2022-01-13

## 2022-01-03 RX ORDER — CODEINE PHOSPHATE AND GUAIFENESIN 10; 100 MG/5ML; MG/5ML
5 SOLUTION ORAL 3 TIMES DAILY PRN
Qty: 150 ML | Refills: 0 | Status: SHIPPED | OUTPATIENT
Start: 2022-01-03 | End: 2022-01-13

## 2022-01-03 NOTE — TELEPHONE ENCOUNTER
Pt Covid + message previously sent to Dr Blackwell about prescribing something for pt symptoms. Waiting to see what Dr Blackwell says.

## 2022-03-04 ENCOUNTER — TELEPHONE (OUTPATIENT)
Dept: FAMILY MEDICINE | Facility: CLINIC | Age: 47
End: 2022-03-04
Payer: COMMERCIAL

## 2022-03-22 ENCOUNTER — TELEPHONE (OUTPATIENT)
Dept: FAMILY MEDICINE | Facility: CLINIC | Age: 47
End: 2022-03-22
Payer: COMMERCIAL

## 2022-03-22 NOTE — TELEPHONE ENCOUNTER
Attempted to reach pt to scheduled appt as requested lvm to call office back or pt can also schedule herself through the portal.

## 2022-04-25 DIAGNOSIS — M17.0 PRIMARY OSTEOARTHRITIS OF BOTH KNEES: Primary | ICD-10-CM

## 2022-05-10 ENCOUNTER — OFFICE VISIT (OUTPATIENT)
Dept: FAMILY MEDICINE | Facility: CLINIC | Age: 47
End: 2022-05-10
Payer: COMMERCIAL

## 2022-05-10 VITALS
WEIGHT: 238.5 LBS | BODY MASS INDEX: 48.08 KG/M2 | HEIGHT: 59 IN | OXYGEN SATURATION: 95 % | DIASTOLIC BLOOD PRESSURE: 77 MMHG | SYSTOLIC BLOOD PRESSURE: 131 MMHG

## 2022-05-10 DIAGNOSIS — Z23 NEED FOR TETANUS BOOSTER: ICD-10-CM

## 2022-05-10 DIAGNOSIS — E66.01 CLASS 3 SEVERE OBESITY DUE TO EXCESS CALORIES WITHOUT SERIOUS COMORBIDITY WITH BODY MASS INDEX (BMI) OF 45.0 TO 49.9 IN ADULT: ICD-10-CM

## 2022-05-10 DIAGNOSIS — R60.0 BILATERAL LOWER EXTREMITY EDEMA: ICD-10-CM

## 2022-05-10 DIAGNOSIS — Z12.11 COLON CANCER SCREENING: Primary | ICD-10-CM

## 2022-05-10 PROBLEM — E66.813 CLASS 3 SEVERE OBESITY DUE TO EXCESS CALORIES WITHOUT SERIOUS COMORBIDITY WITH BODY MASS INDEX (BMI) OF 45.0 TO 49.9 IN ADULT: Status: ACTIVE | Noted: 2022-05-10

## 2022-05-10 PROCEDURE — 90714 TD VACC NO PRESV 7 YRS+ IM: CPT | Mod: S$GLB,,, | Performed by: FAMILY MEDICINE

## 2022-05-10 PROCEDURE — 90714 TD VACCINE GREATER THAN OR EQUAL TO 7YO WITH PRESERVATIVE IM: ICD-10-PCS | Mod: S$GLB,,, | Performed by: FAMILY MEDICINE

## 2022-05-10 PROCEDURE — 90471 IMMUNIZATION ADMIN: CPT | Mod: 59,S$GLB,, | Performed by: FAMILY MEDICINE

## 2022-05-10 PROCEDURE — 99999 PR PBB SHADOW E&M-EST. PATIENT-LVL III: ICD-10-PCS | Mod: PBBFAC,,, | Performed by: FAMILY MEDICINE

## 2022-05-10 PROCEDURE — 99214 OFFICE O/P EST MOD 30 MIN: CPT | Mod: 25,S$GLB,, | Performed by: FAMILY MEDICINE

## 2022-05-10 PROCEDURE — 96372 THER/PROPH/DIAG INJ SC/IM: CPT | Mod: S$GLB,,, | Performed by: FAMILY MEDICINE

## 2022-05-10 PROCEDURE — 96372 PR INJECTION,THERAP/PROPH/DIAG2ST, IM OR SUBCUT: ICD-10-PCS | Mod: S$GLB,,, | Performed by: FAMILY MEDICINE

## 2022-05-10 PROCEDURE — 99999 PR PBB SHADOW E&M-EST. PATIENT-LVL III: CPT | Mod: PBBFAC,,, | Performed by: FAMILY MEDICINE

## 2022-05-10 PROCEDURE — 99214 PR OFFICE/OUTPT VISIT, EST, LEVL IV, 30-39 MIN: ICD-10-PCS | Mod: 25,S$GLB,, | Performed by: FAMILY MEDICINE

## 2022-05-10 PROCEDURE — 90471 TD VACCINE GREATER THAN OR EQUAL TO 7YO WITH PRESERVATIVE IM: ICD-10-PCS | Mod: 59,S$GLB,, | Performed by: FAMILY MEDICINE

## 2022-05-10 RX ORDER — PHENTERMINE HYDROCHLORIDE 37.5 MG/1
37.5 CAPSULE ORAL EVERY MORNING
Qty: 30 CAPSULE | Refills: 0 | Status: SHIPPED | OUTPATIENT
Start: 2022-05-10 | End: 2022-06-09 | Stop reason: SDUPTHER

## 2022-05-10 RX ORDER — CYANOCOBALAMIN 1000 UG/ML
1000 INJECTION, SOLUTION INTRAMUSCULAR; SUBCUTANEOUS
Status: COMPLETED | OUTPATIENT
Start: 2022-05-10 | End: 2022-05-10

## 2022-05-10 RX ORDER — NAPROXEN SODIUM 220 MG
220 TABLET ORAL
COMMUNITY
End: 2022-11-08

## 2022-05-10 RX ADMIN — CYANOCOBALAMIN 1000 MCG: 1000 INJECTION, SOLUTION INTRAMUSCULAR; SUBCUTANEOUS at 10:05

## 2022-05-10 NOTE — PROGRESS NOTES
Subjective:       Patient ID: Nereida Narayanan is a 47 y.o. female.    Chief Complaint: Follow-up (FitKit was given to patient on 5/10/2022 10:05 AM /Pt consents to Tetanus. Pt requesting b12./ ), Obesity (Pt would like to discuss getting something to help with weight loss.), and Foot Swelling (Bilateral. Pt states this has been ongoing off and on x2 years. Pt states she can't walk long distances. Pt feels like the Lasix is not helping. Pt state Aleve helps some.)    HPI   Follow-up. Would like a b12 shot and to restart adipex. Has had bilateral foot and leg swelling for the past 2 years. Lasix not helping anymore. Sometimes wears compression stockings. Hasn't noticed any spider veins, denies chest pain.    HM: consents to fitkit and tetanus shot    Review of Systems   Constitutional: Negative for chills and fever.   Respiratory: Negative for shortness of breath.    Cardiovascular: Positive for leg swelling. Negative for chest pain.   Musculoskeletal: Positive for arthralgias.       Past Medical History:   Diagnosis Date    Morbid obesity with BMI of 45.0-49.9, adult      Past Surgical History:   Procedure Laterality Date    ARTHROSCOPIC CHONDROPLASTY OF KNEE JOINT Right 2020    Procedure: ARTHROSCOPY, KNEE, WITH CHONDROPLASTY;  Surgeon: Aiden Clarke DO;  Location: [facility] OR;  Service: Orthopedics;  Laterality: Right;     SECTION      KNEE ARTHROSCOPY W/ MENISCECTOMY Right 2020    Procedure: ARTHROSCOPY, KNEE, WITH MENISCECTOMY;  Surgeon: Aiden Clarke DO;  Location: [facility] OR;  Service: Orthopedics;  Laterality: Right;  Equipment: Scope; [employer] Truspar Meniscus Repair System; [place] chondral drill set  Vendor: [employer]; [employer]  Table: Supine    KNEE ARTHROSCOPY W/ PLICA EXCISION Right 2020    Procedure: EXCISION, PLICA, KNEE, ARTHROSCOPIC;  Surgeon: Aiden Clarke DO;  Location: [facility] OR;  Service: Orthopedics;  Laterality: Right;     Social History     Socioeconomic History    Marital  "status: Single   Tobacco Use    Smoking status: Never Smoker    Smokeless tobacco: Never Used   Substance and Sexual Activity    Alcohol use: No    Drug use: No    Sexual activity: Yes     Partners: Male     Birth control/protection: None     Social Determinants of Health     Financial Resource Strain: Low Risk     Difficulty of Paying Living Expenses: Not very hard   Food Insecurity: No Food Insecurity    Worried About Running Out of Food in the Last Year: Never true    Ran Out of Food in the Last Year: Never true   Transportation Needs: No Transportation Needs    Lack of Transportation (Medical): No    Lack of Transportation (Non-Medical): No   Physical Activity: Sufficiently Active    Days of Exercise per Week: 7 days    Minutes of Exercise per Session: 30 min   Stress: Stress Concern Present    Feeling of Stress : To some extent   Social Connections: Unknown    Frequency of Communication with Friends and Family: More than three times a week    Frequency of Social Gatherings with Friends and Family: Once a week    Active Member of Clubs or Organizations: No    Attends Club or Organization Meetings: Never    Marital Status: Never    Housing Stability: Low Risk     Unable to Pay for Housing in the Last Year: No    Number of Places Lived in the Last Year: 1    Unstable Housing in the Last Year: No     Family History   Problem Relation Age of Onset    Hypertension Mother     Alcohol abuse Father         none    Breast cancer Neg Hx     Ovarian cancer Neg Hx        Objective:      /77 (BP Location: Right arm, Patient Position: Sitting, BP Method: Large (Automatic))   Ht 4' 11" (1.499 m)   Wt 108.2 kg (238 lb 8 oz)   LMP 04/15/2022   SpO2 95%   BMI 48.17 kg/m²   Physical Exam  Vitals reviewed.   Constitutional:       General: She is not in acute distress.     Appearance: She is obese. She is not toxic-appearing.   HENT:      Head: Normocephalic and atraumatic.   Eyes:      " General: No scleral icterus.     Conjunctiva/sclera: Conjunctivae normal.   Cardiovascular:      Rate and Rhythm: Normal rate and regular rhythm.   Pulmonary:      Effort: Pulmonary effort is normal. No respiratory distress.   Neurological:      Mental Status: She is alert and oriented to person, place, and time.   Psychiatric:         Mood and Affect: Mood normal.         Behavior: Behavior normal.         Assessment:       1. Colon cancer screening    2. Bilateral lower extremity edema    3. Class 3 severe obesity due to excess calories without serious comorbidity with body mass index (BMI) of 45.0 to 49.9 in adult    4. Need for tetanus booster        Plan:       Update labs, ekg ordered. May need echo and/or referral to vein specialist. Resuming adipex, f/u 1 month.    Colon cancer screening  -     Fecal Immunochemical Test (iFOBT); Future; Expected date: 05/10/2022    Bilateral lower extremity edema  -     Magnesium; Future; Expected date: 05/10/2022  -     Comprehensive Metabolic Panel; Future; Expected date: 05/10/2022  -     CBC Auto Differential; Future; Expected date: 05/10/2022  -     BNP; Future; Expected date: 05/10/2022  -     SCHEDULED EKG 12-LEAD (to Muse); Future    Class 3 severe obesity due to excess calories without serious comorbidity with body mass index (BMI) of 45.0 to 49.9 in adult  -     phentermine 37.5 MG capsule; Take 1 capsule (37.5 mg total) by mouth every morning.  Dispense: 30 capsule; Refill: 0  -     cyanocobalamin injection 1,000 mcg  -     SCHEDULED EKG 12-LEAD (to Muse); Future    Need for tetanus booster  -     (In Office Administered) Td Vaccine            Risks, benefits, and side effects were discussed with the patient. All questions were answered to the fullest satisfaction of the patient, and pt verbalized understanding and agreement to treatment plan. Pt was to call with any new or worsening symptoms, or present to the ER.

## 2022-05-13 ENCOUNTER — LAB VISIT (OUTPATIENT)
Dept: INTERNAL MEDICINE | Facility: CLINIC | Age: 47
End: 2022-05-13
Payer: COMMERCIAL

## 2022-05-13 DIAGNOSIS — Z11.59 ENCOUNTER FOR HEPATITIS C SCREENING TEST FOR LOW RISK PATIENT: ICD-10-CM

## 2022-05-13 DIAGNOSIS — R60.0 BILATERAL LOWER EXTREMITY EDEMA: ICD-10-CM

## 2022-05-13 LAB
ALBUMIN SERPL BCP-MCNC: 3.6 G/DL (ref 3.5–5.2)
ALP SERPL-CCNC: 67 U/L (ref 55–135)
ALT SERPL W/O P-5'-P-CCNC: 38 U/L (ref 10–44)
ANION GAP SERPL CALC-SCNC: 10 MMOL/L (ref 8–16)
AST SERPL-CCNC: 29 U/L (ref 10–40)
BASOPHILS # BLD AUTO: 0.06 K/UL (ref 0–0.2)
BASOPHILS NFR BLD: 0.8 % (ref 0–1.9)
BILIRUB SERPL-MCNC: 0.5 MG/DL (ref 0.1–1)
BNP SERPL-MCNC: 15 PG/ML (ref 0–99)
BUN SERPL-MCNC: 13 MG/DL (ref 6–20)
CALCIUM SERPL-MCNC: 9.2 MG/DL (ref 8.7–10.5)
CHLORIDE SERPL-SCNC: 104 MMOL/L (ref 95–110)
CO2 SERPL-SCNC: 22 MMOL/L (ref 23–29)
CREAT SERPL-MCNC: 0.6 MG/DL (ref 0.5–1.4)
DIFFERENTIAL METHOD: ABNORMAL
EOSINOPHIL # BLD AUTO: 0.3 K/UL (ref 0–0.5)
EOSINOPHIL NFR BLD: 3.4 % (ref 0–8)
ERYTHROCYTE [DISTWIDTH] IN BLOOD BY AUTOMATED COUNT: 14 % (ref 11.5–14.5)
EST. GFR  (AFRICAN AMERICAN): >60 ML/MIN/1.73 M^2
EST. GFR  (NON AFRICAN AMERICAN): >60 ML/MIN/1.73 M^2
GLUCOSE SERPL-MCNC: 91 MG/DL (ref 70–110)
HCT VFR BLD AUTO: 34.4 % (ref 37–48.5)
HGB BLD-MCNC: 11 G/DL (ref 12–16)
IMM GRANULOCYTES # BLD AUTO: 0.03 K/UL (ref 0–0.04)
IMM GRANULOCYTES NFR BLD AUTO: 0.4 % (ref 0–0.5)
LYMPHOCYTES # BLD AUTO: 2 K/UL (ref 1–4.8)
LYMPHOCYTES NFR BLD: 26.5 % (ref 18–48)
MAGNESIUM SERPL-MCNC: 2 MG/DL (ref 1.6–2.6)
MCH RBC QN AUTO: 27.8 PG (ref 27–31)
MCHC RBC AUTO-ENTMCNC: 32 G/DL (ref 32–36)
MCV RBC AUTO: 87 FL (ref 82–98)
MONOCYTES # BLD AUTO: 0.6 K/UL (ref 0.3–1)
MONOCYTES NFR BLD: 7.6 % (ref 4–15)
NEUTROPHILS # BLD AUTO: 4.5 K/UL (ref 1.8–7.7)
NEUTROPHILS NFR BLD: 61.3 % (ref 38–73)
NRBC BLD-RTO: 0 /100 WBC
PLATELET # BLD AUTO: 284 K/UL (ref 150–450)
PMV BLD AUTO: 11.1 FL (ref 9.2–12.9)
POTASSIUM SERPL-SCNC: 4.2 MMOL/L (ref 3.5–5.1)
PROT SERPL-MCNC: 7.1 G/DL (ref 6–8.4)
RBC # BLD AUTO: 3.96 M/UL (ref 4–5.4)
SODIUM SERPL-SCNC: 136 MMOL/L (ref 136–145)
WBC # BLD AUTO: 7.37 K/UL (ref 3.9–12.7)

## 2022-05-13 PROCEDURE — 85025 COMPLETE CBC W/AUTO DIFF WBC: CPT | Performed by: FAMILY MEDICINE

## 2022-05-13 PROCEDURE — 80053 COMPREHEN METABOLIC PANEL: CPT | Performed by: FAMILY MEDICINE

## 2022-05-13 PROCEDURE — 83735 ASSAY OF MAGNESIUM: CPT | Performed by: FAMILY MEDICINE

## 2022-05-13 PROCEDURE — 86803 HEPATITIS C AB TEST: CPT | Performed by: NURSE PRACTITIONER

## 2022-05-13 PROCEDURE — 83880 ASSAY OF NATRIURETIC PEPTIDE: CPT | Performed by: FAMILY MEDICINE

## 2022-05-16 ENCOUNTER — PATIENT MESSAGE (OUTPATIENT)
Dept: FAMILY MEDICINE | Facility: CLINIC | Age: 47
End: 2022-05-16
Payer: COMMERCIAL

## 2022-05-16 DIAGNOSIS — D64.9 ANEMIA, UNSPECIFIED TYPE: Primary | ICD-10-CM

## 2022-05-16 DIAGNOSIS — Z79.899 ENCOUNTER FOR LONG-TERM (CURRENT) USE OF MEDICATIONS: ICD-10-CM

## 2022-05-17 ENCOUNTER — LAB VISIT (OUTPATIENT)
Dept: INTERNAL MEDICINE | Facility: CLINIC | Age: 47
End: 2022-05-17
Payer: COMMERCIAL

## 2022-05-17 DIAGNOSIS — Z79.899 ENCOUNTER FOR LONG-TERM (CURRENT) USE OF MEDICATIONS: ICD-10-CM

## 2022-05-17 DIAGNOSIS — D64.9 ANEMIA, UNSPECIFIED TYPE: ICD-10-CM

## 2022-05-17 LAB
BILIRUB UR QL STRIP: NEGATIVE
CLARITY UR: ABNORMAL
COLOR UR: YELLOW
GLUCOSE UR QL STRIP: NEGATIVE
HCV AB SERPL QL IA: NEGATIVE
HGB UR QL STRIP: NEGATIVE
IRON SERPL-MCNC: 55 UG/DL (ref 30–160)
KETONES UR QL STRIP: NEGATIVE
LEUKOCYTE ESTERASE UR QL STRIP: NEGATIVE
MICROSCOPIC COMMENT: NORMAL
NITRITE UR QL STRIP: NEGATIVE
PH UR STRIP: 7 [PH] (ref 5–8)
PROT UR QL STRIP: NEGATIVE
SATURATED IRON: 10 % (ref 20–50)
SP GR UR STRIP: >=1.03 (ref 1–1.03)
TOTAL IRON BINDING CAPACITY: 543 UG/DL (ref 250–450)
TRANSFERRIN SERPL-MCNC: 367 MG/DL (ref 200–375)
URN SPEC COLLECT METH UR: ABNORMAL
UROBILINOGEN UR STRIP-ACNC: NEGATIVE EU/DL

## 2022-05-17 PROCEDURE — 82746 ASSAY OF FOLIC ACID SERUM: CPT | Performed by: FAMILY MEDICINE

## 2022-05-17 PROCEDURE — 82607 VITAMIN B-12: CPT | Performed by: FAMILY MEDICINE

## 2022-05-17 PROCEDURE — 84466 ASSAY OF TRANSFERRIN: CPT | Performed by: FAMILY MEDICINE

## 2022-05-17 PROCEDURE — 81000 URINALYSIS NONAUTO W/SCOPE: CPT | Performed by: FAMILY MEDICINE

## 2022-05-18 LAB
FOLATE SERPL-MCNC: 9.3 NG/ML (ref 4–24)
VIT B12 SERPL-MCNC: 493 PG/ML (ref 210–950)

## 2022-05-25 DIAGNOSIS — M25.561 PAIN IN BOTH KNEES, UNSPECIFIED CHRONICITY: Primary | ICD-10-CM

## 2022-05-25 DIAGNOSIS — M25.562 PAIN IN BOTH KNEES, UNSPECIFIED CHRONICITY: Primary | ICD-10-CM

## 2022-05-31 ENCOUNTER — PATIENT MESSAGE (OUTPATIENT)
Dept: ORTHOPEDICS | Facility: CLINIC | Age: 47
End: 2022-05-31
Payer: COMMERCIAL

## 2022-06-09 ENCOUNTER — HOSPITAL ENCOUNTER (OUTPATIENT)
Dept: CARDIOLOGY | Facility: HOSPITAL | Age: 47
Discharge: HOME | End: 2022-06-09
Attending: FAMILY MEDICINE
Payer: COMMERCIAL

## 2022-06-09 ENCOUNTER — OFFICE VISIT (OUTPATIENT)
Dept: FAMILY MEDICINE | Facility: CLINIC | Age: 47
End: 2022-06-09
Payer: COMMERCIAL

## 2022-06-09 VITALS
HEART RATE: 72 BPM | SYSTOLIC BLOOD PRESSURE: 120 MMHG | HEIGHT: 59 IN | WEIGHT: 230.38 LBS | OXYGEN SATURATION: 98 % | DIASTOLIC BLOOD PRESSURE: 69 MMHG | BODY MASS INDEX: 46.44 KG/M2

## 2022-06-09 DIAGNOSIS — R60.0 BILATERAL LOWER EXTREMITY EDEMA: Primary | ICD-10-CM

## 2022-06-09 DIAGNOSIS — E66.01 CLASS 3 SEVERE OBESITY DUE TO EXCESS CALORIES WITHOUT SERIOUS COMORBIDITY WITH BODY MASS INDEX (BMI) OF 45.0 TO 49.9 IN ADULT: ICD-10-CM

## 2022-06-09 DIAGNOSIS — R94.31 ABNORMAL EKG: ICD-10-CM

## 2022-06-09 DIAGNOSIS — E66.01 CLASS 3 SEVERE OBESITY DUE TO EXCESS CALORIES WITHOUT SERIOUS COMORBIDITY WITH BODY MASS INDEX (BMI) OF 45.0 TO 49.9 IN ADULT: Primary | ICD-10-CM

## 2022-06-09 DIAGNOSIS — R60.0 BILATERAL LOWER EXTREMITY EDEMA: ICD-10-CM

## 2022-06-09 PROCEDURE — 99213 PR OFFICE/OUTPT VISIT, EST, LEVL III, 20-29 MIN: ICD-10-PCS | Mod: S$GLB,,, | Performed by: FAMILY MEDICINE

## 2022-06-09 PROCEDURE — 99213 OFFICE O/P EST LOW 20 MIN: CPT | Mod: S$GLB,,, | Performed by: FAMILY MEDICINE

## 2022-06-09 PROCEDURE — 93005 ELECTROCARDIOGRAM TRACING: CPT

## 2022-06-09 PROCEDURE — 93010 EKG 12-LEAD: ICD-10-PCS | Mod: ,,, | Performed by: INTERNAL MEDICINE

## 2022-06-09 PROCEDURE — 99999 PR PBB SHADOW E&M-EST. PATIENT-LVL III: CPT | Mod: PBBFAC,,, | Performed by: FAMILY MEDICINE

## 2022-06-09 PROCEDURE — 99213 OFFICE O/P EST LOW 20 MIN: CPT | Mod: PBBFAC,PN | Performed by: FAMILY MEDICINE

## 2022-06-09 PROCEDURE — 99999 PR PBB SHADOW E&M-EST. PATIENT-LVL III: ICD-10-PCS | Mod: PBBFAC,,, | Performed by: FAMILY MEDICINE

## 2022-06-09 PROCEDURE — 93010 ELECTROCARDIOGRAM REPORT: CPT | Mod: ,,, | Performed by: INTERNAL MEDICINE

## 2022-06-09 RX ORDER — PHENTERMINE HYDROCHLORIDE 37.5 MG/1
37.5 CAPSULE ORAL EVERY MORNING
Qty: 30 CAPSULE | Refills: 0 | Status: SHIPPED | OUTPATIENT
Start: 2022-06-09 | End: 2022-11-08

## 2022-06-09 RX ORDER — FUROSEMIDE 20 MG/1
20 TABLET ORAL DAILY PRN
Qty: 30 TABLET | Refills: 2 | Status: SHIPPED | OUTPATIENT
Start: 2022-06-09 | End: 2022-06-09

## 2022-06-09 RX ORDER — FUROSEMIDE 40 MG/1
40 TABLET ORAL DAILY
Qty: 30 TABLET | Refills: 2 | Status: SHIPPED | OUTPATIENT
Start: 2022-06-09 | End: 2022-08-16 | Stop reason: SDUPTHER

## 2022-06-09 NOTE — PROGRESS NOTES
Subjective:       Patient ID: Nereida Narayanan is a 47 y.o. female.    Chief Complaint: Follow-up (1 mth f/u), Foot Swelling (Pt states this is an ongoing issue), and Leg Swelling (Pt states this is an ongoing issue.)    HPI   1 month follow-up. Needs ekg re-ordered. Due for refills of furosemide but still has leg swelling. Lost 8 lb this month with phentermine, due for a refill.     Review of Systems   Constitutional: Negative for chills and fever.   Respiratory: Negative for shortness of breath.    Cardiovascular: Positive for leg swelling. Negative for chest pain.       Past Medical History:   Diagnosis Date    Morbid obesity with BMI of 45.0-49.9, adult      Past Surgical History:   Procedure Laterality Date    ARTHROSCOPIC CHONDROPLASTY OF KNEE JOINT Right 2020    Procedure: ARTHROSCOPY, KNEE, WITH CHONDROPLASTY;  Surgeon: Aiden Clarke DO;  Location: [facility] OR;  Service: Orthopedics;  Laterality: Right;     SECTION      KNEE ARTHROSCOPY W/ MENISCECTOMY Right 2020    Procedure: ARTHROSCOPY, KNEE, WITH MENISCECTOMY;  Surgeon: Aiden Clarke DO;  Location: [facility] OR;  Service: Orthopedics;  Laterality: Right;  Equipment: Scope; [employer] Truspar Meniscus Repair System; [employer] chondral drill set  Vendor: [employer]; [employer]  Table: Supine    KNEE ARTHROSCOPY W/ PLICA EXCISION Right 2020    Procedure: EXCISION, PLICA, KNEE, ARTHROSCOPIC;  Surgeon: Aiden Clarke DO;  Location: [facility] OR;  Service: Orthopedics;  Laterality: Right;     Social History     Socioeconomic History    Marital status: Single   Tobacco Use    Smoking status: Never Smoker    Smokeless tobacco: Never Used   Substance and Sexual Activity    Alcohol use: No    Drug use: No    Sexual activity: Yes     Partners: Male     Birth control/protection: None     Social Determinants of Health     Financial Resource Strain: Low Risk     Difficulty of Paying Living Expenses: Not hard at all   Food Insecurity: No Food Insecurity  "   Worried About Running Out of Food in the Last Year: Never true    Ran Out of Food in the Last Year: Never true   Transportation Needs: No Transportation Needs    Lack of Transportation (Medical): No    Lack of Transportation (Non-Medical): No   Physical Activity: Insufficiently Active    Days of Exercise per Week: 2 days    Minutes of Exercise per Session: 20 min   Stress: No Stress Concern Present    Feeling of Stress : Not at all   Social Connections: Unknown    Frequency of Communication with Friends and Family: Twice a week    Frequency of Social Gatherings with Friends and Family: Once a week    Active Member of Clubs or Organizations: No    Attends Club or Organization Meetings: Never    Marital Status: Never    Housing Stability: Low Risk     Unable to Pay for Housing in the Last Year: No    Number of Places Lived in the Last Year: 1    Unstable Housing in the Last Year: No     Family History   Problem Relation Age of Onset    Hypertension Mother     Alcohol abuse Father         none    Breast cancer Neg Hx     Ovarian cancer Neg Hx        Objective:      /69 (BP Location: Left arm, Patient Position: Sitting, BP Method: Large (Automatic))   Pulse 72   Ht 4' 11" (1.499 m)   Wt 104.5 kg (230 lb 6.4 oz)   LMP 04/18/2022 (Approximate)   SpO2 98%   BMI 46.54 kg/m²   Physical Exam  Vitals reviewed.   Constitutional:       General: She is not in acute distress.     Appearance: She is obese. She is not toxic-appearing.   HENT:      Head: Normocephalic and atraumatic.   Cardiovascular:      Rate and Rhythm: Normal rate.   Pulmonary:      Effort: Pulmonary effort is normal. No respiratory distress.   Skin:     Coloration: Skin is not jaundiced.   Neurological:      Mental Status: She is alert and oriented to person, place, and time.         Assessment:       1. Bilateral lower extremity edema    2. Class 3 severe obesity due to excess calories without serious comorbidity with " body mass index (BMI) of 45.0 to 49.9 in adult        Plan:       Lasix increased, phentermine refilled, ekg re-ordered. BMP in 3-4 weeks. F/u 1 month.    Bilateral lower extremity edema  -     Discontinue: furosemide (LASIX) 20 MG tablet; Take 1 tablet (20 mg total) by mouth daily as needed (leg swelling).  Dispense: 30 tablet; Refill: 2  -     furosemide (LASIX) 40 MG tablet; Take 1 tablet (40 mg total) by mouth once daily.  Dispense: 30 tablet; Refill: 2  -     Basic Metabolic Panel; Future; Expected date: 06/09/2022    Class 3 severe obesity due to excess calories without serious comorbidity with body mass index (BMI) of 45.0 to 49.9 in adult  -     SCHEDULED EKG 12-LEAD (to Muse); Future  -     phentermine 37.5 MG capsule; Take 1 capsule (37.5 mg total) by mouth every morning.  Dispense: 30 capsule; Refill: 0            Risks, benefits, and side effects were discussed with the patient. All questions were answered to the fullest satisfaction of the patient, and pt verbalized understanding and agreement to treatment plan. Pt was to call with any new or worsening symptoms, or present to the ER.

## 2022-06-09 NOTE — PROGRESS NOTES
Attempted to reach patient at number provided to discuss EKG results. No answer. Left message for patient to return call to clinic or reach out on patient portal.  Attempt #1

## 2022-06-10 NOTE — PROGRESS NOTES
Attempted to reach patient at number provided to discuss EKG results. No answer. Left message for patient to return call to clinic or reach out on patient portal.  Attempt #2

## 2022-06-14 ENCOUNTER — PATIENT MESSAGE (OUTPATIENT)
Dept: FAMILY MEDICINE | Facility: CLINIC | Age: 47
End: 2022-06-14

## 2022-06-14 NOTE — PROGRESS NOTES
Attempted to reach patient at number provided to discuss EKG results. No answer. Left message for patient to return call to clinic or reach out on patient portal. Patient portal message also sent.  Attempt #3

## 2022-06-20 DIAGNOSIS — M25.569 KNEE PAIN, UNSPECIFIED CHRONICITY, UNSPECIFIED LATERALITY: Primary | ICD-10-CM

## 2022-06-28 ENCOUNTER — TELEPHONE (OUTPATIENT)
Dept: FAMILY MEDICINE | Facility: CLINIC | Age: 47
End: 2022-06-28
Payer: COMMERCIAL

## 2022-06-28 NOTE — TELEPHONE ENCOUNTER
Tried to call the patient to reschedule her appointment on July 7th. The nurse practitioner will not be in that day so I need to move her appointment. She did not answer so I left a voicemail and stated she can give me a call back or can message me through the portal. I will also be sending a portal message on behalf of changing her appointment.

## 2022-08-04 ENCOUNTER — OFFICE VISIT (OUTPATIENT)
Dept: ORTHOPEDICS | Facility: CLINIC | Age: 47
End: 2022-08-04
Payer: COMMERCIAL

## 2022-08-04 ENCOUNTER — HOSPITAL ENCOUNTER (OUTPATIENT)
Dept: RADIOLOGY | Facility: HOSPITAL | Age: 47
Discharge: HOME | End: 2022-08-04
Attending: ORTHOPAEDIC SURGERY
Payer: COMMERCIAL

## 2022-08-04 VITALS — HEIGHT: 59 IN | WEIGHT: 230.38 LBS | BODY MASS INDEX: 46.44 KG/M2 | RESPIRATION RATE: 16 BRPM

## 2022-08-04 DIAGNOSIS — M25.561 CHRONIC PAIN OF RIGHT KNEE: ICD-10-CM

## 2022-08-04 DIAGNOSIS — M25.569 KNEE PAIN, UNSPECIFIED CHRONICITY, UNSPECIFIED LATERALITY: ICD-10-CM

## 2022-08-04 DIAGNOSIS — M23.203 DEGENERATIVE TEAR OF MEDIAL MENISCUS, RIGHT: Primary | ICD-10-CM

## 2022-08-04 DIAGNOSIS — G89.29 CHRONIC PAIN OF RIGHT KNEE: ICD-10-CM

## 2022-08-04 DIAGNOSIS — M71.21 BAKER CYST, RIGHT: ICD-10-CM

## 2022-08-04 DIAGNOSIS — M17.11 PRIMARY OSTEOARTHRITIS OF RIGHT KNEE: ICD-10-CM

## 2022-08-04 PROCEDURE — 99999 PR PBB SHADOW E&M-EST. PATIENT-LVL III: ICD-10-PCS | Mod: PBBFAC,,, | Performed by: ORTHOPAEDIC SURGERY

## 2022-08-04 PROCEDURE — 99213 PR OFFICE/OUTPT VISIT, EST, LEVL III, 20-29 MIN: ICD-10-PCS | Mod: 25,S$PBB,, | Performed by: ORTHOPAEDIC SURGERY

## 2022-08-04 PROCEDURE — 99213 OFFICE O/P EST LOW 20 MIN: CPT | Mod: 25,S$PBB,, | Performed by: ORTHOPAEDIC SURGERY

## 2022-08-04 PROCEDURE — 73562 X-RAY EXAM OF KNEE 3: CPT | Mod: TC,PN,RT

## 2022-08-04 PROCEDURE — 99999 PR PBB SHADOW E&M-EST. PATIENT-LVL III: CPT | Mod: PBBFAC,,, | Performed by: ORTHOPAEDIC SURGERY

## 2022-08-04 PROCEDURE — 99213 OFFICE O/P EST LOW 20 MIN: CPT | Mod: PBBFAC,PN,25 | Performed by: ORTHOPAEDIC SURGERY

## 2022-08-04 PROCEDURE — 73562 X-RAY EXAM OF KNEE 3: CPT | Mod: 26,RT,, | Performed by: RADIOLOGY

## 2022-08-04 PROCEDURE — 20610 LARGE JOINT ASPIRATION/INJECTION: R KNEE: ICD-10-PCS | Mod: S$PBB,,, | Performed by: ORTHOPAEDIC SURGERY

## 2022-08-04 PROCEDURE — 73562 XR KNEE 3 VIEW RIGHT: ICD-10-PCS | Mod: 26,RT,, | Performed by: RADIOLOGY

## 2022-08-04 PROCEDURE — 20610 DRAIN/INJ JOINT/BURSA W/O US: CPT | Mod: PBBFAC,PN | Performed by: ORTHOPAEDIC SURGERY

## 2022-08-04 RX ORDER — TRIAMCINOLONE ACETONIDE 40 MG/ML
40 INJECTION, SUSPENSION INTRA-ARTICULAR; INTRAMUSCULAR
Status: DISCONTINUED | OUTPATIENT
Start: 2022-08-04 | End: 2022-08-04 | Stop reason: HOSPADM

## 2022-08-04 RX ADMIN — TRIAMCINOLONE ACETONIDE 40 MG: 40 INJECTION, SUSPENSION INTRA-ARTICULAR; INTRAMUSCULAR at 11:08

## 2022-08-04 NOTE — PROGRESS NOTES
Subjective:      Patient ID: Nereida Narayanan is a 47 y.o. female.    Chief Complaint: Pain and Swelling of the Right Knee      HPI:  Ms. Narayanan returns today with complaints of recurrent pain in her right knee.  She stated it started approximately 6 months ago and has remained steady since.  Her pain began after she had a walk-in stand for extended periods.  Now standing for extended periods increases her symptoms while rest and elevation decreases them.  She gets swelling giving way but denied locking.  She has taken NSAIDs with help.  She has not worn a brace, done physical therapy, nor had injections.  She had an arthroscopy of her right knee on 09/09/2020 which gave her relief of pain until this most recent exacerbation.  She does have arthroscopically proven grade 4 chondromalacia of all 3 compartments.    ROS:  No new diagnosis/surgery/prescriptions since last office visit on 03/11/2021  Constitution: Negative for chills and fever.   HENT: Negative for congestion.    Eyes: Negative for blurred vision.   Cardiovascular: Negative for chest pain.   Respiratory: Negative for cough.    Endocrine: Negative for polydipsia.   Hematologic/Lymphatic: Negative for adenopathy.   Skin: Negative for flushing and itching.   Musculoskeletal: Positive for joint pain and joint swelling. Negative for gout.   Gastrointestinal: Negative for constipation, diarrhea and heartburn.   Genitourinary: Negative for nocturia.   Neurological: Negative for headaches and seizures.   Psychiatric/Behavioral: Negative for depression and substance abuse. The patient is not nervous/anxious.    Allergic/Immunologic: Negative for environmental allergies.       Objective:      Physical Exam:   General: AAOx3.  No acute distress  Vascular:  Pulses intact and equal bilaterally.  Capillary refill less than 3 seconds and equal bilaterally  Neurologic:  Pinprick and soft touch intact and equal bilaterally  Integment:  No ecchymosis, no  errythema  Extremity:  Knee:  Extension/flexion equal bilaterally 0/118 degrees.  Crepitus with motion both knees.  Mild effusion right knee.  Baker cyst right knee.  Drop home positive right knee.  Negative patellar load/compression bilaterally.  Negative patella apprehension/relocation bilaterally.  Varus stressing equal bilaterally with endpoint.  Valgus stressing with mild increased excursion both knees.  Lachman's/drawer equal bilaterally with endpoint.  Positive medial joint line tenderness right knee.  Shasha negative bilaterally.  [place] positive right knee.  Nontender at the anserine insertion bilaterally.  No swelling at the anserine insertion bilaterally.  Radiography:  Personally reviewed x-rays of the right knee completed on 08/04/2022 showed tricompartmental arthritic changes with early osteophytes.      Assessment:       Impression:     1. Degenerative tear of medial meniscus, right    2. Primary osteoarthritis of right knee    3. Baker cyst, right    4. Chronic pain of right knee          Plan:       1.  Discussed physical examination and radiographic findings with the patient. Nereida understands that she has a degenerative medial meniscus of her right knee along with exacerbation of arthritis.  Treatment alternatives and outcomes were discussed with the patient she understands she could continue with conservative management such as observation, activity modification, NSAIDs, bracing, physical therapy, injections, or she could consider surgical intervention such as arthroscopy versus total knee arthroplasty.  Recommend she trial conservative management a little bit longer and if she fails further conservative care then consider surgical intervention.  2. Offered a steroid injection to the right knee, she elected to proceed.  3. Recommend the patient purchase a hinged knee brace to wear on her knee.  4. Take NSAIDs as tolerated allowed by PCM.  5. Start doing a home exercise program such as  quadriceps and hamstring strengthening.  6. Follow up p.r.n. if she has not improved by next visit may discuss surgical intervention at that time.

## 2022-08-04 NOTE — PROCEDURES
Large Joint Aspiration/Injection: R knee    Date/Time: 8/4/2022 11:00 AM  Performed by: Aiden Clarke DO  Authorized by: Aiden Clarke, DO     Consent Done?:  Yes (Verbal)  Indications:  Arthritis, diagnostic evaluation, joint swelling and pain  Site marked: the procedure site was marked    Timeout: prior to procedure the correct patient, procedure, and site was verified    Prep: patient was prepped and draped in usual sterile fashion      Details:  Needle Size:  22 G  Ultrasonic Guidance for needle placement?: No    Approach:  Anterolateral  Location:  Knee  Site:  R knee  Medications:  40 mg triamcinolone acetonide 40 mg/mL  Patient tolerance:  Patient tolerated the procedure well with no immediate complications

## 2022-09-15 ENCOUNTER — PATIENT MESSAGE (OUTPATIENT)
Dept: ADMINISTRATIVE | Facility: HOSPITAL | Age: 47
End: 2022-09-15
Payer: COMMERCIAL

## 2022-09-22 ENCOUNTER — TELEPHONE (OUTPATIENT)
Dept: FAMILY MEDICINE | Facility: CLINIC | Age: 47
End: 2022-09-22
Payer: COMMERCIAL

## 2022-09-22 NOTE — TELEPHONE ENCOUNTER
Spoke with pt schedule appt with Maria C Lemus NP for med refill(Adipex) on 10/7/22 @ 11:20am. Advised pt new family medicine providers do not prescribe this medication.

## 2022-10-31 ENCOUNTER — PATIENT MESSAGE (OUTPATIENT)
Dept: ORTHOPEDICS | Facility: CLINIC | Age: 47
End: 2022-10-31
Payer: COMMERCIAL

## 2022-10-31 ENCOUNTER — TELEPHONE (OUTPATIENT)
Dept: ORTHOPEDICS | Facility: CLINIC | Age: 47
End: 2022-10-31
Payer: COMMERCIAL

## 2022-10-31 NOTE — TELEPHONE ENCOUNTER
Left message requesting tp call back to discuss scheduled appointment. Sending message via pt portal also.

## 2022-10-31 NOTE — TELEPHONE ENCOUNTER
Returned call. M for patient to call back in order to address her questions about a referral.    ----- Message from Jules Call sent at 10/31/2022  4:45 PM CDT -----  Regarding: needs a referral, call pt   Contact: pt   needs a referral, call pt

## 2022-11-08 ENCOUNTER — OFFICE VISIT (OUTPATIENT)
Dept: PODIATRY | Facility: CLINIC | Age: 47
End: 2022-11-08
Payer: COMMERCIAL

## 2022-11-08 VITALS
DIASTOLIC BLOOD PRESSURE: 82 MMHG | RESPIRATION RATE: 18 BRPM | BODY MASS INDEX: 44.15 KG/M2 | HEART RATE: 62 BPM | SYSTOLIC BLOOD PRESSURE: 130 MMHG | WEIGHT: 219 LBS | HEIGHT: 59 IN

## 2022-11-08 DIAGNOSIS — G57.53 TARSAL TUNNEL SYNDROME, BILATERAL: ICD-10-CM

## 2022-11-08 DIAGNOSIS — R60.0 EDEMA OF BOTH LOWER EXTREMITIES: ICD-10-CM

## 2022-11-08 DIAGNOSIS — G57.92 NEURITIS OF LEFT FOOT: ICD-10-CM

## 2022-11-08 DIAGNOSIS — G57.80 SURAL NEURITIS, UNSPECIFIED LATERALITY: ICD-10-CM

## 2022-11-08 DIAGNOSIS — G57.91 NEURITIS OF RIGHT FOOT: Primary | ICD-10-CM

## 2022-11-08 PROCEDURE — 99203 PR OFFICE/OUTPT VISIT, NEW, LEVL III, 30-44 MIN: ICD-10-PCS | Mod: 25,S$GLB,, | Performed by: PODIATRIST

## 2022-11-08 PROCEDURE — 3079F DIAST BP 80-89 MM HG: CPT | Mod: CPTII,S$GLB,, | Performed by: PODIATRIST

## 2022-11-08 PROCEDURE — 3075F PR MOST RECENT SYSTOLIC BLOOD PRESS GE 130-139MM HG: ICD-10-PCS | Mod: CPTII,S$GLB,, | Performed by: PODIATRIST

## 2022-11-08 PROCEDURE — 3079F PR MOST RECENT DIASTOLIC BLOOD PRESSURE 80-89 MM HG: ICD-10-PCS | Mod: CPTII,S$GLB,, | Performed by: PODIATRIST

## 2022-11-08 PROCEDURE — 1159F MED LIST DOCD IN RCRD: CPT | Mod: CPTII,S$GLB,, | Performed by: PODIATRIST

## 2022-11-08 PROCEDURE — 96372 PR INJECTION,THERAP/PROPH/DIAG2ST, IM OR SUBCUT: ICD-10-PCS | Mod: S$GLB,,, | Performed by: PODIATRIST

## 2022-11-08 PROCEDURE — 3008F PR BODY MASS INDEX (BMI) DOCUMENTED: ICD-10-PCS | Mod: CPTII,S$GLB,, | Performed by: PODIATRIST

## 2022-11-08 PROCEDURE — 3075F SYST BP GE 130 - 139MM HG: CPT | Mod: CPTII,S$GLB,, | Performed by: PODIATRIST

## 2022-11-08 PROCEDURE — 99999 PR PBB SHADOW E&M-EST. PATIENT-LVL III: ICD-10-PCS | Mod: PBBFAC,,, | Performed by: PODIATRIST

## 2022-11-08 PROCEDURE — 96372 THER/PROPH/DIAG INJ SC/IM: CPT | Mod: S$GLB,,, | Performed by: PODIATRIST

## 2022-11-08 PROCEDURE — 3008F BODY MASS INDEX DOCD: CPT | Mod: CPTII,S$GLB,, | Performed by: PODIATRIST

## 2022-11-08 PROCEDURE — 1159F PR MEDICATION LIST DOCUMENTED IN MEDICAL RECORD: ICD-10-PCS | Mod: CPTII,S$GLB,, | Performed by: PODIATRIST

## 2022-11-08 PROCEDURE — 99999 PR PBB SHADOW E&M-EST. PATIENT-LVL III: CPT | Mod: PBBFAC,,, | Performed by: PODIATRIST

## 2022-11-08 PROCEDURE — 99203 OFFICE O/P NEW LOW 30 MIN: CPT | Mod: 25,S$GLB,, | Performed by: PODIATRIST

## 2022-11-08 RX ORDER — BETAMETHASONE SODIUM PHOSPHATE AND BETAMETHASONE ACETATE 3; 3 MG/ML; MG/ML
18 INJECTION, SUSPENSION INTRA-ARTICULAR; INTRALESIONAL; INTRAMUSCULAR; SOFT TISSUE
Status: COMPLETED | OUTPATIENT
Start: 2022-11-08 | End: 2022-11-08

## 2022-11-08 RX ADMIN — BETAMETHASONE SODIUM PHOSPHATE AND BETAMETHASONE ACETATE 18 MG: 3; 3 INJECTION, SUSPENSION INTRA-ARTICULAR; INTRALESIONAL; INTRAMUSCULAR; SOFT TISSUE at 11:11

## 2022-11-12 NOTE — PROGRESS NOTES
Subjective:       Patient ID: Nereida Narayanan is a 47 y.o. female.    Chief Complaint: Foot Swelling, Foot Pain, and Leg Problem  Patient presents with complaint of right foot pain and swelling for almost 2 years.  She does relate it is present on the left side but not as severe.  Relates most of the time she is able to deal with the discomfort, however she started to experience a significant amount of sharp shooting pain from the foot to the knee.  Since this has been present for such a long time she is not sure of the initial cause, possibly started after right knee surgery which was 2 years ago, pain has progressed gradually, no foot injury.  Relates wearing comfortable tennis shoes, on her feet a lot.  Pain level 8/10    Past Medical History:   Diagnosis Date    Morbid obesity with BMI of 45.0-49.9, adult      Past Surgical History:   Procedure Laterality Date    ARTHROSCOPIC CHONDROPLASTY OF KNEE JOINT Right 2020    Procedure: ARTHROSCOPY, KNEE, WITH CHONDROPLASTY;  Surgeon: Aiden Clarke DO;  Location: [facility] OR;  Service: Orthopedics;  Laterality: Right;     SECTION      KNEE ARTHROSCOPY W/ MENISCECTOMY Right 2020    Procedure: ARTHROSCOPY, KNEE, WITH MENISCECTOMY;  Surgeon: Aiden Clarke DO;  Location: [facility] OR;  Service: Orthopedics;  Laterality: Right;  Equipment: Scope; [employer] Truspar Meniscus Repair System; [place] chondral drill set  Vendor: [employer]; [place]  Table: Supine    KNEE ARTHROSCOPY W/ PLICA EXCISION Right 2020    Procedure: EXCISION, PLICA, KNEE, ARTHROSCOPIC;  Surgeon: Aiden Clarke DO;  Location: [facility] OR;  Service: Orthopedics;  Laterality: Right;     Family History   Problem Relation Age of Onset    Hypertension Mother     Alcohol abuse Father         none    Breast cancer Neg Hx     Ovarian cancer Neg Hx      Social History     Socioeconomic History    Marital status: Single   Tobacco Use    Smoking status: Never    Smokeless tobacco: Never   Substance and Sexual  Activity    Alcohol use: No    Drug use: No    Sexual activity: Yes     Partners: Male     Birth control/protection: None     Social Determinants of Health     Financial Resource Strain: Low Risk     Difficulty of Paying Living Expenses: Not very hard   Food Insecurity: No Food Insecurity    Worried About Running Out of Food in the Last Year: Never true    Ran Out of Food in the Last Year: Never true   Transportation Needs: No Transportation Needs    Lack of Transportation (Medical): No    Lack of Transportation (Non-Medical): No   Physical Activity: Insufficiently Active    Days of Exercise per Week: 5 days    Minutes of Exercise per Session: 20 min   Stress: Stress Concern Present    Feeling of Stress : To some extent   Social Connections: Unknown    Frequency of Communication with Friends and Family: Twice a week    Frequency of Social Gatherings with Friends and Family: Twice a week    Active Member of Clubs or Organizations: No    Attends Club or Organization Meetings: Never    Marital Status: Never    Housing Stability: Unknown    Unable to Pay for Housing in the Last Year: Patient refused    Number of Places Lived in the Last Year: 1    Unstable Housing in the Last Year: No       Current Outpatient Medications   Medication Sig Dispense Refill    furosemide (LASIX) 40 MG tablet Take 1 tablet (40 mg total) by mouth once daily. 30 tablet 0    topiramate (TOPAMAX) 15 MG capsule Take 1 capsule (15 mg total) by mouth once daily. 30 capsule 0     No current facility-administered medications for this visit.     Review of patient's allergies indicates:   Allergen Reactions    Adhesive      Causes skin tearing and redness        Review of Systems   HENT:  Negative for congestion.    Respiratory:  Negative for cough and shortness of breath.    Cardiovascular:  Positive for leg swelling.   All other systems reviewed and are negative.    Objective:      Vitals:    11/08/22 1034   BP: 130/82   Pulse: 62   Resp: 18  "  Weight: 99.3 kg (219 lb)   Height: 4' 11" (1.499 m)     Physical Exam  Vitals and nursing note reviewed.   Constitutional:       General: She is not in acute distress.  Cardiovascular:      Pulses:           Dorsalis pedis pulses are 2+ on the right side and 2+ on the left side.        Posterior tibial pulses are 2+ on the right side and 2+ on the left side.   Pulmonary:      Effort: Pulmonary effort is normal.   Musculoskeletal:         General: Swelling and tenderness present.      Right foot: No deformity.      Left foot: No deformity.      Comments: Pain forefoot R>L   Feet:      Right foot:      Skin integrity: Skin integrity normal.      Left foot:      Skin integrity: Skin integrity normal.   Skin:     Capillary Refill: Capillary refill takes less than 2 seconds.   Neurological:      General: No focal deficit present.      Comments: Patient has pain upon palpation dorsal cutaneous nerves, common plantar digital nerves, sural and tarsal tunnel right foot.  Mild through the common plantar digital nerves and cutaneous nerves left foot   Psychiatric:         Mood and Affect: Mood normal.         Behavior: Behavior normal.         Thought Content: Thought content normal.         Judgment: Judgment normal.                  Assessment:       1. Neuritis of right foot    2. Edema of both lower extremities    3. Tarsal tunnel syndrome, bilateral    4. Sural neuritis, unspecified laterality    5. Neuritis of left foot        Plan:         18 MG BETAMETHASONE IM RIGHT HIP      Reviewed neuritis at length with patient and advised this is unfortunately throughout the right foot, affecting between the toes, top of the foot inside and outside of the ankle, it is difficult to assess origin.  We discussed at length topical treatments including ice/cool therapy in frequency this should be performed.  Reviewed Voltaren gel apply daily as directed.  Discussed oral anti-inflammatories at length, over-the-counter remedies, taken " as directed x2 weeks.  Take with food and discontinue if stomach upset.  Advised all of these treatments work best in combination with cortisone injection.  We discussed one time intramuscular cortisone injection, effectiveness in decreasing inflammation, potential side effects and length of time injection may be beneficial.  Had a lengthy discussion regarding appropriate shoes and advised all treatment should be used for both feet  Needs to rest and elevate feet when she gets home from work  If significant improvement with these conservative treatments would like her to gradually start adjusting to compression socks or hose in 1 week, wear daily as long as well tolerated and beneficial  Advised potential complications due to length of time pain and inflammation has been present  Patient was in understanding and agreement with treatment plan, did want to pursue IM cortisone injection  I counseled the patient on their conditions, implications and medical management.  Instructed patient/family to contact the office with any changes, questions, concerns, worsening of symptoms.   Total face to face time, exam, assessment, treatment, discussion, documentation 30 minutes, more than half this time spent on consultation and coordination of care.   Follow up 2 weeks    This note was created using [employer] voice recognition software that occasionally misinterpreted phrases or words.

## 2022-11-13 ENCOUNTER — PATIENT MESSAGE (OUTPATIENT)
Dept: FAMILY MEDICINE | Facility: CLINIC | Age: 47
End: 2022-11-13
Payer: COMMERCIAL

## 2022-12-14 ENCOUNTER — HOSPITAL ENCOUNTER (OUTPATIENT)
Dept: RADIOLOGY | Facility: HOSPITAL | Age: 47
Discharge: HOME | End: 2022-12-14
Attending: ORTHOPAEDIC SURGERY
Payer: COMMERCIAL

## 2022-12-14 ENCOUNTER — TELEPHONE (OUTPATIENT)
Dept: FAMILY MEDICINE | Facility: CLINIC | Age: 47
End: 2022-12-14
Payer: COMMERCIAL

## 2022-12-14 ENCOUNTER — OFFICE VISIT (OUTPATIENT)
Dept: ORTHOPEDICS | Facility: CLINIC | Age: 47
End: 2022-12-14
Payer: COMMERCIAL

## 2022-12-14 VITALS — BODY MASS INDEX: 47.98 KG/M2 | HEIGHT: 59 IN | RESPIRATION RATE: 16 BRPM | WEIGHT: 238 LBS

## 2022-12-14 DIAGNOSIS — M25.561 CHRONIC PAIN OF BOTH KNEES: Primary | ICD-10-CM

## 2022-12-14 DIAGNOSIS — G89.29 CHRONIC PAIN OF BOTH KNEES: Primary | ICD-10-CM

## 2022-12-14 DIAGNOSIS — M17.11 PRIMARY OSTEOARTHRITIS OF RIGHT KNEE: Primary | ICD-10-CM

## 2022-12-14 DIAGNOSIS — G89.29 CHRONIC PAIN OF BOTH KNEES: ICD-10-CM

## 2022-12-14 DIAGNOSIS — M25.562 CHRONIC PAIN OF BOTH KNEES: ICD-10-CM

## 2022-12-14 DIAGNOSIS — M17.0 PRIMARY OSTEOARTHRITIS OF BOTH KNEES: ICD-10-CM

## 2022-12-14 DIAGNOSIS — M25.561 CHRONIC PAIN OF BOTH KNEES: ICD-10-CM

## 2022-12-14 DIAGNOSIS — M25.562 CHRONIC PAIN OF BOTH KNEES: Primary | ICD-10-CM

## 2022-12-14 PROCEDURE — 3008F PR BODY MASS INDEX (BMI) DOCUMENTED: ICD-10-PCS | Mod: CPTII,S$GLB,, | Performed by: ORTHOPAEDIC SURGERY

## 2022-12-14 PROCEDURE — 99999 PR PBB SHADOW E&M-EST. PATIENT-LVL III: CPT | Mod: PBBFAC,,, | Performed by: ORTHOPAEDIC SURGERY

## 2022-12-14 PROCEDURE — 99213 PR OFFICE/OUTPT VISIT, EST, LEVL III, 20-29 MIN: ICD-10-PCS | Mod: S$GLB,,, | Performed by: ORTHOPAEDIC SURGERY

## 2022-12-14 PROCEDURE — 73564 X-RAY EXAM KNEE 4 OR MORE: CPT | Mod: TC,50,PN

## 2022-12-14 PROCEDURE — 73564 XR KNEE ORTHO BILAT WITH FLEXION: ICD-10-PCS | Mod: 26,50,, | Performed by: RADIOLOGY

## 2022-12-14 PROCEDURE — 99999 PR PBB SHADOW E&M-EST. PATIENT-LVL III: ICD-10-PCS | Mod: PBBFAC,,, | Performed by: ORTHOPAEDIC SURGERY

## 2022-12-14 PROCEDURE — 1159F PR MEDICATION LIST DOCUMENTED IN MEDICAL RECORD: ICD-10-PCS | Mod: CPTII,S$GLB,, | Performed by: ORTHOPAEDIC SURGERY

## 2022-12-14 PROCEDURE — 1159F MED LIST DOCD IN RCRD: CPT | Mod: CPTII,S$GLB,, | Performed by: ORTHOPAEDIC SURGERY

## 2022-12-14 PROCEDURE — 3008F BODY MASS INDEX DOCD: CPT | Mod: CPTII,S$GLB,, | Performed by: ORTHOPAEDIC SURGERY

## 2022-12-14 PROCEDURE — 73564 X-RAY EXAM KNEE 4 OR MORE: CPT | Mod: 26,50,, | Performed by: RADIOLOGY

## 2022-12-14 PROCEDURE — 99213 OFFICE O/P EST LOW 20 MIN: CPT | Mod: S$GLB,,, | Performed by: ORTHOPAEDIC SURGERY

## 2022-12-14 NOTE — PROGRESS NOTES
Patient ID: Nereida Narayanan is a 47 y.o. female    Chief Complaint:   Chief Complaint   Patient presents with    Left Knee - Pain    Right Knee - Pain       History of Present Illness:    Nereida Narayanan is a pleasant 47 y.o. female who presents for evaluation of bilateral knee pain, right worse than left. She  reports pain for the past several years. She does not endorse a history of trauma.  She has had a knee scope a few years back which showed grade 3 and 4 chondral changes.  Bending and straightening makes it worse and rest makes it better. The pain is mostly medial. She does endorse nighttime pain.   She is independent with all activities of daily living.     In the past she has tried the following treatments:    NSAIDS   Cortisone injection  Surgery right knee arthroscopy     She currently does work.       Instability: No  Mechanical Symptoms: Yes    Diabetic:  No  Smoker:  No        PAST MEDICAL HISTORY:   Past Medical History:   Diagnosis Date    Morbid obesity with BMI of 45.0-49.9, adult      PAST SURGICAL HISTORY:   Past Surgical History:   Procedure Laterality Date    ARTHROSCOPIC CHONDROPLASTY OF KNEE JOINT Right 2020    Procedure: ARTHROSCOPY, KNEE, WITH CHONDROPLASTY;  Surgeon: Aiden Clarke DO;  Location: [facility] OR;  Service: Orthopedics;  Laterality: Right;     SECTION      KNEE ARTHROSCOPY W/ MENISCECTOMY Right 2020    Procedure: ARTHROSCOPY, KNEE, WITH MENISCECTOMY;  Surgeon: Aiden Clarke DO;  Location: [facility] OR;  Service: Orthopedics;  Laterality: Right;  Equipment: Scope; [employer] Truspar Meniscus Repair System; [place] chondral drill set  Vendor: [employer]; [place] [employer]  Table: Supine    KNEE ARTHROSCOPY W/ PLICA EXCISION Right 2020    Procedure: EXCISION, PLICA, KNEE, ARTHROSCOPIC;  Surgeon: Aiden Clarke DO;  Location: [facility] OR;  Service: Orthopedics;  Laterality: Right;     FAMILY HISTORY:   Family History   Problem Relation Age of Onset    Hypertension Mother     Alcohol  abuse Father         none    Breast cancer Neg Hx     Ovarian cancer Neg Hx      SOCIAL HISTORY:   Social History     Occupational History    Not on file   Tobacco Use    Smoking status: Never    Smokeless tobacco: Never   Substance and Sexual Activity    Alcohol use: No    Drug use: No    Sexual activity: Yes     Partners: Male     Birth control/protection: None        MEDICATIONS:   Current Outpatient Medications:     furosemide (LASIX) 40 MG tablet, Take 1 tablet (40 mg total) by mouth once daily., Disp: 30 tablet, Rfl: 0    topiramate (TOPAMAX) 15 MG capsule, Take 1 capsule (15 mg total) by mouth once daily., Disp: 30 capsule, Rfl: 0  ALLERGIES:   Review of patient's allergies indicates:   Allergen Reactions    Adhesive      Causes skin tearing and redness          Physical Exam     Vitals:    12/14/22 1114   Resp: 16     Alert and oriented to person, place and time. No acute distress. Well-groomed, not ill appearing. Pupils round and reactive, normal respiratory effort, no audible wheezing.     GENERAL:  A well-developed, well-nourished 47 y.o. female who is alert and       oriented in no acute distress.      Gait: She  walks with a antalgic gait.                   EXTREMITIES:  Examination of lower extremities reveals there is no visible mass or deformity.    Left knee:  ROM 5-120    Ligamentously stable to varus/valgus stress.    Anterior and posterior drawers negative.    No pain over pes bursa.    No warmth    No erythema     Effusion Yes    medial joint line tenderness    Positive Patellofemoral grind/crepitus     Right knee:  ROM 5-120    Ligamentously stable to varus/valgus stress.    Anterior and posterior drawers negative.    No pain over pes bursa.    No warmth    No erythema    Effusion Yes    medial joint line tenderness    Positive Patellofemoral grind/crepitus     The skin over both lower extremities is normal and unremarkable.  She has a  painless range of motion of the hips and ankles  bilaterally.   Sensation is intact in both lower extremities.    There are no motor deficits in the lower extremities bilaterally.   Pedal pulses are palpable distally bilaterally.    She has no calf tenderness to palpation nor edema.       Imaging:     X-Ray: I have reviewed all pertinent results/findings and my personal findings are:  Bilateral osteoarthritis of the knees mostly in the medial compartment.  There is bilateral varus deformity.  There is subchondral sclerosis and osteophytes.      Assessment & Plan    Primary osteoarthritis of right knee    Primary osteoarthritis of both knees  -     Prior authorization Order         Treatment options discussed with her in detail.  She has bilateral knee arthritis which is severe.  She is quite young.  Her BMI is also elevated to 48.  She has failed conservative treatment with injections and arthroscopy of the right knee in the past.  We discussed options for her including viscosupplementation which she would like to try.    We have discussed a variety of treatment options including medications, injections, physical therapy and other alternative treatments. I also explained the indications, risks and benefits of surgery.  Patient's pain is refractory HEP, conservative management, and NSAIDs. Pt would like to proceed with physical therapy and visco-supplementation.    Medical Necessity for viscosupplementation use: After thorough evaluation of the patient, I have determined that viscosupplementation treatment is medically necessary. The patient has painful degenerative joint disease (DJD) of the knee(s) with failure of conservative treatments including lifestyle modifications and rehabilitation exercises. Oral analgesics including NSAIDs have not adequately controlled the patient's symptoms. There is radiographic evidence of Kellgren-Clay grade II (or greater) osteoarthritic (OA) changes, or if lack of radiographic evidence, there is arthroscopic or other evidence  of chondrosis of the knee(s).     I made the decision to obtain old records of the patient including previous notes and imaging. I independently reviewed and interpreted lab results today as well as prior imaging.      1. Pre-authorization placed for Monovisc injections.  2. Ice compress to the affected area 2-3x a day for 15-20 minutes as needed for pain management.  3. NSAIDs twice daily PRN for pain management.   4. Ambulatory referral to physical therapy for patellofemoral strength and conditioning protocol.   5. RTC to see me for Monovisc injections.    All of the patient's questions were answered and the patient will contact us if they have any questions or concerns in the interim.

## 2022-12-15 ENCOUNTER — PATIENT MESSAGE (OUTPATIENT)
Dept: ORTHOPEDICS | Facility: CLINIC | Age: 47
End: 2022-12-15
Payer: COMMERCIAL

## 2022-12-16 ENCOUNTER — TELEPHONE (OUTPATIENT)
Dept: ORTHOPEDICS | Facility: CLINIC | Age: 47
End: 2022-12-16
Payer: COMMERCIAL

## 2022-12-16 DIAGNOSIS — M17.0 PRIMARY OSTEOARTHRITIS OF BOTH KNEES: Primary | ICD-10-CM

## 2022-12-20 ENCOUNTER — TELEPHONE (OUTPATIENT)
Dept: ORTHOPEDICS | Facility: CLINIC | Age: 47
End: 2022-12-20
Payer: COMMERCIAL

## 2022-12-20 NOTE — TELEPHONE ENCOUNTER
Harriet Zelaya LPN   Sent: 12/20/2022  12:00 PM CST   To: Darcy Childress   Subject: RE: DENIAL                                       I'll call the patient then. I thought this was done by the rep as it stated in the original message.   ----- Message -----   From: Darcy Childress   Sent: 12/20/2022   9:56 AM CST   To: Harriet Zelaya LPN, Juan Dorsey, *   Subject: RE: DENIAL                                       I am not sure about that, This was the rep at her insurance company informed me yesterday, when I called about the injection coverage and benefits.   ----- Message -----   From: Harriet Zelaya LPN   Sent: 12/20/2022   9:43 AM CST   To: Darcy Childress   Subject: RE: DENIAL                                       I dont understand why she was checked in for her first appointment if she is out of network?   ----- Message -----   From: Darcy Childress   Sent: 12/20/2022   9:38 AM CST   To: Harriet Zelaya LPN   Subject: RE: DENIAL                                       No ma'am. Normally the office tell the patient but I can if you prefer.   ----- Message -----   From: Harriet Zelaya LPN   Sent: 12/20/2022   9:35 AM CST   To: Darcy Childress   Subject: RE: DENIAL                                       So pt has already been notified that we are not in network?   ----- Message -----   From: Darcy Childress   Sent: 12/19/2022   4:35 PM CST   To: [facility] Staff   Subject: DENIAL                                           Dear Doctor and Staff,       Was unable to submit this request due to have been informed by this pt's insurance [employer] , the physician is out of network with this pt's plan, Insurance rep advice pt call her plan to find MD. I can be reached at In Basket, if questions.     Thank you,   Darcy Childress   [employer] Precertification Nurse-LPN

## 2022-12-20 NOTE — TELEPHONE ENCOUNTER
Advised pt that we are not a covered provider. Pt advised to contact her insurance to see who they cover in the area and schedule an appt with them for further treatment. Explained that the HA injections are a covered benefit, but with Dr. Arnett not being a covered provider, the injections will not be covered.

## 2022-12-21 ENCOUNTER — TELEPHONE (OUTPATIENT)
Dept: ORTHOPEDICS | Facility: CLINIC | Age: 47
End: 2022-12-21
Payer: COMMERCIAL

## 2022-12-21 ENCOUNTER — TELEPHONE (OUTPATIENT)
Dept: FAMILY MEDICINE | Facility: CLINIC | Age: 47
End: 2022-12-21

## 2022-12-21 NOTE — TELEPHONE ENCOUNTER
----- Message from Carola Calix sent at 12/21/2022  2:55 PM CST -----  Type: Needs Medical Advice         Who Called:  [facility]  Best Call Back Number:.[phone]  Additional Information: Requesting a call back regarding  they are follow up on the status of the request for forms for the diabetic foot assessment that is  needed.They said they spoke with Aramis last week but have not received the forms back yet.   Please Advise- Thank you

## 2022-12-21 NOTE — TELEPHONE ENCOUNTER
Returned call. Pt's appt was cancelled due to her insurance denying auth for bilateral gel injections. Pt stated she will reach out to [facility] to see if this is a service financial aid can assist with. Pt advised to follow up with us on the outcome so that she can be re-scheduled accordingly.

## 2022-12-22 ENCOUNTER — PATIENT MESSAGE (OUTPATIENT)
Dept: FAMILY MEDICINE | Facility: CLINIC | Age: 47
End: 2022-12-22
Payer: COMMERCIAL

## 2022-12-22 NOTE — TELEPHONE ENCOUNTER
Hello,     I am unsure if this was suppose to come to Dr. Rishi miramontes, as Dr. Blackwell is no longer practicing at this time. We do not have a Aramis within our office/    Thanks  Louise LANGLEY LPN

## 2022-12-23 ENCOUNTER — PATIENT MESSAGE (OUTPATIENT)
Dept: ORTHOPEDICS | Facility: CLINIC | Age: 47
End: 2022-12-23
Payer: COMMERCIAL

## 2022-12-30 ENCOUNTER — OFFICE VISIT (OUTPATIENT)
Dept: FAMILY MEDICINE | Facility: CLINIC | Age: 47
End: 2022-12-30
Payer: COMMERCIAL

## 2022-12-30 VITALS
DIASTOLIC BLOOD PRESSURE: 80 MMHG | SYSTOLIC BLOOD PRESSURE: 130 MMHG | TEMPERATURE: 97 F | HEART RATE: 78 BPM | HEIGHT: 59 IN | OXYGEN SATURATION: 99 % | WEIGHT: 236 LBS | BODY MASS INDEX: 47.58 KG/M2

## 2022-12-30 DIAGNOSIS — M17.0 OSTEOARTHRITIS OF BOTH KNEES, UNSPECIFIED OSTEOARTHRITIS TYPE: Primary | ICD-10-CM

## 2022-12-30 DIAGNOSIS — Z12.31 ENCOUNTER FOR SCREENING MAMMOGRAM FOR MALIGNANT NEOPLASM OF BREAST: ICD-10-CM

## 2022-12-30 DIAGNOSIS — E66.01 CLASS 3 SEVERE OBESITY DUE TO EXCESS CALORIES WITHOUT SERIOUS COMORBIDITY WITH BODY MASS INDEX (BMI) OF 45.0 TO 49.9 IN ADULT: ICD-10-CM

## 2022-12-30 PROCEDURE — 3079F DIAST BP 80-89 MM HG: CPT | Mod: CPTII,S$GLB,,

## 2022-12-30 PROCEDURE — 3075F SYST BP GE 130 - 139MM HG: CPT | Mod: CPTII,S$GLB,,

## 2022-12-30 PROCEDURE — 99214 OFFICE O/P EST MOD 30 MIN: CPT | Mod: S$GLB,,,

## 2022-12-30 PROCEDURE — 1160F RVW MEDS BY RX/DR IN RCRD: CPT | Mod: CPTII,S$GLB,,

## 2022-12-30 PROCEDURE — 3008F PR BODY MASS INDEX (BMI) DOCUMENTED: ICD-10-PCS | Mod: CPTII,S$GLB,,

## 2022-12-30 PROCEDURE — 3008F BODY MASS INDEX DOCD: CPT | Mod: CPTII,S$GLB,,

## 2022-12-30 PROCEDURE — 3079F PR MOST RECENT DIASTOLIC BLOOD PRESSURE 80-89 MM HG: ICD-10-PCS | Mod: CPTII,S$GLB,,

## 2022-12-30 PROCEDURE — 99214 PR OFFICE/OUTPT VISIT, EST, LEVL IV, 30-39 MIN: ICD-10-PCS | Mod: S$GLB,,,

## 2022-12-30 PROCEDURE — 3075F PR MOST RECENT SYSTOLIC BLOOD PRESS GE 130-139MM HG: ICD-10-PCS | Mod: CPTII,S$GLB,,

## 2022-12-30 PROCEDURE — 1159F PR MEDICATION LIST DOCUMENTED IN MEDICAL RECORD: ICD-10-PCS | Mod: CPTII,S$GLB,,

## 2022-12-30 PROCEDURE — 1159F MED LIST DOCD IN RCRD: CPT | Mod: CPTII,S$GLB,,

## 2022-12-30 PROCEDURE — 1160F PR REVIEW ALL MEDS BY PRESCRIBER/CLIN PHARMACIST DOCUMENTED: ICD-10-PCS | Mod: CPTII,S$GLB,,

## 2022-12-30 RX ORDER — METHYLPREDNISOLONE 4 MG/1
TABLET ORAL
Qty: 21 EACH | Refills: 0 | Status: SHIPPED | OUTPATIENT
Start: 2022-12-30 | End: 2023-01-06

## 2022-12-30 NOTE — PROGRESS NOTES
Subjective:       Patient ID: Nereida Narayanan is a 47 y.o. female.    Chief Complaint: Knee Pain (Both , right is worse)    Patient presents to clinic requesting a steroid for knee pain and to discuss getting a prescription of adipex for weight loss.  She was recently seen by orthopedics and diagnosed with arthritis in both knees.  It was recommended she have Monovisc injections but her insurance would not cover the injections with the provider she saw.  [place] her feet for several hours per day.  This causes her to have significant pain in her knees.  She is trying to find a doctor in [place] to see her that is in network with her insurance. She was also referred to physical therapy but has not scheduled this at this time.  She is requesting to have steroids to help with pain.  She is also wanting to lose weight and would like to try adipex.            Review of patient's allergies indicates:   Allergen Reactions    Adhesive      Causes skin tearing and redness      Social Determinants of Health     Tobacco Use: Low Risk     Smoking Tobacco Use: Never    Smokeless Tobacco Use: Never    Passive Exposure: Not on file   Alcohol Use: Not At Risk    Frequency of Alcohol Consumption: Monthly or less    Average Number of Drinks: 1 or 2    Frequency of Binge Drinking: Never   Financial Resource Strain: Low Risk     Difficulty of Paying Living Expenses: Not very hard   Food Insecurity: No Food Insecurity    Worried About Running Out of Food in the Last Year: Never true    Ran Out of Food in the Last Year: Never true   Transportation Needs: No Transportation Needs    Lack of Transportation (Medical): No    Lack of Transportation (Non-Medical): No   Physical Activity: Sufficiently Active    Days of Exercise per Week: 5 days    Minutes of Exercise per Session: 30 min   Stress: No Stress Concern Present    Feeling of Stress : Only a little   Social Connections: Unknown    Frequency of  Communication with Friends and Family: Three times a week    Frequency of Social Gatherings with Friends and Family: Once a week    Attends Restoration Services: Not on file    Active Member of Clubs or Organizations: No    Attends Club or Organization Meetings: Never    Marital Status: Never    Housing Stability: Low Risk     Unable to Pay for Housing in the Last Year: No    Number of Places Lived in the Last Year: 1    Unstable Housing in the Last Year: No   Depression: Low Risk     Last PHQ Score: 0      Past Medical History:   Diagnosis Date    Morbid obesity with BMI of 45.0-49.9, adult       Past Surgical History:   Procedure Laterality Date    ARTHROSCOPIC CHONDROPLASTY OF KNEE JOINT Right 2020    Procedure: ARTHROSCOPY, KNEE, WITH CHONDROPLASTY;  Surgeon: Aiden Clarke DO;  Location: [facility] OR;  Service: Orthopedics;  Laterality: Right;     SECTION      KNEE ARTHROSCOPY W/ MENISCECTOMY Right 2020    Procedure: ARTHROSCOPY, KNEE, WITH MENISCECTOMY;  Surgeon: Aiden Clarke DO;  Location: [facility] OR;  Service: Orthopedics;  Laterality: Right;  Equipment: Scope; [employer] Meniscus Repair System; [employer] chondral drill set  Vendor: [employer]; [employer]  Table: Supine    KNEE ARTHROSCOPY W/ PLICA EXCISION Right 2020    Procedure: EXCISION, PLICA, KNEE, ARTHROSCOPIC;  Surgeon: Aiden Clarke DO;  Location: [facility] OR;  Service: Orthopedics;  Laterality: Right;      Social History     Socioeconomic History    Marital status: Single         Current Outpatient Medications:     furosemide (LASIX) 40 MG tablet, Take 1 tablet (40 mg total) by mouth once daily., Disp: 30 tablet, Rfl: 0    topiramate (TOPAMAX) 15 MG capsule, Take 1 capsule (15 mg total) by mouth once daily., Disp: 30 capsule, Rfl: 0    methylPREDNISolone (MEDROL DOSEPACK) 4 mg tablet, use as directed, Disp: 21 each, Rfl: 0    Lab Results   Component Value Date    WBC 7.37 2022    HGB 11.0 (L) 2022    HCT 34.4 (L)  05/13/2022     05/13/2022    CHOL 213 (H) 11/10/2021    TRIG 146 11/10/2021    HDL 47 11/10/2021    ALT 38 05/13/2022    AST 29 05/13/2022     05/13/2022    K 4.2 05/13/2022     05/13/2022    CREATININE 0.6 05/13/2022    BUN 13 05/13/2022    CO2 22 (L) 05/13/2022    TSH 3.120 07/09/2020    INR 1.1 09/03/2020       Review of Systems   Constitutional:  Negative for chills and fever.   Respiratory: Negative.     Cardiovascular: Negative.    Musculoskeletal:  Positive for arthralgias.     Objective:      Physical Exam  Vitals reviewed.   Constitutional:       Appearance: Normal appearance.   Cardiovascular:      Rate and Rhythm: Normal rate and regular rhythm.      Pulses: Normal pulses.      Heart sounds: Normal heart sounds, S1 normal and S2 normal.   Pulmonary:      Effort: Pulmonary effort is normal.      Breath sounds: Normal breath sounds.   Musculoskeletal:      Right knee: No swelling, effusion or crepitus. Normal range of motion.      Left knee: No swelling, effusion or crepitus. Normal range of motion.   Neurological:      Mental Status: She is alert.       Assessment:       1. Osteoarthritis of both knees, unspecified osteoarthritis type    2. Class 3 severe obesity due to excess calories without serious comorbidity with body mass index (BMI) of 45.0 to 49.9 in adult    3. Encounter for screening mammogram for malignant neoplasm of breast          Plan:       Nereida was seen today for knee pain.    Diagnoses and all orders for this visit:    Osteoarthritis of both knees, unspecified osteoarthritis type  -     methylPREDNISolone (MEDROL DOSEPACK) 4 mg tablet; use as directed    Class 3 severe obesity due to excess calories without serious comorbidity with body mass index (BMI) of 45.0 to 49.9 in adult    Encounter for screening mammogram for malignant neoplasm of breast  -     Mammo Digital Screening Bilat w/ [facility]; Future     Medrol dosepack prescribed for bilateral knee pain.  Patient  advises she may have minimal relief. I encouraged her to schedule appointment with an in network Orthopedic doctor for Monovisc injections. She was also encouraged to follow through with Physical therapy.    We discussed weight and safe, effective ways of losing pounds, including low carbohydrate, low fat diet and increasing physical activity to improve cardiovascular performance and increase metabolism.  Patient was encouraged to set realistic attainable goals for weight loss.  She was advised that I cannot prescribe adipex and could schedule her with Dr. Valadez if she would like to pursue.    She does not have a primary care provider and would like to reschedule a new patient appointment to establish care.  She is also requesting order form mammogram

## 2023-01-05 ENCOUNTER — PATIENT MESSAGE (OUTPATIENT)
Dept: FAMILY MEDICINE | Facility: CLINIC | Age: 48
End: 2023-01-05

## 2023-01-05 ENCOUNTER — OFFICE VISIT (OUTPATIENT)
Dept: FAMILY MEDICINE | Facility: CLINIC | Age: 48
End: 2023-01-05
Payer: COMMERCIAL

## 2023-01-05 VITALS
SYSTOLIC BLOOD PRESSURE: 140 MMHG | BODY MASS INDEX: 46.97 KG/M2 | OXYGEN SATURATION: 97 % | RESPIRATION RATE: 18 BRPM | HEIGHT: 59 IN | HEART RATE: 92 BPM | TEMPERATURE: 98 F | WEIGHT: 233 LBS | DIASTOLIC BLOOD PRESSURE: 70 MMHG

## 2023-01-05 DIAGNOSIS — R10.9 ABDOMINAL PAIN, UNSPECIFIED ABDOMINAL LOCATION: Primary | ICD-10-CM

## 2023-01-05 DIAGNOSIS — E66.01 MORBID OBESITY: ICD-10-CM

## 2023-01-05 DIAGNOSIS — R73.03 PREDIABETES: ICD-10-CM

## 2023-01-05 DIAGNOSIS — R10.32 LEFT LOWER QUADRANT ABDOMINAL PAIN: ICD-10-CM

## 2023-01-05 LAB
BILIRUBIN, UA POC OHS: NEGATIVE
BLOOD, UA POC OHS: NEGATIVE
CLARITY, UA POC OHS: CLEAR
COLOR, UA POC OHS: YELLOW
GLUCOSE, UA POC OHS: NEGATIVE
KETONES, UA POC OHS: NEGATIVE
LEUKOCYTES, UA POC OHS: NEGATIVE
NITRITE, UA POC OHS: NEGATIVE
PH, UA POC OHS: 5.5
PROTEIN, UA POC OHS: NEGATIVE
SPECIFIC GRAVITY, UA POC OHS: >=1.03
UROBILINOGEN, UA POC OHS: 0.2

## 2023-01-05 PROCEDURE — 3077F SYST BP >= 140 MM HG: CPT | Mod: CPTII,S$GLB,, | Performed by: FAMILY MEDICINE

## 2023-01-05 PROCEDURE — 99214 PR OFFICE/OUTPT VISIT, EST, LEVL IV, 30-39 MIN: ICD-10-PCS | Mod: S$GLB,,, | Performed by: FAMILY MEDICINE

## 2023-01-05 PROCEDURE — 1160F RVW MEDS BY RX/DR IN RCRD: CPT | Mod: CPTII,S$GLB,, | Performed by: FAMILY MEDICINE

## 2023-01-05 PROCEDURE — 99214 OFFICE O/P EST MOD 30 MIN: CPT | Mod: S$GLB,,, | Performed by: FAMILY MEDICINE

## 2023-01-05 PROCEDURE — 3078F PR MOST RECENT DIASTOLIC BLOOD PRESSURE < 80 MM HG: ICD-10-PCS | Mod: CPTII,S$GLB,, | Performed by: FAMILY MEDICINE

## 2023-01-05 PROCEDURE — 3008F PR BODY MASS INDEX (BMI) DOCUMENTED: ICD-10-PCS | Mod: CPTII,S$GLB,, | Performed by: FAMILY MEDICINE

## 2023-01-05 PROCEDURE — 3078F DIAST BP <80 MM HG: CPT | Mod: CPTII,S$GLB,, | Performed by: FAMILY MEDICINE

## 2023-01-05 PROCEDURE — 1159F PR MEDICATION LIST DOCUMENTED IN MEDICAL RECORD: ICD-10-PCS | Mod: CPTII,S$GLB,, | Performed by: FAMILY MEDICINE

## 2023-01-05 PROCEDURE — 1160F PR REVIEW ALL MEDS BY PRESCRIBER/CLIN PHARMACIST DOCUMENTED: ICD-10-PCS | Mod: CPTII,S$GLB,, | Performed by: FAMILY MEDICINE

## 2023-01-05 PROCEDURE — 3008F BODY MASS INDEX DOCD: CPT | Mod: CPTII,S$GLB,, | Performed by: FAMILY MEDICINE

## 2023-01-05 PROCEDURE — 81003 POCT URINALYSIS(INSTRUMENT): ICD-10-PCS | Mod: QW,S$GLB,, | Performed by: FAMILY MEDICINE

## 2023-01-05 PROCEDURE — 81003 URINALYSIS AUTO W/O SCOPE: CPT | Mod: QW,S$GLB,, | Performed by: FAMILY MEDICINE

## 2023-01-05 PROCEDURE — 3077F PR MOST RECENT SYSTOLIC BLOOD PRESSURE >= 140 MM HG: ICD-10-PCS | Mod: CPTII,S$GLB,, | Performed by: FAMILY MEDICINE

## 2023-01-05 PROCEDURE — 1159F MED LIST DOCD IN RCRD: CPT | Mod: CPTII,S$GLB,, | Performed by: FAMILY MEDICINE

## 2023-01-05 RX ORDER — PHENTERMINE HYDROCHLORIDE 37.5 MG/1
37.5 TABLET ORAL
COMMUNITY
End: 2023-01-05 | Stop reason: SDUPTHER

## 2023-01-05 RX ORDER — METRONIDAZOLE 250 MG/1
500 TABLET ORAL EVERY 6 HOURS
COMMUNITY
End: 2023-01-05 | Stop reason: SDUPTHER

## 2023-01-06 RX ORDER — METRONIDAZOLE 250 MG/1
250 TABLET ORAL 3 TIMES DAILY
Qty: 30 TABLET | Refills: 0 | Status: SHIPPED | OUTPATIENT
Start: 2023-01-06 | End: 2023-07-28 | Stop reason: ALTCHOICE

## 2023-01-06 RX ORDER — AMOXICILLIN AND CLAVULANATE POTASSIUM 875; 125 MG/1; MG/1
1 TABLET, FILM COATED ORAL 2 TIMES DAILY
Qty: 20 TABLET | Refills: 0 | Status: SHIPPED | OUTPATIENT
Start: 2023-01-06 | End: 2023-02-09

## 2023-01-06 RX ORDER — PHENTERMINE HYDROCHLORIDE 37.5 MG/1
37.5 TABLET ORAL
Qty: 30 TABLET | Refills: 0 | Status: SHIPPED | OUTPATIENT
Start: 2023-01-06 | End: 2023-07-28

## 2023-01-07 NOTE — PROGRESS NOTES
Subjective:       Patient ID: Nereida Narayanan is a 47 y.o. female.    Chief Complaint: No chief complaint on file.    Referred from nurse practitioner to explore getting Adipex P.  Has taken it previously.  BMI is 47.  Morbid obesity.  Used Adipex in the past from  In [place].  Only dropped about 15 lb.  But regained it.  TSH is okay.  This was done in September.  Cycles are okay.  Last cycle just ended.  Cardiovascular no chest pain no palpitations no PND orthopnea.  Takes Tylenol No. 3 1 per day from a pain clinic.  Ibuprofen and a leave p.r.n..  Fasting glucose 91.  She is also having some abdominal pain lower abdomen started yesterday.  No dysuria frequency urgency.  No nausea vomiting or diarrhea no fever chills she is not had a colonoscopy yet.  Urinalysis is within normal limits.      Physical examination.  Vital signs are noted.  Neck without bruit.  Chest clear.  Heart regular rate rhythm.  Abdomen bowel sounds are positive soft very slightly tender lower left and right abdomen no guarding no rebound.      Objective:        Assessment:       1. Abdominal pain, unspecified abdominal location    2. BMI 45.0-49.9, adult    3. Morbid obesity    4. Prediabetes    5. Left lower quadrant abdominal pain          Plan:       Abdominal pain, unspecified abdominal location  -     POCT Urinalysis(Instrument)  -     CBC Auto Differential; Future; Expected date: 01/05/2023  -     Comprehensive Metabolic Panel; Future; Expected date: 01/05/2023  -     CT Abdomen Pelvis With Contrast; Future; Expected date: 01/05/2023    BMI 45.0-49.9, adult    Morbid obesity    Prediabetes  -     Hemoglobin A1C; Future; Expected date: 01/05/2023    Left lower quadrant abdominal pain  -     CT Abdomen Pelvis With Contrast; Future; Expected date: 01/05/2023    Other orders  -     phentermine (ADIPEX-P) 37.5 mg tablet; Take 1 tablet (37.5 mg total) by mouth before breakfast.  Dispense: 30 tablet; Refill: 0  -      amoxicillin-clavulanate 875-125mg (AUGMENTIN) 875-125 mg per tablet; Take 1 tablet by mouth 2 (two) times a day.  Dispense: 20 tablet; Refill: 0  -     metroNIDAZOLE (FLAGYL) 250 MG tablet; Take 1 tablet (250 mg total) by mouth 3 (three) times daily.  Dispense: 30 tablet; Refill: 0      Will check hemoglobin A1c.  She may have Adipex P 30 7.5 mg 1 prescription for 30 follow-up in 1 month.  Low-fat diet discussed in detail.  Check CBC and CMP.  Get a CT of the abdomen pelvis with contrast.  Follow-up in 2 weeks regarding the abdominal pain.  Augmentin 875 b.i.d. for 10 days and Flagyl 250 t.i.d. for 10 days.  Will need to have the colonoscopy done also.  Check blood pressure at follow-up.

## 2023-01-09 ENCOUNTER — PATIENT MESSAGE (OUTPATIENT)
Dept: ADMINISTRATIVE | Facility: HOSPITAL | Age: 48
End: 2023-01-09
Payer: COMMERCIAL

## 2023-01-09 ENCOUNTER — PATIENT MESSAGE (OUTPATIENT)
Dept: FAMILY MEDICINE | Facility: CLINIC | Age: 48
End: 2023-01-09
Payer: COMMERCIAL

## 2023-01-09 DIAGNOSIS — R60.0 BILATERAL LOWER EXTREMITY EDEMA: ICD-10-CM

## 2023-01-09 RX ORDER — PHENTERMINE HYDROCHLORIDE 37.5 MG/1
37.5 TABLET ORAL
Qty: 30 TABLET | Refills: 0 | OUTPATIENT
Start: 2023-01-09

## 2023-01-09 RX ORDER — METRONIDAZOLE 250 MG/1
250 TABLET ORAL 3 TIMES DAILY
Qty: 30 TABLET | Refills: 0 | OUTPATIENT
Start: 2023-01-09

## 2023-01-09 RX ORDER — AMOXICILLIN AND CLAVULANATE POTASSIUM 875; 125 MG/1; MG/1
1 TABLET, FILM COATED ORAL 2 TIMES DAILY
Qty: 20 TABLET | Refills: 0 | OUTPATIENT
Start: 2023-01-09

## 2023-01-09 NOTE — TELEPHONE ENCOUNTER
Pt changing pharm. Was shortage on antibiotics   Called and confirmed that she hasnt picked up adipex.

## 2023-01-23 ENCOUNTER — PATIENT MESSAGE (OUTPATIENT)
Dept: ORTHOPEDICS | Facility: CLINIC | Age: 48
End: 2023-01-23
Payer: COMMERCIAL

## 2023-01-24 ENCOUNTER — PATIENT MESSAGE (OUTPATIENT)
Dept: ORTHOPEDICS | Facility: CLINIC | Age: 48
End: 2023-01-24
Payer: COMMERCIAL

## 2023-01-24 ENCOUNTER — OFFICE VISIT (OUTPATIENT)
Dept: ORTHOPEDICS | Facility: CLINIC | Age: 48
End: 2023-01-24
Payer: COMMERCIAL

## 2023-01-24 ENCOUNTER — PATIENT MESSAGE (OUTPATIENT)
Dept: ORTHOPEDICS | Facility: CLINIC | Age: 48
End: 2023-01-24

## 2023-01-24 VITALS — BODY MASS INDEX: 46.97 KG/M2 | RESPIRATION RATE: 18 BRPM | WEIGHT: 233 LBS | HEIGHT: 59 IN

## 2023-01-24 DIAGNOSIS — M17.0 PRIMARY OSTEOARTHRITIS OF BOTH KNEES: Primary | ICD-10-CM

## 2023-01-24 PROCEDURE — 3008F PR BODY MASS INDEX (BMI) DOCUMENTED: ICD-10-PCS | Mod: CPTII,S$GLB,, | Performed by: ORTHOPAEDIC SURGERY

## 2023-01-24 PROCEDURE — 3008F BODY MASS INDEX DOCD: CPT | Mod: CPTII,S$GLB,, | Performed by: ORTHOPAEDIC SURGERY

## 2023-01-24 PROCEDURE — 99999 PR PBB SHADOW E&M-EST. PATIENT-LVL II: ICD-10-PCS | Mod: PBBFAC,,, | Performed by: ORTHOPAEDIC SURGERY

## 2023-01-24 PROCEDURE — 99999 PR PBB SHADOW E&M-EST. PATIENT-LVL II: CPT | Mod: PBBFAC,,, | Performed by: ORTHOPAEDIC SURGERY

## 2023-01-24 PROCEDURE — 99214 PR OFFICE/OUTPT VISIT, EST, LEVL IV, 30-39 MIN: ICD-10-PCS | Mod: S$GLB,,, | Performed by: ORTHOPAEDIC SURGERY

## 2023-01-24 PROCEDURE — 99214 OFFICE O/P EST MOD 30 MIN: CPT | Mod: S$GLB,,, | Performed by: ORTHOPAEDIC SURGERY

## 2023-01-24 NOTE — PROGRESS NOTES
Patient ID: Nereida Narayanan is a 47 y.o. female    Chief Complaint:   Chief Complaint   Patient presents with    Right Knee - Pain    Left Knee - Pain       History of Present Illness:    Nereida Narayanan is a pleasant 47 y.o. female who presents for evaluation of bilateral knee pain, right worse than left. She  reports pain for the past several years. She does not endorse a history of trauma.  She has had a knee scope a few years back which showed grade 3 and 4 chondral changes.  Bending and straightening makes it worse and rest makes it better. The pain is mostly medial. She does endorse nighttime pain.   She is independent with all activities of daily living.     In the past she has tried the following treatments:    NSAIDS   Cortisone injection  Surgery right knee arthroscopy     She currently does work.       Instability: No  Mechanical Symptoms: Yes    Diabetic:  No  Smoker:  No  ____________________________________________________________________    Interval history 2023 : Patient returns today for follow up of her bilateral knees.  Known by me for severe osteoarthritis of the bilateral knees.  At her last visit we discussed viscosupplementation injections as she has failed steroid injections in the past.  There was an issue with her insurance.  She has since gotten new insurance and inquiring about approval for injections.  Today complains of bilateral knee pain, left worse than right.  Complains of mechanical symptoms as well as radiating pain down to her ankle on the left.      PAST MEDICAL HISTORY:   Past Medical History:   Diagnosis Date    Morbid obesity with BMI of 45.0-49.9, adult      PAST SURGICAL HISTORY:   Past Surgical History:   Procedure Laterality Date    ARTHROSCOPIC CHONDROPLASTY OF KNEE JOINT Right 2020    Procedure: ARTHROSCOPY, KNEE, WITH CHONDROPLASTY;  Surgeon: Aiden Clarke DO;  Location: [facility] OR;  Service: Orthopedics;  Laterality: Right;     SECTION      KNEE  ARTHROSCOPY W/ MENISCECTOMY Right 9/9/2020    Procedure: ARTHROSCOPY, KNEE, WITH MENISCECTOMY;  Surgeon: Aiden Clarke DO;  Location: [facility] OR;  Service: Orthopedics;  Laterality: Right;  Equipment: Scope; [employer] Meniscus Repair System; [place] chondral drill set  Vendor: [employer]; [place]  Table: Supine    KNEE ARTHROSCOPY W/ PLICA EXCISION Right 9/9/2020    Procedure: EXCISION, PLICA, KNEE, ARTHROSCOPIC;  Surgeon: Aiden Clarke DO;  Location: [facility] OR;  Service: Orthopedics;  Laterality: Right;     FAMILY HISTORY:   Family History   Problem Relation Age of Onset    Hypertension Mother     Alcohol abuse Father         none    Breast cancer Neg Hx     Ovarian cancer Neg Hx      SOCIAL HISTORY:   Social History     Occupational History    Not on file   Tobacco Use    Smoking status: Never    Smokeless tobacco: Never   Substance and Sexual Activity    Alcohol use: No    Drug use: No    Sexual activity: Yes     Partners: Male     Birth control/protection: None        MEDICATIONS:   Current Outpatient Medications:     amoxicillin-clavulanate 875-125mg (AUGMENTIN) 875-125 mg per tablet, Take 1 tablet by mouth 2 (two) times a day., Disp: 20 tablet, Rfl: 0    furosemide (LASIX) 40 MG tablet, Take 1 tablet (40 mg total) by mouth once daily., Disp: 30 tablet, Rfl: 0    metroNIDAZOLE (FLAGYL) 250 MG tablet, Take 1 tablet (250 mg total) by mouth 3 (three) times daily., Disp: 30 tablet, Rfl: 0    phentermine (ADIPEX-P) 37.5 mg tablet, Take 1 tablet (37.5 mg total) by mouth before breakfast., Disp: 30 tablet, Rfl: 0    topiramate (TOPAMAX) 15 MG capsule, Take 1 capsule (15 mg total) by mouth once daily., Disp: 30 capsule, Rfl: 0  ALLERGIES:   Review of patient's allergies indicates:   Allergen Reactions    Adhesive      Causes skin tearing and redness          Physical Exam     Vitals:    01/24/23 1402   Resp: 18     Alert and oriented to person, place and time. No acute distress. Well-groomed, not ill appearing.  Pupils round and reactive, normal respiratory effort, no audible wheezing.     GENERAL:  A well-developed, well-nourished 47 y.o. female who is alert and       oriented in no acute distress.      Gait: She  walks with a antalgic gait.                   EXTREMITIES:  Examination of lower extremities reveals there is no visible mass or deformity.    Left knee:  ROM 5-120    Ligamentously stable to varus/valgus stress.    Anterior and posterior drawers negative.    No pain over pes bursa.    No warmth    No erythema     Effusion Yes    medial joint line tenderness    Positive Patellofemoral grind/crepitus     Right knee:  ROM 5-120    Ligamentously stable to varus/valgus stress.    Anterior and posterior drawers negative.    No pain over pes bursa.    No warmth    No erythema    Effusion Yes    medial joint line tenderness    Positive Patellofemoral grind/crepitus     The skin over both lower extremities is normal and unremarkable.  She has a  painless range of motion of the hips and ankles bilaterally.   Sensation is intact in both lower extremities.    There are no motor deficits in the lower extremities bilaterally.   Pedal pulses are palpable distally bilaterally.    She has no calf tenderness to palpation nor edema.       Imaging:     X-Ray: I have reviewed all pertinent results/findings and my personal findings are:  Bilateral osteoarthritis of the knees mostly in the medial compartment.  There is bilateral varus deformity.  There is subchondral sclerosis and osteophytes.      Assessment & Plan    Primary osteoarthritis of both knees  -     Prior authorization Order         Treatment options discussed with her in detail.  She has bilateral knee arthritis which is severe.  She is quite young.  Her BMI is also elevated to 47.  She has failed conservative treatment with injections and arthroscopy of the right knee in the past.  We discussed options for her including viscosupplementation which she would like to  try.    We have discussed a variety of treatment options including medications, injections, physical therapy and other alternative treatments. I also explained the indications, risks and benefits of surgery.  Patient's pain is refractory HEP, conservative management, and NSAIDs. Pt would like to proceed with physical therapy and visco-supplementation.    Medical Necessity for viscosupplementation use: After thorough evaluation of the patient, I have determined that viscosupplementation treatment is medically necessary. The patient has painful degenerative joint disease (DJD) of the knee(s) with failure of conservative treatments including lifestyle modifications and rehabilitation exercises. Oral analgesics including NSAIDs have not adequately controlled the patient's symptoms. There is radiographic evidence of Kellgren-Clay grade II (or greater) osteoarthritic (OA) changes, or if lack of radiographic evidence, there is arthroscopic or other evidence of chondrosis of the knee(s).     I made the decision to obtain old records of the patient including previous notes and imaging. I independently reviewed and interpreted lab results today as well as prior imaging.      1. Pre-authorization placed for Synvisc 1 injections.  2. Ice compress to the affected area 2-3x a day for 15-20 minutes as needed for pain management.  3. NSAIDs twice daily PRN for pain management.   4. Ambulatory referral to physical therapy for patellofemoral strength and conditioning protocol.   5. RTC to see me for Synvisc-One injections.    All of the patient's questions were answered and the patient will contact us if they have any questions or concerns in the interim.

## 2023-01-26 ENCOUNTER — OFFICE VISIT (OUTPATIENT)
Dept: PODIATRY | Facility: CLINIC | Age: 48
End: 2023-01-26
Payer: COMMERCIAL

## 2023-01-26 VITALS
HEART RATE: 85 BPM | SYSTOLIC BLOOD PRESSURE: 140 MMHG | HEIGHT: 59 IN | WEIGHT: 229.38 LBS | DIASTOLIC BLOOD PRESSURE: 83 MMHG | BODY MASS INDEX: 46.24 KG/M2

## 2023-01-26 DIAGNOSIS — M19.079 OSTEOARTHRITIS OF ANKLE AND FOOT, UNSPECIFIED LATERALITY: Primary | ICD-10-CM

## 2023-01-26 DIAGNOSIS — M79.672 LEFT FOOT PAIN: ICD-10-CM

## 2023-01-26 PROCEDURE — 3008F BODY MASS INDEX DOCD: CPT | Mod: S$GLB,,, | Performed by: PODIATRIST

## 2023-01-26 PROCEDURE — 3079F DIAST BP 80-89 MM HG: CPT | Mod: S$GLB,,, | Performed by: PODIATRIST

## 2023-01-26 PROCEDURE — 1160F RVW MEDS BY RX/DR IN RCRD: CPT | Mod: S$GLB,,, | Performed by: PODIATRIST

## 2023-01-26 PROCEDURE — 1159F MED LIST DOCD IN RCRD: CPT | Mod: S$GLB,,, | Performed by: PODIATRIST

## 2023-01-26 PROCEDURE — 1160F PR REVIEW ALL MEDS BY PRESCRIBER/CLIN PHARMACIST DOCUMENTED: ICD-10-PCS | Mod: S$GLB,,, | Performed by: PODIATRIST

## 2023-01-26 PROCEDURE — 3077F SYST BP >= 140 MM HG: CPT | Mod: S$GLB,,, | Performed by: PODIATRIST

## 2023-01-26 PROCEDURE — 3077F PR MOST RECENT SYSTOLIC BLOOD PRESSURE >= 140 MM HG: ICD-10-PCS | Mod: S$GLB,,, | Performed by: PODIATRIST

## 2023-01-26 PROCEDURE — 99999 PR PBB SHADOW E&M-EST. PATIENT-LVL III: ICD-10-PCS | Mod: PBBFAC,,, | Performed by: PODIATRIST

## 2023-01-26 PROCEDURE — 99214 PR OFFICE/OUTPT VISIT, EST, LEVL IV, 30-39 MIN: ICD-10-PCS | Mod: S$GLB,,, | Performed by: PODIATRIST

## 2023-01-26 PROCEDURE — 99214 OFFICE O/P EST MOD 30 MIN: CPT | Mod: S$GLB,,, | Performed by: PODIATRIST

## 2023-01-26 PROCEDURE — 1159F PR MEDICATION LIST DOCUMENTED IN MEDICAL RECORD: ICD-10-PCS | Mod: S$GLB,,, | Performed by: PODIATRIST

## 2023-01-26 PROCEDURE — 3008F PR BODY MASS INDEX (BMI) DOCUMENTED: ICD-10-PCS | Mod: S$GLB,,, | Performed by: PODIATRIST

## 2023-01-26 PROCEDURE — 99999 PR PBB SHADOW E&M-EST. PATIENT-LVL III: CPT | Mod: PBBFAC,,, | Performed by: PODIATRIST

## 2023-01-26 PROCEDURE — 3079F PR MOST RECENT DIASTOLIC BLOOD PRESSURE 80-89 MM HG: ICD-10-PCS | Mod: S$GLB,,, | Performed by: PODIATRIST

## 2023-01-26 RX ORDER — DICLOFENAC SODIUM 75 MG/1
75 TABLET, DELAYED RELEASE ORAL 2 TIMES DAILY
Qty: 60 TABLET | Refills: 0 | Status: SHIPPED | OUTPATIENT
Start: 2023-01-26 | End: 2023-02-25

## 2023-01-29 PROBLEM — M79.672 LEFT FOOT PAIN: Status: ACTIVE | Noted: 2023-01-29

## 2023-01-29 NOTE — PROGRESS NOTES
Subjective:       Patient ID: Nereida Narayanan is a 47 y.o. female.    Chief Complaint: Foot Pain (Left foot )    Patient presents today she is complaining of left foot pain and swelling she relates no trauma or injury to the area she states that it has gotten progressively worse over the past 4-5 months.  Patient relates the last steroid shot she got helped her for about 2 months.    Past Medical History:   Diagnosis Date    Morbid obesity with BMI of 45.0-49.9, adult      Past Surgical History:   Procedure Laterality Date    ARTHROSCOPIC CHONDROPLASTY OF KNEE JOINT Right 2020    Procedure: ARTHROSCOPY, KNEE, WITH CHONDROPLASTY;  Surgeon: Aiden Clarke DO;  Location: [facility] OR;  Service: Orthopedics;  Laterality: Right;     SECTION      KNEE ARTHROSCOPY W/ MENISCECTOMY Right 2020    Procedure: ARTHROSCOPY, KNEE, WITH MENISCECTOMY;  Surgeon: Aiden Clarke DO;  Location: [facility] OR;  Service: Orthopedics;  Laterality: Right;  Equipment: Scope; [employer]par Meniscus Repair System; Maria Eugenia chondral drill set  Vendor: [employer]; [place]  Table: Supine    KNEE ARTHROSCOPY W/ PLICA EXCISION Right 2020    Procedure: EXCISION, PLICA, KNEE, ARTHROSCOPIC;  Surgeon: Aiden Clarke DO;  Location: [facility] OR;  Service: Orthopedics;  Laterality: Right;     Family History   Problem Relation Age of Onset    Hypertension Mother     Alcohol abuse Father         none    Breast cancer Neg Hx     Ovarian cancer Neg Hx      Social History     Socioeconomic History    Marital status: Single   Tobacco Use    Smoking status: Never    Smokeless tobacco: Never   Substance and Sexual Activity    Alcohol use: No    Drug use: No    Sexual activity: Yes     Partners: Male     Birth control/protection: None     Social Determinants of Health     Financial Resource Strain: Low Risk     Difficulty of Paying Living Expenses: Not very hard   Food Insecurity: No Food Insecurity    Worried About Running Out of Food in the Last Year:  Never true    Ran Out of Food in the Last Year: Never true   Transportation Needs: No Transportation Needs    Lack of Transportation (Medical): No    Lack of Transportation (Non-Medical): No   Physical Activity: Sufficiently Active    Days of Exercise per Week: 5 days    Minutes of Exercise per Session: 30 min   Stress: No Stress Concern Present    Feeling of Stress : Only a little   Social Connections: Unknown    Frequency of Communication with Friends and Family: Three times a week    Frequency of Social Gatherings with Friends and Family: Once a week    Active Member of Clubs or Organizations: No    Attends Club or Organization Meetings: Never    Marital Status: Never    Housing Stability: Low Risk     Unable to Pay for Housing in the Last Year: No    Number of Places Lived in the Last Year: 1    Unstable Housing in the Last Year: No       Current Outpatient Medications   Medication Sig Dispense Refill    amoxicillin-clavulanate 875-125mg (AUGMENTIN) 875-125 mg per tablet Take 1 tablet by mouth 2 (two) times a day. 20 tablet 0    diclofenac (VOLTAREN) 75 MG EC tablet Take 1 tablet (75 mg total) by mouth 2 (two) times daily. 60 tablet 0    furosemide (LASIX) 40 MG tablet Take 1 tablet (40 mg total) by mouth once daily. 30 tablet 0    metroNIDAZOLE (FLAGYL) 250 MG tablet Take 1 tablet (250 mg total) by mouth 3 (three) times daily. 30 tablet 0    phentermine (ADIPEX-P) 37.5 mg tablet Take 1 tablet (37.5 mg total) by mouth before breakfast. 30 tablet 0    topiramate (TOPAMAX) 15 MG capsule Take 1 capsule (15 mg total) by mouth once daily. 30 capsule 0     No current facility-administered medications for this visit.     Review of patient's allergies indicates:   Allergen Reactions    Adhesive      Causes skin tearing and redness        Review of Systems   HENT:  Negative for congestion.    Respiratory:  Negative for cough and shortness of breath.    Musculoskeletal:  Positive for arthralgias and joint swelling.  "  All other systems reviewed and are negative.    Objective:      Vitals:    01/26/23 1605   BP: (!) 140/83   Pulse: 85   Weight: 104.1 kg (229 lb 6.4 oz)   Height: 4' 11" (1.499 m)     Physical Exam  Vitals and nursing note reviewed.   Constitutional:       General: She is not in acute distress.     Appearance: Normal appearance.   Cardiovascular:      Pulses:           Dorsalis pedis pulses are 2+ on the right side and 2+ on the left side.        Posterior tibial pulses are 2+ on the right side and 2+ on the left side.   Pulmonary:      Effort: Pulmonary effort is normal.   Musculoskeletal:         General: Swelling, tenderness and deformity present.      Right foot: No deformity.      Left foot: No deformity.        Feet:    Feet:      Right foot:      Skin integrity: Skin integrity normal.      Left foot:      Skin integrity: Erythema and warmth present.   Skin:     Capillary Refill: Capillary refill takes less than 2 seconds.   Neurological:      General: No focal deficit present.      Mental Status: She is alert.   Psychiatric:         Mood and Affect: Mood normal.         Behavior: Behavior normal.         Thought Content: Thought content normal.         Judgment: Judgment normal.                  Assessment:       1. Osteoarthritis of ankle and foot, unspecified laterality    2. Left foot pain        Plan:         Patient presents today she is complaining of left foot pain and swelling she relates no trauma or injury to the area she states that it has gotten progressively worse over the past 4-5 months.  Patient relates the last steroid shot she got helped her for about 2 months.  On evaluation I advised the patient we need to get an x-ray of the left foot she likely has some degenerative spurring on the dorsal midfoot which is causing the area to become inflamed and tender she may even have a cyst in the area I made the patient aware that she needs to make sure she has appropriate support at all times and " she should not wear shoes that are too tight or squeeze the top of her foot I did dispense the patient some power step full length arch supports I have started the patient on diclofenac I ordered an x-ray that will take prior to her next visit and appointment I plan to see the patient for follow-up in several weeks and will re-evaluate how she is doing I did hold off on any additional therapeutic injections at this time until we see how she is doing with the arch support.  Patient was in understanding and agreement with everything discussed I explained to the patient she needs to have good support at all times especially the amount of time that she is on her feet.  Total time including discussion evaluation treatment discussion of treatment options treatment plan dispensing and fitting the patient for power step full length arch supports and evaluation equaled 30 minutes. This note was created using [employer] voice recognition software that occasionally misinterpreted phrases or words.

## 2023-01-31 ENCOUNTER — PATIENT MESSAGE (OUTPATIENT)
Dept: ORTHOPEDICS | Facility: CLINIC | Age: 48
End: 2023-01-31
Payer: COMMERCIAL

## 2023-02-09 ENCOUNTER — OFFICE VISIT (OUTPATIENT)
Dept: PODIATRY | Facility: CLINIC | Age: 48
End: 2023-02-09
Payer: COMMERCIAL

## 2023-02-09 ENCOUNTER — HOSPITAL ENCOUNTER (OUTPATIENT)
Dept: RADIOLOGY | Facility: HOSPITAL | Age: 48
Discharge: HOME | End: 2023-02-09
Attending: PODIATRIST
Payer: COMMERCIAL

## 2023-02-09 VITALS
SYSTOLIC BLOOD PRESSURE: 149 MMHG | BODY MASS INDEX: 46.16 KG/M2 | HEART RATE: 99 BPM | WEIGHT: 229 LBS | DIASTOLIC BLOOD PRESSURE: 88 MMHG | HEIGHT: 59 IN

## 2023-02-09 DIAGNOSIS — M19.079 OSTEOARTHRITIS OF ANKLE AND FOOT, UNSPECIFIED LATERALITY: ICD-10-CM

## 2023-02-09 DIAGNOSIS — M79.672 LEFT FOOT PAIN: Primary | ICD-10-CM

## 2023-02-09 PROCEDURE — 99213 PR OFFICE/OUTPT VISIT, EST, LEVL III, 20-29 MIN: ICD-10-PCS | Mod: S$GLB,,, | Performed by: PODIATRIST

## 2023-02-09 PROCEDURE — 3077F SYST BP >= 140 MM HG: CPT | Mod: S$GLB,,, | Performed by: PODIATRIST

## 2023-02-09 PROCEDURE — 3077F PR MOST RECENT SYSTOLIC BLOOD PRESSURE >= 140 MM HG: ICD-10-PCS | Mod: S$GLB,,, | Performed by: PODIATRIST

## 2023-02-09 PROCEDURE — 99213 OFFICE O/P EST LOW 20 MIN: CPT | Mod: S$GLB,,, | Performed by: PODIATRIST

## 2023-02-09 PROCEDURE — 1159F MED LIST DOCD IN RCRD: CPT | Mod: S$GLB,,, | Performed by: PODIATRIST

## 2023-02-09 PROCEDURE — 3079F DIAST BP 80-89 MM HG: CPT | Mod: S$GLB,,, | Performed by: PODIATRIST

## 2023-02-09 PROCEDURE — 3008F PR BODY MASS INDEX (BMI) DOCUMENTED: ICD-10-PCS | Mod: S$GLB,,, | Performed by: PODIATRIST

## 2023-02-09 PROCEDURE — 1160F PR REVIEW ALL MEDS BY PRESCRIBER/CLIN PHARMACIST DOCUMENTED: ICD-10-PCS | Mod: S$GLB,,, | Performed by: PODIATRIST

## 2023-02-09 PROCEDURE — 3079F PR MOST RECENT DIASTOLIC BLOOD PRESSURE 80-89 MM HG: ICD-10-PCS | Mod: S$GLB,,, | Performed by: PODIATRIST

## 2023-02-09 PROCEDURE — 73630 X-RAY EXAM OF FOOT: CPT | Mod: TC,PN,LT

## 2023-02-09 PROCEDURE — 1160F RVW MEDS BY RX/DR IN RCRD: CPT | Mod: S$GLB,,, | Performed by: PODIATRIST

## 2023-02-09 PROCEDURE — 3008F BODY MASS INDEX DOCD: CPT | Mod: S$GLB,,, | Performed by: PODIATRIST

## 2023-02-09 PROCEDURE — 1159F PR MEDICATION LIST DOCUMENTED IN MEDICAL RECORD: ICD-10-PCS | Mod: S$GLB,,, | Performed by: PODIATRIST

## 2023-02-09 PROCEDURE — 99999 PR PBB SHADOW E&M-EST. PATIENT-LVL III: ICD-10-PCS | Mod: PBBFAC,,, | Performed by: PODIATRIST

## 2023-02-09 PROCEDURE — 73630 X-RAY EXAM OF FOOT: CPT | Mod: 26,LT,, | Performed by: RADIOLOGY

## 2023-02-09 PROCEDURE — 73630 XR FOOT COMPLETE 3 VIEW LEFT: ICD-10-PCS | Mod: 26,LT,, | Performed by: RADIOLOGY

## 2023-02-09 PROCEDURE — 99999 PR PBB SHADOW E&M-EST. PATIENT-LVL III: CPT | Mod: PBBFAC,,, | Performed by: PODIATRIST

## 2023-02-12 NOTE — PROGRESS NOTES
Subjective:       Patient ID: Nereida Narayanan is a 47 y.o. female.    Chief Complaint: Follow-up and Foot Pain    Patient presents today she is complaining of left foot pain and swelling she relates no trauma or injury to the area she states that it has gotten progressively worse over the past 4-5 months.      Past Medical History:   Diagnosis Date    Morbid obesity with BMI of 45.0-49.9, adult      Past Surgical History:   Procedure Laterality Date    ARTHROSCOPIC CHONDROPLASTY OF KNEE JOINT Right 2020    Procedure: ARTHROSCOPY, KNEE, WITH CHONDROPLASTY;  Surgeon: Aiden Clarke DO;  Location: [facility] OR;  Service: Orthopedics;  Laterality: Right;     SECTION      KNEE ARTHROSCOPY W/ MENISCECTOMY Right 2020    Procedure: ARTHROSCOPY, KNEE, WITH MENISCECTOMY;  Surgeon: Aiden Clarke DO;  Location: [facility] OR;  Service: Orthopedics;  Laterality: Right;  Equipment: Scope; [employer]par Meniscus Repair System; [place] chondral drill set  Vendor: [employer]; [place]  Table: Supine    KNEE ARTHROSCOPY W/ PLICA EXCISION Right 2020    Procedure: EXCISION, PLICA, KNEE, ARTHROSCOPIC;  Surgeon: Aiden Clarke DO;  Location: [facility] OR;  Service: Orthopedics;  Laterality: Right;     Family History   Problem Relation Age of Onset    Hypertension Mother     Alcohol abuse Father         none    Breast cancer Neg Hx     Ovarian cancer Neg Hx      Social History     Socioeconomic History    Marital status: Single   Tobacco Use    Smoking status: Never    Smokeless tobacco: Never   Substance and Sexual Activity    Alcohol use: No    Drug use: No    Sexual activity: Yes     Partners: Male     Birth control/protection: None     Social Determinants of Health     Financial Resource Strain: Low Risk     Difficulty of Paying Living Expenses: Not very hard   Food Insecurity: No Food Insecurity    Worried About Running Out of Food in the Last Year: Never true    Ran Out of Food in the Last Year: Never true   Transportation  "Needs: No Transportation Needs    Lack of Transportation (Medical): No    Lack of Transportation (Non-Medical): No   Physical Activity: Sufficiently Active    Days of Exercise per Week: 5 days    Minutes of Exercise per Session: 30 min   Stress: No Stress Concern Present    Feeling of Stress : Only a little   Social Connections: Unknown    Frequency of Communication with Friends and Family: Three times a week    Frequency of Social Gatherings with Friends and Family: Once a week    Active Member of Clubs or Organizations: No    Attends Club or Organization Meetings: Never    Marital Status: Never    Housing Stability: Low Risk     Unable to Pay for Housing in the Last Year: No    Number of Places Lived in the Last Year: 1    Unstable Housing in the Last Year: No       Current Outpatient Medications   Medication Sig Dispense Refill    diclofenac (VOLTAREN) 75 MG EC tablet Take 1 tablet (75 mg total) by mouth 2 (two) times daily. 60 tablet 0    metroNIDAZOLE (FLAGYL) 250 MG tablet Take 1 tablet (250 mg total) by mouth 3 (three) times daily. 30 tablet 0    phentermine (ADIPEX-P) 37.5 mg tablet Take 1 tablet (37.5 mg total) by mouth before breakfast. 30 tablet 0    furosemide (LASIX) 40 MG tablet Take 1 tablet (40 mg total) by mouth once daily. 30 tablet 0    topiramate (TOPAMAX) 15 MG capsule Take 1 capsule (15 mg total) by mouth once daily. 30 capsule 0     No current facility-administered medications for this visit.     Review of patient's allergies indicates:   Allergen Reactions    Adhesive      Causes skin tearing and redness        Review of Systems   HENT:  Negative for congestion.    Respiratory:  Negative for cough and shortness of breath.    Musculoskeletal:  Positive for arthralgias and joint swelling.   All other systems reviewed and are negative.    Objective:      Vitals:    02/09/23 1540   BP: (!) 149/88   Pulse: 99   Weight: 103.9 kg (229 lb)   Height: 4' 11" (1.499 m)     Physical Exam  Vitals " and nursing note reviewed.   Constitutional:       General: She is not in acute distress.     Appearance: Normal appearance.   Cardiovascular:      Pulses:           Dorsalis pedis pulses are 2+ on the right side and 2+ on the left side.        Posterior tibial pulses are 2+ on the right side and 2+ on the left side.   Pulmonary:      Effort: Pulmonary effort is normal.   Musculoskeletal:         General: Swelling, tenderness and deformity present.      Right foot: No deformity.      Left foot: No deformity.        Feet:    Feet:      Right foot:      Skin integrity: Skin integrity normal.      Left foot:      Skin integrity: Erythema and warmth present.   Skin:     Capillary Refill: Capillary refill takes less than 2 seconds.   Neurological:      General: No focal deficit present.      Mental Status: She is alert.   Psychiatric:         Mood and Affect: Mood normal.         Behavior: Behavior normal.         Thought Content: Thought content normal.         Judgment: Judgment normal.                  Assessment:       1. Left foot pain    2. Osteoarthritis of ankle and foot, unspecified laterality        Plan:         Patient presents today she is complaining of left foot pain and swelling she relates no trauma or injury to the area she states that it has gotten progressively worse over the past 4-5 months.  Patient is presenting today for follow-up left foot pain she states the diclofenac has been fine she is tolerated it well she is wearing the power step full length arch supports she tolerated these well she states they definitely have helped some she did not have them with her today because she is left them in her work shoes I advised the patient we need to add additional support to these so she needs to bring them by the office and I can add a small blue arch pad to the plantar arch of the patient's power steps patient was made aware it is important that she has appropriate support at all times.  Patient is  going to bring her arch supports by the office I will add small blue arch padding to the plantar arch bilateral then I will plan to follow up with her as needed she is going to continue taking the diclofenac I have advised the patient after she gets the new arch support she is worn these for a few weeks if she is not significantly better she is to contact us for further evaluation and treatment and additional adjustment of the arch supports as needed.  Patient was advised she should be wearing the power steps at all times.  This note was created using [employer] voice recognition software that occasionally misinterpreted phrases or words.

## 2023-02-14 ENCOUNTER — PATIENT MESSAGE (OUTPATIENT)
Dept: ORTHOPEDICS | Facility: CLINIC | Age: 48
End: 2023-02-14
Payer: COMMERCIAL

## 2023-02-17 ENCOUNTER — PATIENT MESSAGE (OUTPATIENT)
Dept: ORTHOPEDICS | Facility: CLINIC | Age: 48
End: 2023-02-17
Payer: COMMERCIAL

## 2023-02-17 ENCOUNTER — OFFICE VISIT (OUTPATIENT)
Dept: ORTHOPEDICS | Facility: CLINIC | Age: 48
End: 2023-02-17
Payer: COMMERCIAL

## 2023-02-17 ENCOUNTER — TELEPHONE (OUTPATIENT)
Dept: ORTHOPEDICS | Facility: CLINIC | Age: 48
End: 2023-02-17
Payer: COMMERCIAL

## 2023-02-17 VITALS — WEIGHT: 229 LBS | HEIGHT: 59 IN | BODY MASS INDEX: 46.16 KG/M2 | RESPIRATION RATE: 18 BRPM

## 2023-02-17 DIAGNOSIS — M17.0 PRIMARY OSTEOARTHRITIS OF BOTH KNEES: Primary | ICD-10-CM

## 2023-02-17 PROCEDURE — 99499 UNLISTED E&M SERVICE: CPT | Mod: S$GLB,,, | Performed by: ORTHOPAEDIC SURGERY

## 2023-02-17 PROCEDURE — 99499 NO LOS: ICD-10-PCS | Mod: S$GLB,,, | Performed by: ORTHOPAEDIC SURGERY

## 2023-02-17 PROCEDURE — 3008F PR BODY MASS INDEX (BMI) DOCUMENTED: ICD-10-PCS | Mod: CPTII,S$GLB,, | Performed by: ORTHOPAEDIC SURGERY

## 2023-02-17 PROCEDURE — 20610 LARGE JOINT ASPIRATION/INJECTION: BILATERAL KNEE: ICD-10-PCS | Mod: 50,S$GLB,, | Performed by: ORTHOPAEDIC SURGERY

## 2023-02-17 PROCEDURE — 99999 PR PBB SHADOW E&M-EST. PATIENT-LVL III: CPT | Mod: PBBFAC,,, | Performed by: ORTHOPAEDIC SURGERY

## 2023-02-17 PROCEDURE — 1159F MED LIST DOCD IN RCRD: CPT | Mod: CPTII,S$GLB,, | Performed by: ORTHOPAEDIC SURGERY

## 2023-02-17 PROCEDURE — 99999 PR PBB SHADOW E&M-EST. PATIENT-LVL III: ICD-10-PCS | Mod: PBBFAC,,, | Performed by: ORTHOPAEDIC SURGERY

## 2023-02-17 PROCEDURE — 1159F PR MEDICATION LIST DOCUMENTED IN MEDICAL RECORD: ICD-10-PCS | Mod: CPTII,S$GLB,, | Performed by: ORTHOPAEDIC SURGERY

## 2023-02-17 PROCEDURE — 20610 DRAIN/INJ JOINT/BURSA W/O US: CPT | Mod: 50,S$GLB,, | Performed by: ORTHOPAEDIC SURGERY

## 2023-02-17 PROCEDURE — 3008F BODY MASS INDEX DOCD: CPT | Mod: CPTII,S$GLB,, | Performed by: ORTHOPAEDIC SURGERY

## 2023-02-17 RX ORDER — TRIAMCINOLONE ACETONIDE 40 MG/ML
40 INJECTION, SUSPENSION INTRA-ARTICULAR; INTRAMUSCULAR
Status: DISCONTINUED | OUTPATIENT
Start: 2023-02-17 | End: 2023-02-17 | Stop reason: HOSPADM

## 2023-02-17 RX ADMIN — TRIAMCINOLONE ACETONIDE 40 MG: 40 INJECTION, SUSPENSION INTRA-ARTICULAR; INTRAMUSCULAR at 12:02

## 2023-02-17 NOTE — PROGRESS NOTES
Patient here for bilateral knee steroid injections.  Viscosupplementation injections were denied by insurance.

## 2023-02-17 NOTE — PROCEDURES
Large Joint Aspiration/Injection: bilateral knee    Date/Time: 2/17/2023 12:15 PM  Performed by: Shayne Arnett MD  Authorized by: Shayne Arnett MD     Consent Done?:  Yes (Verbal)  Indications:  Pain and arthritis  Site marked: the procedure site was marked    Timeout: prior to procedure the correct patient, procedure, and site was verified    Prep: patient was prepped and draped in usual sterile fashion      Local anesthesia used?: Yes    Anesthesia:  Local infiltration  Local anesthetic:  Lidocaine 1% without epinephrine  Anesthetic total (ml):  6      Details:  Needle Size:  22 G  Ultrasonic Guidance for needle placement?: No    Approach:  Anterolateral  Location:  Knee  Laterality:  Bilateral  Site:  Bilateral knee  Medications (Right):  40 mg triamcinolone acetonide 40 mg/mL  Medications (Left):  40 mg triamcinolone acetonide 40 mg/mL  Patient tolerance:  Patient tolerated the procedure well with no immediate complications

## 2023-02-17 NOTE — TELEPHONE ENCOUNTER
From: Angela Vargas   Sent: 2/16/2023  10:41 AM CST   To: [facility] Staff   Subject: Synvisc One                                       Hi   Synvisc -one and all other Viscosupplements/ Hyaluronic injections are a (plan exclusion not covered by pt. Payor).Please advise, thanks Angela.   [phone]

## 2023-03-28 ENCOUNTER — OFFICE VISIT (OUTPATIENT)
Dept: FAMILY MEDICINE | Facility: CLINIC | Age: 48
End: 2023-03-28
Payer: COMMERCIAL

## 2023-03-28 VITALS
HEART RATE: 99 BPM | BODY MASS INDEX: 46.07 KG/M2 | SYSTOLIC BLOOD PRESSURE: 142 MMHG | DIASTOLIC BLOOD PRESSURE: 67 MMHG | HEIGHT: 59 IN | WEIGHT: 228.5 LBS | OXYGEN SATURATION: 98 %

## 2023-03-28 DIAGNOSIS — R03.0 ELEVATED BP WITHOUT DIAGNOSIS OF HYPERTENSION: ICD-10-CM

## 2023-03-28 DIAGNOSIS — R63.4 WEIGHT LOSS: ICD-10-CM

## 2023-03-28 DIAGNOSIS — R60.0 BILATERAL LOWER EXTREMITY EDEMA: ICD-10-CM

## 2023-03-28 DIAGNOSIS — Z00.00 ANNUAL PHYSICAL EXAM: Primary | ICD-10-CM

## 2023-03-28 DIAGNOSIS — Z12.11 SCREENING FOR COLON CANCER: ICD-10-CM

## 2023-03-28 PROCEDURE — 1160F PR REVIEW ALL MEDS BY PRESCRIBER/CLIN PHARMACIST DOCUMENTED: ICD-10-PCS | Mod: CPTII,S$GLB,, | Performed by: NURSE PRACTITIONER

## 2023-03-28 PROCEDURE — 90471 IMMUNIZATION ADMIN: CPT | Mod: S$GLB,,, | Performed by: NURSE PRACTITIONER

## 2023-03-28 PROCEDURE — 90686 FLU VACCINE (QUAD) GREATER THAN OR EQUAL TO 3YO PRESERVATIVE FREE IM: ICD-10-PCS | Mod: S$GLB,,, | Performed by: NURSE PRACTITIONER

## 2023-03-28 PROCEDURE — 1159F PR MEDICATION LIST DOCUMENTED IN MEDICAL RECORD: ICD-10-PCS | Mod: CPTII,S$GLB,, | Performed by: NURSE PRACTITIONER

## 2023-03-28 PROCEDURE — 3078F PR MOST RECENT DIASTOLIC BLOOD PRESSURE < 80 MM HG: ICD-10-PCS | Mod: CPTII,S$GLB,, | Performed by: NURSE PRACTITIONER

## 2023-03-28 PROCEDURE — 1160F RVW MEDS BY RX/DR IN RCRD: CPT | Mod: CPTII,S$GLB,, | Performed by: NURSE PRACTITIONER

## 2023-03-28 PROCEDURE — 3008F PR BODY MASS INDEX (BMI) DOCUMENTED: ICD-10-PCS | Mod: CPTII,S$GLB,, | Performed by: NURSE PRACTITIONER

## 2023-03-28 PROCEDURE — 90686 IIV4 VACC NO PRSV 0.5 ML IM: CPT | Mod: S$GLB,,, | Performed by: NURSE PRACTITIONER

## 2023-03-28 PROCEDURE — 90471 FLU VACCINE (QUAD) GREATER THAN OR EQUAL TO 3YO PRESERVATIVE FREE IM: ICD-10-PCS | Mod: S$GLB,,, | Performed by: NURSE PRACTITIONER

## 2023-03-28 PROCEDURE — 3077F PR MOST RECENT SYSTOLIC BLOOD PRESSURE >= 140 MM HG: ICD-10-PCS | Mod: CPTII,S$GLB,, | Performed by: NURSE PRACTITIONER

## 2023-03-28 PROCEDURE — 3077F SYST BP >= 140 MM HG: CPT | Mod: CPTII,S$GLB,, | Performed by: NURSE PRACTITIONER

## 2023-03-28 PROCEDURE — 99999 PR PBB SHADOW E&M-EST. PATIENT-LVL IV: CPT | Mod: PBBFAC,,, | Performed by: NURSE PRACTITIONER

## 2023-03-28 PROCEDURE — 99396 PR PREVENTIVE VISIT,EST,40-64: ICD-10-PCS | Mod: 25,S$GLB,, | Performed by: NURSE PRACTITIONER

## 2023-03-28 PROCEDURE — 99999 PR PBB SHADOW E&M-EST. PATIENT-LVL IV: ICD-10-PCS | Mod: PBBFAC,,, | Performed by: NURSE PRACTITIONER

## 2023-03-28 PROCEDURE — 3078F DIAST BP <80 MM HG: CPT | Mod: CPTII,S$GLB,, | Performed by: NURSE PRACTITIONER

## 2023-03-28 PROCEDURE — 1159F MED LIST DOCD IN RCRD: CPT | Mod: CPTII,S$GLB,, | Performed by: NURSE PRACTITIONER

## 2023-03-28 PROCEDURE — 3008F BODY MASS INDEX DOCD: CPT | Mod: CPTII,S$GLB,, | Performed by: NURSE PRACTITIONER

## 2023-03-28 PROCEDURE — 99396 PREV VISIT EST AGE 40-64: CPT | Mod: 25,S$GLB,, | Performed by: NURSE PRACTITIONER

## 2023-03-28 RX ORDER — FUROSEMIDE 40 MG/1
40 TABLET ORAL DAILY
Qty: 30 TABLET | Refills: 3 | Status: SHIPPED | OUTPATIENT
Start: 2023-03-28 | End: 2023-03-28

## 2023-03-28 RX ORDER — TOPIRAMATE SPINKLE 15 MG/1
15 CAPSULE ORAL DAILY
Qty: 30 CAPSULE | Refills: 3 | Status: SHIPPED | OUTPATIENT
Start: 2023-03-28 | End: 2023-03-28

## 2023-03-28 RX ORDER — TOPIRAMATE SPINKLE 15 MG/1
15 CAPSULE ORAL DAILY
Qty: 30 CAPSULE | Refills: 3 | Status: SHIPPED | OUTPATIENT
Start: 2023-03-28 | End: 2023-08-24

## 2023-03-28 RX ORDER — FUROSEMIDE 40 MG/1
40 TABLET ORAL DAILY
Qty: 30 TABLET | Refills: 3 | Status: SHIPPED | OUTPATIENT
Start: 2023-03-28 | End: 2023-10-09 | Stop reason: SDUPTHER

## 2023-03-28 NOTE — PROGRESS NOTES
Subjective:       Patient ID: Nereida Narayanan is a 47 y.o. female.    Chief Complaint: Annual Exam  Nereida Narayanan presents today for routine annual exam. Previous patient of ZELDA Valadez MD. Last seen in 1/5/2023. This is her first visit with me.     Ms. Narayanan is generally well. Denies abnormal vaginal bleeding, vaginal discharge, itching, or burning. No dysuria or hematuria.   She just moved to [place]. Requesting refills of her medications.    Patient Active Problem List   Diagnosis    Weakness    Knee pain, right    Trichimoniasis    Morbid obesity    Osteoarthritis of both knees    BMI 45.0-49.9, adult    Osteoarthritis of ankle and foot    Left foot pain       Current Outpatient Medications:     furosemide (LASIX) 40 MG tablet, Take 1 tablet (40 mg total) by mouth once daily., Disp: 30 tablet, Rfl: 3    metroNIDAZOLE (FLAGYL) 250 MG tablet, Take 1 tablet (250 mg total) by mouth 3 (three) times daily. (Patient not taking: Reported on 3/28/2023), Disp: 30 tablet, Rfl: 0    phentermine (ADIPEX-P) 37.5 mg tablet, Take 1 tablet (37.5 mg total) by mouth before breakfast. (Patient not taking: Reported on 3/28/2023), Disp: 30 tablet, Rfl: 0    topiramate (TOPAMAX) 15 MG capsule, Take 1 capsule (15 mg total) by mouth once daily., Disp: 30 capsule, Rfl: 3    The following portions of the patient's history were reviewed and updated as appropriate: allergies, past family history, past medical history, past social history and past surgical history.    Review of Systems   Constitutional:  Negative for appetite change, fatigue, fever and unexpected weight change.   HENT:  Negative for hearing loss, rhinorrhea, sneezing, sore throat and trouble swallowing.    Eyes:  Negative for visual disturbance.   Respiratory:  Negative for cough, shortness of breath and wheezing.    Cardiovascular:  Negative for chest pain and palpitations.   Gastrointestinal:  Negative for abdominal pain, constipation, diarrhea, nausea and vomiting.  "  Genitourinary:  Negative for difficulty urinating, dysuria, frequency and hematuria.   Musculoskeletal:  Negative for arthralgias and myalgias.   Neurological:  Negative for dizziness, weakness and numbness.   Psychiatric/Behavioral:  Negative for sleep disturbance. The patient is not nervous/anxious.      Objective:      BP (!) 142/67 (BP Location: Left arm, Patient Position: Sitting, BP Method: Large (Automatic))   Pulse 99   Ht 4' 11" (1.499 m)   Wt 103.6 kg (228 lb 8 oz)   SpO2 98%   BMI 46.15 kg/m²     Physical Exam  Constitutional:       General: She is not in acute distress.     Appearance: Normal appearance.   HENT:      Head: Normocephalic and atraumatic.      Right Ear: Tympanic membrane normal.      Left Ear: Tympanic membrane normal.      Nose: Nose normal.      Mouth/Throat:      Mouth: Mucous membranes are moist.      Pharynx: Oropharynx is clear.   Eyes:      Extraocular Movements: Extraocular movements intact.      Pupils: Pupils are equal, round, and reactive to light.   Cardiovascular:      Rate and Rhythm: Normal rate and regular rhythm.      Pulses: Normal pulses.      Heart sounds: Normal heart sounds.   Pulmonary:      Effort: Pulmonary effort is normal.      Breath sounds: Normal breath sounds.   Abdominal:      Palpations: Abdomen is soft.   Musculoskeletal:         General: No swelling or deformity. Normal range of motion.      Cervical back: Normal range of motion and neck supple.   Skin:     General: Skin is warm and dry.      Capillary Refill: Capillary refill takes less than 2 seconds.   Neurological:      General: No focal deficit present.      Mental Status: She is alert and oriented to person, place, and time.      Coordination: Coordination normal.      Gait: Gait normal.   Psychiatric:         Mood and Affect: Mood normal.         Behavior: Behavior normal.       Assessment:       1. Annual physical exam    2. Screening for colon cancer    3. Weight loss    4. Bilateral lower " extremity edema    5. Elevated BP without diagnosis of hypertension          Plan:   Nereida was seen today for annual exam.    Diagnoses and all orders for this visit:    Annual physical exam  -     Lipid Panel; Future  -     TSH; Future  -     Urinalysis    Screening for colon cancer  -     Cologuard Screening (Multitarget Stool DNA); Future  -     Cologuard Screening (Multitarget Stool DNA)    Weight loss  -     Discontinue: topiramate (TOPAMAX) 15 MG capsule; Take 1 capsule (15 mg total) by mouth once daily.  -     topiramate (TOPAMAX) 15 MG capsule; Take 1 capsule (15 mg total) by mouth once daily.    Bilateral lower extremity edema  -     Discontinue: topiramate (TOPAMAX) 15 MG capsule; Take 1 capsule (15 mg total) by mouth once daily.  -     Discontinue: furosemide (LASIX) 40 MG tablet; Take 1 tablet (40 mg total) by mouth once daily.  -     furosemide (LASIX) 40 MG tablet; Take 1 tablet (40 mg total) by mouth once daily.  -     topiramate (TOPAMAX) 15 MG capsule; Take 1 capsule (15 mg total) by mouth once daily.    Elevated BP without diagnosis of hypertension    Other orders  -     Influenza - Quadrivalent (PF)      Refilled medications for 90 days; encouraged pt to establish care with PCP.

## 2023-03-28 NOTE — PROGRESS NOTES
Two patient identifiers verified. Allergies reviewed.  FLU Vaccine IM administered to Right deltoid per NP order. Patient tolerated injection well: no redness, bleeding, or bruising noted to injection site. Patient instructed to remain in clinic setting for 15 minutes.

## 2023-04-05 ENCOUNTER — PATIENT OUTREACH (OUTPATIENT)
Dept: ADMINISTRATIVE | Facility: HOSPITAL | Age: 48
End: 2023-04-05
Payer: COMMERCIAL

## 2023-04-06 ENCOUNTER — PATIENT MESSAGE (OUTPATIENT)
Dept: BARIATRICS | Facility: CLINIC | Age: 48
End: 2023-04-06
Payer: COMMERCIAL

## 2023-04-11 ENCOUNTER — PATIENT MESSAGE (OUTPATIENT)
Dept: ADMINISTRATIVE | Facility: HOSPITAL | Age: 48
End: 2023-04-11
Payer: COMMERCIAL

## 2023-04-12 ENCOUNTER — LAB VISIT (OUTPATIENT)
Dept: LAB | Facility: HOSPITAL | Age: 48
End: 2023-04-12
Attending: NURSE PRACTITIONER
Payer: COMMERCIAL

## 2023-04-12 DIAGNOSIS — Z00.00 ANNUAL PHYSICAL EXAM: ICD-10-CM

## 2023-04-12 LAB
CHOLEST SERPL-MCNC: 233 MG/DL (ref 120–199)
CHOLEST/HDLC SERPL: 3.7 {RATIO} (ref 2–5)
HDLC SERPL-MCNC: 63 MG/DL (ref 40–75)
HDLC SERPL: 27 % (ref 20–50)
LDLC SERPL CALC-MCNC: 153.8 MG/DL (ref 63–159)
NONHDLC SERPL-MCNC: 170 MG/DL
TRIGL SERPL-MCNC: 81 MG/DL (ref 30–150)

## 2023-04-12 PROCEDURE — 84443 ASSAY THYROID STIM HORMONE: CPT | Performed by: NURSE PRACTITIONER

## 2023-04-12 PROCEDURE — 80061 LIPID PANEL: CPT | Performed by: NURSE PRACTITIONER

## 2023-04-12 PROCEDURE — 36415 COLL VENOUS BLD VENIPUNCTURE: CPT | Mod: PO | Performed by: NURSE PRACTITIONER

## 2023-04-13 LAB — TSH SERPL DL<=0.005 MIU/L-ACNC: 2.17 UIU/ML (ref 0.4–4)

## 2023-05-02 ENCOUNTER — OFFICE VISIT (OUTPATIENT)
Dept: SPORTS MEDICINE | Facility: CLINIC | Age: 48
End: 2023-05-02
Payer: COMMERCIAL

## 2023-05-02 VITALS
BODY MASS INDEX: 46.37 KG/M2 | DIASTOLIC BLOOD PRESSURE: 97 MMHG | WEIGHT: 230 LBS | HEIGHT: 59 IN | HEART RATE: 86 BPM | SYSTOLIC BLOOD PRESSURE: 172 MMHG

## 2023-05-02 DIAGNOSIS — E66.01 MORBID OBESITY: ICD-10-CM

## 2023-05-02 DIAGNOSIS — M17.0 PRIMARY OSTEOARTHRITIS OF BOTH KNEES: ICD-10-CM

## 2023-05-02 DIAGNOSIS — G89.29 CHRONIC PAIN OF RIGHT KNEE: Primary | ICD-10-CM

## 2023-05-02 DIAGNOSIS — M25.561 CHRONIC PAIN OF RIGHT KNEE: Primary | ICD-10-CM

## 2023-05-02 PROCEDURE — 3077F SYST BP >= 140 MM HG: CPT | Mod: CPTII,S$GLB,, | Performed by: ORTHOPAEDIC SURGERY

## 2023-05-02 PROCEDURE — 3080F DIAST BP >= 90 MM HG: CPT | Mod: CPTII,S$GLB,, | Performed by: ORTHOPAEDIC SURGERY

## 2023-05-02 PROCEDURE — 3008F BODY MASS INDEX DOCD: CPT | Mod: CPTII,S$GLB,, | Performed by: ORTHOPAEDIC SURGERY

## 2023-05-02 PROCEDURE — 99999 PR PBB SHADOW E&M-EST. PATIENT-LVL III: CPT | Mod: PBBFAC,,, | Performed by: ORTHOPAEDIC SURGERY

## 2023-05-02 PROCEDURE — 1159F PR MEDICATION LIST DOCUMENTED IN MEDICAL RECORD: ICD-10-PCS | Mod: CPTII,S$GLB,, | Performed by: ORTHOPAEDIC SURGERY

## 2023-05-02 PROCEDURE — 3080F PR MOST RECENT DIASTOLIC BLOOD PRESSURE >= 90 MM HG: ICD-10-PCS | Mod: CPTII,S$GLB,, | Performed by: ORTHOPAEDIC SURGERY

## 2023-05-02 PROCEDURE — 1160F RVW MEDS BY RX/DR IN RCRD: CPT | Mod: CPTII,S$GLB,, | Performed by: ORTHOPAEDIC SURGERY

## 2023-05-02 PROCEDURE — 1160F PR REVIEW ALL MEDS BY PRESCRIBER/CLIN PHARMACIST DOCUMENTED: ICD-10-PCS | Mod: CPTII,S$GLB,, | Performed by: ORTHOPAEDIC SURGERY

## 2023-05-02 PROCEDURE — 99204 PR OFFICE/OUTPT VISIT, NEW, LEVL IV, 45-59 MIN: ICD-10-PCS | Mod: S$GLB,,, | Performed by: ORTHOPAEDIC SURGERY

## 2023-05-02 PROCEDURE — 99999 PR PBB SHADOW E&M-EST. PATIENT-LVL III: ICD-10-PCS | Mod: PBBFAC,,, | Performed by: ORTHOPAEDIC SURGERY

## 2023-05-02 PROCEDURE — 99204 OFFICE O/P NEW MOD 45 MIN: CPT | Mod: S$GLB,,, | Performed by: ORTHOPAEDIC SURGERY

## 2023-05-02 PROCEDURE — 3008F PR BODY MASS INDEX (BMI) DOCUMENTED: ICD-10-PCS | Mod: CPTII,S$GLB,, | Performed by: ORTHOPAEDIC SURGERY

## 2023-05-02 PROCEDURE — 1159F MED LIST DOCD IN RCRD: CPT | Mod: CPTII,S$GLB,, | Performed by: ORTHOPAEDIC SURGERY

## 2023-05-02 PROCEDURE — 3077F PR MOST RECENT SYSTOLIC BLOOD PRESSURE >= 140 MM HG: ICD-10-PCS | Mod: CPTII,S$GLB,, | Performed by: ORTHOPAEDIC SURGERY

## 2023-05-02 RX ORDER — CELECOXIB 200 MG/1
200 CAPSULE ORAL 2 TIMES DAILY
Qty: 60 CAPSULE | Refills: 0 | Status: SHIPPED | OUTPATIENT
Start: 2023-05-02 | End: 2023-07-28 | Stop reason: ALTCHOICE

## 2023-05-02 NOTE — PROGRESS NOTES
CC: bilateral knee pain    47 y.o. Female presents today for evaluation of her bilateral R>L knee pain. The pain is located mostly medial of bilateral knees. Patient does have plans to be seen in bariatric clinic in  for evaluation of potential surgery. She is interested in any treatment options that could help the pain.    How long: Multiple year history  What makes it better: advil, aleve, heat  What makes it worse: exertion and weightbearing  Does it radiate: No  Attempted treatments: Recently received bilateral triamcinolone intra-articular steroid injections to knees in 2022 with 2 weeks of partial relief. Also tried PT in  with limited relief.  Pain score: Currently 5/10 which for her is low. Can be 8/10 at times  Any mechanical symptoms: Denies  Feelings of instability: Denies  Affect on ADLs: She is not as active as she wishes due to knee pain.    Occupation: Works at [employer]      PAST MEDICAL HISTORY:   Past Medical History:   Diagnosis Date    Morbid obesity with BMI of 45.0-49.9, adult        PAST SURGICAL HISTORY:   Past Surgical History:   Procedure Laterality Date    ARTHROSCOPIC CHONDROPLASTY OF KNEE JOINT Right 2020    Procedure: ARTHROSCOPY, KNEE, WITH CHONDROPLASTY;  Surgeon: Aiden Clarke DO;  Location: [facility] OR;  Service: Orthopedics;  Laterality: Right;     SECTION      KNEE ARTHROSCOPY W/ MENISCECTOMY Right 2020    Procedure: ARTHROSCOPY, KNEE, WITH MENISCECTOMY;  Surgeon: Aiden Clarke DO;  Location: [facility] OR;  Service: Orthopedics;  Laterality: Right;  Equipment: Scope; [employer] Truspar Meniscus Repair System; Fountain chondral drill set  Vendor: [employer]; [place]  Table: Supine    KNEE ARTHROSCOPY W/ PLICA EXCISION Right 2020    Procedure: EXCISION, PLICA, KNEE, ARTHROSCOPIC;  Surgeon: Aiden Clarke DO;  Location: [facility] OR;  Service: Orthopedics;  Laterality: Right;       FAMILY HISTORY:   Family History   Problem Relation Age of Onset    Hypertension  Mother     Alcohol abuse Father         none    Breast cancer Neg Hx     Ovarian cancer Neg Hx        SOCIAL HISTORY:   Social History     Socioeconomic History    Marital status: Single   Tobacco Use    Smoking status: Never    Smokeless tobacco: Never   Substance and Sexual Activity    Alcohol use: No    Drug use: No    Sexual activity: Yes     Partners: Male     Birth control/protection: None     Social Determinants of Health     Financial Resource Strain: Low Risk     Difficulty of Paying Living Expenses: Not hard at all   Food Insecurity: No Food Insecurity    Worried About Running Out of Food in the Last Year: Never true    Ran Out of Food in the Last Year: Never true   Transportation Needs: No Transportation Needs    Lack of Transportation (Medical): No    Lack of Transportation (Non-Medical): No   Physical Activity: Sufficiently Active    Days of Exercise per Week: 5 days    Minutes of Exercise per Session: 30 min   Stress: No Stress Concern Present    Feeling of Stress : Not at all   Social Connections: Unknown    Frequency of Communication with Friends and Family: Three times a week    Frequency of Social Gatherings with Friends and Family: Once a week    Active Member of Clubs or Organizations: No    Attends Club or Organization Meetings: Never    Marital Status: Never    Housing Stability: Low Risk     Unable to Pay for Housing in the Last Year: No    Number of Places Lived in the Last Year: 1    Unstable Housing in the Last Year: No       MEDICATIONS:     Current Outpatient Medications:     furosemide (LASIX) 40 MG tablet, Take 1 tablet (40 mg total) by mouth once daily., Disp: 30 tablet, Rfl: 3    topiramate (TOPAMAX) 15 MG capsule, Take 1 capsule (15 mg total) by mouth once daily., Disp: 30 capsule, Rfl: 3    celecoxib (CELEBREX) 200 MG capsule, Take 1 capsule (200 mg total) by mouth 2 (two) times daily., Disp: 60 capsule, Rfl: 0    metroNIDAZOLE (FLAGYL) 250 MG tablet, Take 1 tablet (250 mg  "total) by mouth 3 (three) times daily. (Patient not taking: Reported on 3/28/2023), Disp: 30 tablet, Rfl: 0    phentermine (ADIPEX-P) 37.5 mg tablet, Take 1 tablet (37.5 mg total) by mouth before breakfast. (Patient not taking: Reported on 3/28/2023), Disp: 30 tablet, Rfl: 0    ALLERGIES:   Review of patient's allergies indicates:   Allergen Reactions    Adhesive      Causes skin tearing and redness         PHYSICAL EXAMINATION:  BP (!) 172/97   Pulse 86   Ht 4' 11" (1.499 m)   Wt 104.3 kg (230 lb)   BMI 46.45 kg/m²   Vitals signs and nursing note have been reviewed.  General: In no acute distress, well developed, well nourished, no diaphoresis  Eyes: EOM full and smooth, no eye redness or discharge  HENT: normocephalic and atraumatic, neck supple, trachea midline, no nasal discharge, no external ear redness or discharge  Cardiovascular: 2+ and symmetric DP pulses bilaterally, no LE edema  Lungs: respirations non-labored, no conversational dyspnea   Abd: non-distended, no rigidity  MSK: no amputation or deformity, no swelling of extremities  Neuro: AAOx3, CN2-12 grossly intact  Skin: No rashes, warm and dry  Psychiatric: cooperative, pleasant, mood and affect appropriate for age    MUSCULOSKELETAL EXAM:    BILATERAL KNEE EXAMINATION   Affected side is compared to contralateral knee     Observation:  No edema, erythema, ecchymosis, or effusion noted.  No muscle atrophy of the thighs and calves noted.  No obvious bony deformities noted.   No genu valgus/varum noted. No recurvatum noted.    No tibial internal/external torsion.    No pes planus/cavus.    Tenderness:  Patella - none    Lateral joint line - none  Quad tendon - none   Medial joint line - + b/l  Patellar tendon - none   Medial plica - none  Tibial tubercle - none   Lateral plica - none  Pes anserine - + b/l   MCL prox - none  Distal ITB - none   MCL distal - none  MFC - none    LCL prox - none  LFC - none    LCL distal - none  Tibia - none    Fibula - " none    No obvious bursae, plicae, popliteal cysts, or tendon derangement palpated.          ROM:   Active extension to 0° on left without hyperextension, lag, or patellar J sign.   Active extension to 0° on right without hyperextension, lag, or patellar J sign.   Slight crepitus with extension bilaterally  Active flexion to 120° on left and 120° on right limited by soft tissue.    Strength: (bilaterally)  Knee Flexion - 5/5 on left and 5/5 on right  Knee Extension - 5/5 on left and 5/5 on right  Hip Flexion - 5/5 on left and 5/5 on right  Hip Extension - 5/5 on left and 5/5 on right  Ankle dorsiflexion - 5/5 on left and 5/5 on right  Ankle Plantarflexion - 5/5 on left and 5/5 on right    Patellofemoral Exam:  Patellar ballottement - negative  Bulge sign - negative  Patellar grind - negative on left, positive on right  No patellar laxity with medial and lateral translation   No apprehension with medial and lateral patellar translation.     Meniscus Testing:     No pain with terminal extension and flexion.  Shashas test - negative   Bounce home test - negative    Ligament Testing:  Lachman's test - negative  No laxity with varus testing at 0 and 30 degrees.  No laxity with valgus testing at 0 and 30 degrees.    Neurovascular Examination:   Antalgic gait  Sensation intact to light touch in the obturator, lateral/intermediate/medial/posterior femoral cutaneous, saphenous, and common peroneal nerves bilaterally.  Pulses intact at the DP and PT arteries bilaterally.    Capillary refill intact <2 seconds in all toes bilaterally.      IMAGIN. X-ray obtained on 2022 due to bilateral knee pain  2. X-ray images were reviewed personally by me and then directly with patient.  3. FINDINGS: X-ray images obtained demonstrate tricompartmental degenerative changes, most pronounced in the medial compartment bilaterally.  Kellgren and Clay grade 3  4. IMPRESSION: As above.       ASSESSMENT:      ICD-10-CM ICD-9-CM    1. Chronic pain of right knee  M25.561 719.46    G89.29 338.29   2. Primary osteoarthritis of both knees  M17.0 715.16   3. Morbid obesity  E66.01 278.01         PLAN:  1-3.  Chronic bilateral knee pain/osteoarthritis -     - Patient endorses chronic bilateral knee pain which has been present and slowly worsening over several years.    - XR images obtained on 02/14/2022 were personally reviewed with the patient. See above for further detail.    - We reviewed the natural history of osteoarthritis and a multipronged treatment approach. We reviewed the importance of addressing three different aspects to best manage this condition. Controlling the intra-articular immune reaction through medications and/or injections, improving local stability through bracing and/or injection, and improving functional stability through hip, core, and ankle strength, stability and mobility which may benefit from formal physical therapy.    - Discussed with patient importance of weight loss on reducing stress on knee joint thereby reducing pain. Patient with plans to see Bariatrics in June 2023 in this regard.    - Patient elects to follow-up at the end of the month for PRP injection initially for more severely affected right knee.    - Prescription ordered for Celecoxib to be used in placed of current advil/aleve. Education provided on avoidance of use on multiple NSAIDs concomitantly.    - EDUCATION FOR PRP: Education provided on the benefits, adverse effects, likely course of treatment and improvement, and post-injection expectations from PRP. Handout given to patient. Reviewed that it is a non-covered cash service. It may takes several treatments to have long standing resolution. The PRP injection may include tenotomy, and this was explained to the patient. We reviewed that initially after treatment pain may become more intense and this is an expected response. We instructed that improvement may be experienced within the first two  weeks and that most improvement will come between weeks 6 and 12. If there is no improvement, we would not likely repeat. If less than 90% improvement is experienced at 12 weeks, we would consider and discuss repeating.       Future planning includes - Contralateral PRP injection if good benefit is achieved from the injection on the right    All questions were answered to the best of my ability and all concerns were addressed at this time.    Follow up in approximately 4 weeks for above, or sooner if needed.      Total time spent face-to face with patient counseling or coordinating care including prognosis, differential diagnosis, risks and benefits of treatment, instructions, compliance risk reductions as well as non-face-to-face time personally spent reviewing medial record, medical documentation, and coordination of care.     EST MINUTES X   [id number] 10-19    [id number] 20-29    03162 30-39    [id number] 40-54    NEW     [id number] 15-29    24768 30-44    [id number] 45-59 X   [id number] 60-74    PHONE      5-10    [id number] 11-20    62808 21-30

## 2023-05-03 ENCOUNTER — PATIENT MESSAGE (OUTPATIENT)
Dept: SPORTS MEDICINE | Facility: CLINIC | Age: 48
End: 2023-05-03
Payer: COMMERCIAL

## 2023-06-05 NOTE — PROGRESS NOTES
PRE-PROCEDURE DIAGNOSIS:   1. Chronic pain of right knee  Sports Medicine US - Guidance for Needle Placement    traMADoL (ULTRAM) 50 mg tablet      2. Primary osteoarthritis of both knees  traMADoL (ULTRAM) 50 mg tablet          PROCEDURE TYPE: ACP under ultrasound guidance     RISKS, BENEFITS, ADVERSE EFFECTS: ACP: We reviewed that this is a medical procedure involving the harvesting of some of patients own blood. This tissue will be minimally manipulated and prepared for injection and after the tissue will be injected into damaged ligaments, tendons, muscle and/or joints for the purpose of strengthening and healing and/or lessening pain at the damaged area(s). The diagnosis, the nature of the treatment, and the theoretical basis of regenerative medicine has been explained. The patient was given the opportunity to ask any questions concerning the specific procedure. We further reviewed that in many medical circles this is considered experimental and/or investigational. It is not standard of care in many medical communities and it is also not covered under insurance. We further reviewed this treatment is part of a treatment program and all parts of the program are essential to a successful outcome, including but not limited to post-injection physical therapy at home and/or in office. As part of a treatment program, this procedure treatment may involve several injections given at each treatment session AND may require multiple treatment sessions. We reviewed that, while this is inherently a safe procedure, there are risks involved. The specific risks depend on the tissue being aspirated and the reinjection site of treatment.      RISKS INCLUDE, but not be limited to:   COMMON: Soreness and/or moderately increased pain with bruising for several days after each treatment and decreased range of motion from what may be normal for the patient on most days. Improvement in pain and function may take 4-12 weeks, occasionally  "longer in some cases   RARE: I understand these to include, but not be limited to infection, partial or complete tendon rupture, worsening pain, headache, bleeding (bruising or hematoma) allergic reaction (itching, rash, shortness of breath, seizures, and even death).     POTENTIAL BENEFITS include: reduction or elimination of pain and greater function. We also reviewed alternative or conventional methods of treatment and the probability that the proposed treatment will or will not be successful. No guarantee or assurance has been made relative to results that may be expected from these techniques. Furthermore, the patient understands that they may withdraw from the treatment or treatment plan at any time.      PROCEDURE DETAILS:       Timeout: Prior to procedure the correct patient, procedure, and site was verified.     Pure ACP Arthrex Preparation: Phlebotomy, platelet harvest, and ACP preparation  Sterile technique was used to phlebotomize 15 cc of the patients blood from a peripheral vein. This blood was  into density-divided layers using an Arthrex centrifuge. The layer of leukocyte poor ACP, a volume of 6 cc, was placed into a sterile syringe in preparation for injection.    Joint Injection: Using ultrasound guidance, the tip of the 18 G x 1.5" needle was seen to directly enter the joint space and 8 cc of clear synovial fluid was aspirated. Using a hemostat, the syringe was exchanged with the ACP syringe and the 6 cc ACP mixture was injected. Care was taken to stay within the involved joint for all ACP injected.       The procedure was tolerated well.  Patient is concerned about increase in pain and as such tramadol 50 mg tablets (12 in total) prescribed to be taken every 6 hours as needed.     DISCHARGE CONDITION: The patient tolerated the procedure well and there were no immediate complications. The patient was instructed to call the clinic immediately for any mild to moderate adverse side effects, " or to call 911 in the event of an emergency.     Complications: none     Estimated blood loss from injection procedure: none     Joint aspirate volume: 0 mL     POST PROCEDURE INSTRUCTIONS: Post-procedure care handout was provided and explained to the patient before leaving clinic.  Following the procedure, a sterile bandage was placed over the injection site.    Description of ultrasound utilization for needle guidance:    Ultrasound guidance was used for needle localization with SonAnnexonte Edge 2,  probe(s). Images were saved and stored for documentation. The right knee joint was well visualized. Dynamic visualization of the needle was continuous throughout the procedure and maintained good position and correct needle placement.

## 2023-06-15 ENCOUNTER — TELEPHONE (OUTPATIENT)
Dept: SURGERY | Facility: CLINIC | Age: 48
End: 2023-06-15
Payer: COMMERCIAL

## 2023-06-16 ENCOUNTER — OFFICE VISIT (OUTPATIENT)
Dept: SPORTS MEDICINE | Facility: CLINIC | Age: 48
End: 2023-06-16
Payer: COMMERCIAL

## 2023-06-16 VITALS
WEIGHT: 230 LBS | SYSTOLIC BLOOD PRESSURE: 146 MMHG | DIASTOLIC BLOOD PRESSURE: 87 MMHG | BODY MASS INDEX: 46.37 KG/M2 | HEIGHT: 59 IN | HEART RATE: 86 BPM

## 2023-06-16 DIAGNOSIS — M17.0 PRIMARY OSTEOARTHRITIS OF BOTH KNEES: ICD-10-CM

## 2023-06-16 DIAGNOSIS — G89.29 CHRONIC PAIN OF RIGHT KNEE: Primary | ICD-10-CM

## 2023-06-16 DIAGNOSIS — M25.561 CHRONIC PAIN OF RIGHT KNEE: Primary | ICD-10-CM

## 2023-06-16 PROCEDURE — 99999 PR PBB SHADOW E&M-EST. PATIENT-LVL III: CPT | Mod: PBBFAC,,, | Performed by: ORTHOPAEDIC SURGERY

## 2023-06-16 PROCEDURE — 99499 NO LOS: ICD-10-PCS | Mod: S$GLB,,, | Performed by: ORTHOPAEDIC SURGERY

## 2023-06-16 PROCEDURE — 99499 UNLISTED E&M SERVICE: CPT | Mod: S$GLB,,, | Performed by: ORTHOPAEDIC SURGERY

## 2023-06-16 PROCEDURE — 99999 PR PBB SHADOW E&M-EST. PATIENT-LVL III: ICD-10-PCS | Mod: PBBFAC,,, | Performed by: ORTHOPAEDIC SURGERY

## 2023-06-16 RX ORDER — TRAMADOL HYDROCHLORIDE 50 MG/1
50 TABLET ORAL EVERY 6 HOURS PRN
Qty: 12 TABLET | Refills: 0 | Status: SHIPPED | OUTPATIENT
Start: 2023-06-16 | End: 2023-10-13

## 2023-06-19 ENCOUNTER — PATIENT MESSAGE (OUTPATIENT)
Dept: SPORTS MEDICINE | Facility: CLINIC | Age: 48
End: 2023-06-19
Payer: COMMERCIAL

## 2023-06-27 RX ORDER — TRIAMCINOLONE ACETONIDE 40 MG/ML
40 INJECTION, SUSPENSION INTRA-ARTICULAR; INTRAMUSCULAR
Status: CANCELLED | OUTPATIENT
Start: 2023-06-27 | End: 2023-06-27

## 2023-06-27 NOTE — PROGRESS NOTES
Patient was checked in at a different facility.  She was not seen on this date and has been rescheduled.

## 2023-06-28 ENCOUNTER — TELEPHONE (OUTPATIENT)
Dept: SPORTS MEDICINE | Facility: CLINIC | Age: 48
End: 2023-06-28
Payer: COMMERCIAL

## 2023-06-28 ENCOUNTER — OFFICE VISIT (OUTPATIENT)
Dept: SPORTS MEDICINE | Facility: CLINIC | Age: 48
End: 2023-06-28
Payer: COMMERCIAL

## 2023-06-28 DIAGNOSIS — M17.0 OSTEOARTHRITIS OF BOTH KNEES: ICD-10-CM

## 2023-06-28 DIAGNOSIS — M25.561 CHRONIC PAIN OF RIGHT KNEE: ICD-10-CM

## 2023-06-28 DIAGNOSIS — G89.29 CHRONIC PAIN OF LEFT KNEE: Primary | ICD-10-CM

## 2023-06-28 DIAGNOSIS — M17.12 PRIMARY OSTEOARTHRITIS OF LEFT KNEE: ICD-10-CM

## 2023-06-28 DIAGNOSIS — M25.562 CHRONIC PAIN OF LEFT KNEE: Primary | ICD-10-CM

## 2023-06-28 DIAGNOSIS — G89.29 CHRONIC PAIN OF RIGHT KNEE: ICD-10-CM

## 2023-06-28 PROCEDURE — 99499 UNLISTED E&M SERVICE: CPT | Mod: S$GLB,,, | Performed by: ORTHOPAEDIC SURGERY

## 2023-06-28 PROCEDURE — 99499 NO LOS: ICD-10-PCS | Mod: S$GLB,,, | Performed by: ORTHOPAEDIC SURGERY

## 2023-06-28 NOTE — TELEPHONE ENCOUNTER
Spoke to the Pt about her appt that was scheduled for today 6/28.  She informed me that she went to the wrong location and that we just needed to cancel her appt . She stated that she was ok with just talking about her L knee at the follow up for her R knee at the later date that she has scheduled.  She stated that if she needs to come in that she would just make the follow up appt in the portal if she needs too. PT confirmed all the other details.

## 2023-07-24 NOTE — PROGRESS NOTES
Telemedicine/Virtual Visit Documentation:      The patient location is at home in [place], using mobile device, safe     The chief complaint leading to consultation is: see HPI     VISIT TYPE X   Virtual visit with synchronous audio and video X   Telephone E/M service         CC: Right knee pain     HPI: Nereida is here today via telemedicine to follow up on her right knee pain. Recall she has right intra-articular knee ACP injection at visit 2023 and is now 6 weeks post-procedure.  She notes improvement in her symptoms for the first 2 weeks following the procedure, but this has worn off and she is now feeling about the same as she had before the procedure.  She denies any new injury or trauma.  She has not been taking any anti-inflammatories.  She denies any new symptoms.    PAST MEDICAL HISTORY:  Past Medical History:   Diagnosis Date    Morbid obesity with BMI of 45.0-49.9, adult        FAMILY HISTORY:  Family History   Problem Relation Age of Onset    Hypertension Mother     Alcohol abuse Father         none    Breast cancer Neg Hx     Ovarian cancer Neg Hx        SURGICAL HISTORY:  Past Surgical History:   Procedure Laterality Date    ARTHROSCOPIC CHONDROPLASTY OF KNEE JOINT Right 2020    Procedure: ARTHROSCOPY, KNEE, WITH CHONDROPLASTY;  Surgeon: Aiden Clarke DO;  Location: [facility] OR;  Service: Orthopedics;  Laterality: Right;     SECTION      KNEE ARTHROSCOPY W/ MENISCECTOMY Right 2020    Procedure: ARTHROSCOPY, KNEE, WITH MENISCECTOMY;  Surgeon: Aiden Clarke DO;  Location: [facility] OR;  Service: Orthopedics;  Laterality: Right;  Equipment: Scope; [employer] Truspar Meniscus Repair System; [employer] chondral drill set  Vendor: [employer]; [place]  Table: Supine    KNEE ARTHROSCOPY W/ PLICA EXCISION Right 2020    Procedure: EXCISION, PLICA, KNEE, ARTHROSCOPIC;  Surgeon: Aiden Clarke DO;  Location: [facility] OR;  Service: Orthopedics;  Laterality: Right;       SOCIAL HISTORY:  Social  History     Socioeconomic History    Marital status: Single   Tobacco Use    Smoking status: Never    Smokeless tobacco: Never   Substance and Sexual Activity    Alcohol use: No    Drug use: No    Sexual activity: Yes     Partners: Male     Birth control/protection: None     Social Determinants of Health     Financial Resource Strain: Low Risk     Difficulty of Paying Living Expenses: Not hard at all   Food Insecurity: No Food Insecurity    Worried About Running Out of Food in the Last Year: Never true    Ran Out of Food in the Last Year: Never true   Transportation Needs: No Transportation Needs    Lack of Transportation (Medical): No    Lack of Transportation (Non-Medical): No   Physical Activity: Sufficiently Active    Days of Exercise per Week: 5 days    Minutes of Exercise per Session: 30 min   Stress: No Stress Concern Present    Feeling of Stress : Not at all   Social Connections: Unknown    Frequency of Communication with Friends and Family: Twice a week    Frequency of Social Gatherings with Friends and Family: Once a week    Active Member of Clubs or Organizations: No    Attends Club or Organization Meetings: Never    Marital Status: Never    Housing Stability: Low Risk     Unable to Pay for Housing in the Last Year: No    Number of Places Lived in the Last Year: 1    Unstable Housing in the Last Year: No       ALLERGIES:  Review of patient's allergies indicates:   Allergen Reactions    Adhesive      Causes skin tearing and redness          PHYSICAL EXAMINATION:  General: In no acute distress, well developed, well nourished, no diaphoresis  Eyes: EOM full and smooth, no eye redness or discharge  HENT: normocephalic and atraumatic, neck supple, trachea midline, no nasal discharge, no external ear redness or discharge  Lungs: respirations non-labored, no conversational dyspnea   Neuro: AAOx3, CN2-12 grossly intact  Skin: No rashes on face or neck, warm and dry  Psychiatric: cooperative, pleasant, mood  and affect appropriate for age    ASSESSMENT:  1. Chronic pain of right knee    2. Primary osteoarthritis of both knees          PLAN:   1-2. Chronic pain of right knee/primary osteoarthritis of both knees - stable    - Nereida is here today via telemedicine to follow up on her right knee pain. Recall she has right intra-articular knee ACP injection at visit 06/16/2023 and is now 6 weeks post-procedure.     - She admits to some improvement for the first few weeks following the procedure, but states that this has worn off.    - Education provided on expectations post injection and that some people may not appreciate significant improvement until the 12 week jake.     - It is appropriate for her to take anti-inflammatories now if needed.  Mobic 15 mg daily as needed prescribed.    - She will be emailed a home exercise program which she is encouraged to do daily.      Future planning includes - physical therapy, bracing, repeat ACP if helpful    All questions were answered to the best of my ability and all concerns were addressed at this time.      This note is dictated using the M*Modal Fluency Direct word recognition program. There are word recognition mistakes that are occasionally missed on review.    Total time spent face-to face with patient counseling or coordinating care including prognosis, differential diagnosis, risks and benefits of treatment, instructions, compliance risk reductions as well as non-face-to-face time personally spent reviewing medial record, medical documentation, and coordination of care.     EST MINUTES X   [id number] 10-19    [id number] 20-29    [id number] 30-39 X   [id number] 40-54    NEW     [id number] 15-29    [id number] 30-44    [id number] 45-59    [id number] 60-74    PHONE      5-10    [id number] 11-20    [id number] 21-30

## 2023-07-28 ENCOUNTER — OFFICE VISIT (OUTPATIENT)
Dept: SPORTS MEDICINE | Facility: CLINIC | Age: 48
End: 2023-07-28
Payer: COMMERCIAL

## 2023-07-28 DIAGNOSIS — G89.29 CHRONIC PAIN OF RIGHT KNEE: ICD-10-CM

## 2023-07-28 DIAGNOSIS — M25.561 CHRONIC PAIN OF RIGHT KNEE: ICD-10-CM

## 2023-07-28 DIAGNOSIS — M17.0 PRIMARY OSTEOARTHRITIS OF BOTH KNEES: ICD-10-CM

## 2023-07-28 PROCEDURE — 99214 OFFICE O/P EST MOD 30 MIN: CPT | Mod: 95,,, | Performed by: ORTHOPAEDIC SURGERY

## 2023-07-28 PROCEDURE — 1159F PR MEDICATION LIST DOCUMENTED IN MEDICAL RECORD: ICD-10-PCS | Mod: CPTII,95,, | Performed by: ORTHOPAEDIC SURGERY

## 2023-07-28 PROCEDURE — 99214 PR OFFICE/OUTPT VISIT, EST, LEVL IV, 30-39 MIN: ICD-10-PCS | Mod: 95,,, | Performed by: ORTHOPAEDIC SURGERY

## 2023-07-28 PROCEDURE — 1159F MED LIST DOCD IN RCRD: CPT | Mod: CPTII,95,, | Performed by: ORTHOPAEDIC SURGERY

## 2023-07-28 PROCEDURE — 1160F RVW MEDS BY RX/DR IN RCRD: CPT | Mod: CPTII,95,, | Performed by: ORTHOPAEDIC SURGERY

## 2023-07-28 PROCEDURE — 1160F PR REVIEW ALL MEDS BY PRESCRIBER/CLIN PHARMACIST DOCUMENTED: ICD-10-PCS | Mod: CPTII,95,, | Performed by: ORTHOPAEDIC SURGERY

## 2023-07-28 RX ORDER — MELOXICAM 15 MG/1
15 TABLET ORAL DAILY
Qty: 30 TABLET | Refills: 0 | Status: SHIPPED | OUTPATIENT
Start: 2023-07-28 | End: 2023-08-24 | Stop reason: SDUPTHER

## 2023-08-02 ENCOUNTER — PATIENT MESSAGE (OUTPATIENT)
Dept: SPORTS MEDICINE | Facility: CLINIC | Age: 48
End: 2023-08-02
Payer: COMMERCIAL

## 2023-08-15 ENCOUNTER — TELEPHONE (OUTPATIENT)
Dept: FAMILY MEDICINE | Facility: CLINIC | Age: 48
End: 2023-08-15
Payer: COMMERCIAL

## 2023-08-15 NOTE — TELEPHONE ENCOUNTER
Left message that Dr. Vega does not prescribe weight loss medication so if the visit scheduled today is for adipex as it says, she will need to cancel and reschedule with another provider that does.

## 2023-08-16 ENCOUNTER — PATIENT MESSAGE (OUTPATIENT)
Dept: PODIATRY | Facility: CLINIC | Age: 48
End: 2023-08-16

## 2023-08-16 ENCOUNTER — OFFICE VISIT (OUTPATIENT)
Dept: PODIATRY | Facility: CLINIC | Age: 48
End: 2023-08-16
Payer: COMMERCIAL

## 2023-08-16 VITALS
BODY MASS INDEX: 46.37 KG/M2 | SYSTOLIC BLOOD PRESSURE: 152 MMHG | WEIGHT: 230 LBS | HEART RATE: 73 BPM | DIASTOLIC BLOOD PRESSURE: 92 MMHG | HEIGHT: 59 IN

## 2023-08-16 DIAGNOSIS — M25.371 ANKLE INSTABILITY, RIGHT: Primary | ICD-10-CM

## 2023-08-16 DIAGNOSIS — Q66.221 METATARSUS ADDUCTUS OF BOTH FEET: ICD-10-CM

## 2023-08-16 DIAGNOSIS — Q66.222 METATARSUS ADDUCTUS OF BOTH FEET: ICD-10-CM

## 2023-08-16 DIAGNOSIS — M19.079 OSTEOARTHRITIS OF ANKLE AND FOOT, UNSPECIFIED LATERALITY: ICD-10-CM

## 2023-08-16 DIAGNOSIS — R60.0 EDEMA OF BOTH LOWER EXTREMITIES: ICD-10-CM

## 2023-08-16 PROCEDURE — 99214 OFFICE O/P EST MOD 30 MIN: CPT | Mod: S$GLB,,, | Performed by: PODIATRIST

## 2023-08-16 PROCEDURE — 3077F PR MOST RECENT SYSTOLIC BLOOD PRESSURE >= 140 MM HG: ICD-10-PCS | Mod: CPTII,S$GLB,, | Performed by: PODIATRIST

## 2023-08-16 PROCEDURE — 99214 PR OFFICE/OUTPT VISIT, EST, LEVL IV, 30-39 MIN: ICD-10-PCS | Mod: S$GLB,,, | Performed by: PODIATRIST

## 2023-08-16 PROCEDURE — 1159F MED LIST DOCD IN RCRD: CPT | Mod: CPTII,S$GLB,, | Performed by: PODIATRIST

## 2023-08-16 PROCEDURE — 1160F RVW MEDS BY RX/DR IN RCRD: CPT | Mod: CPTII,S$GLB,, | Performed by: PODIATRIST

## 2023-08-16 PROCEDURE — 1160F PR REVIEW ALL MEDS BY PRESCRIBER/CLIN PHARMACIST DOCUMENTED: ICD-10-PCS | Mod: CPTII,S$GLB,, | Performed by: PODIATRIST

## 2023-08-16 PROCEDURE — 99999 PR PBB SHADOW E&M-EST. PATIENT-LVL III: ICD-10-PCS | Mod: PBBFAC,,, | Performed by: PODIATRIST

## 2023-08-16 PROCEDURE — 3077F SYST BP >= 140 MM HG: CPT | Mod: CPTII,S$GLB,, | Performed by: PODIATRIST

## 2023-08-16 PROCEDURE — 3008F PR BODY MASS INDEX (BMI) DOCUMENTED: ICD-10-PCS | Mod: CPTII,S$GLB,, | Performed by: PODIATRIST

## 2023-08-16 PROCEDURE — 3080F DIAST BP >= 90 MM HG: CPT | Mod: CPTII,S$GLB,, | Performed by: PODIATRIST

## 2023-08-16 PROCEDURE — 3008F BODY MASS INDEX DOCD: CPT | Mod: CPTII,S$GLB,, | Performed by: PODIATRIST

## 2023-08-16 PROCEDURE — 3080F PR MOST RECENT DIASTOLIC BLOOD PRESSURE >= 90 MM HG: ICD-10-PCS | Mod: CPTII,S$GLB,, | Performed by: PODIATRIST

## 2023-08-16 PROCEDURE — 99999 PR PBB SHADOW E&M-EST. PATIENT-LVL III: CPT | Mod: PBBFAC,,, | Performed by: PODIATRIST

## 2023-08-16 PROCEDURE — 1159F PR MEDICATION LIST DOCUMENTED IN MEDICAL RECORD: ICD-10-PCS | Mod: CPTII,S$GLB,, | Performed by: PODIATRIST

## 2023-08-16 NOTE — PROGRESS NOTES
Subjective:      Patient ID: Nereida Narayanan is a 48 y.o. female.    Chief Complaint:   Foot Swelling (Bilateral ) and Foot Pain (Sharp aching/Bilateral )  Pt new to me.    Nereida is a 48 y.o. female who presents to the podiatry clinic  with complaint of  bilateral foot pain and swelling. Onset of the symptoms was several years ago  Left is worse than right    She is currently taking Mobic that was restarted by sports medicine for her knee after her injection as well as Lasix    Relates that she switch shoes which helped she also use some Dr. Harden's of the recommendation of the podiatrist.  Soaking her legs in Epsom salt.  She tried some compression stockings but she was unable to tolerate them      Started seeing podiatry November 2022:    Chief Complaint: Foot Swelling, Foot Pain, and Leg Problem  Patient presents with complaint of right foot pain and swelling for almost 2 years.  She does relate it is present on the left side but not as severe.  Relates most of the time she is able to deal with the discomfort, however she started to experience a significant amount of sharp shooting pain from the foot to the knee.  Since this has been present for such a long time she is not sure of the initial cause, possibly started after right knee surgery which was 2 years ago, pain has progressed gradually, no foot injury.  Relates wearing comfortable tennis shoes, on her feet a lot.  Pain level 8/10    1. Neuritis of right foot    2. Edema of both lower extremities    3. Tarsal tunnel syndrome, bilateral    4. Sural neuritis, unspecified laterality    5. Neuritis of left foot        Revisit 1/26/2023:        1. Osteoarthritis of ankle and foot, unspecified laterality    2. Left foot pain        Plan:          Patient presents today she is complaining of left foot pain and swelling she relates no trauma or injury to the area she states that it has gotten progressively worse over the past 4-5 months.  Patient relates the  last steroid shot she got helped her for about 2 months.  On evaluation I advised the patient we need to get an x-ray of the left foot she likely has some degenerative spurring on the dorsal midfoot which is causing the area to become inflamed and tender she may even have a cyst in the area I made the patient aware that she needs to make sure she has appropriate support at all times and she should not wear shoes that are too tight or squeeze the top of her foot I did dispense the patient some power step full length arch supports I have started the patient on diclofenac I ordered an x-ray that will take prior to her next visit and appointment I plan to see the patient for follow-up in several weeks and will re-evaluate how she is doing I did hold off on any additional therapeutic injections at this time until we see how she is doing with the arch support.  Patient was in understanding and agreement with everything discussed I explained to the patient she needs to have good support at all times especially the amount of time that she is on her feet.  Total time including discussion evaluation treatment discussion of treatment options treatment plan dispensing and fitting the patient for power step full length arch supports and evaluation equaled 30 minutes.     X-Ray Foot Complete Left  Narrative: EXAMINATION:  XR FOOT COMPLETE 3 VIEW LEFT    CLINICAL HISTORY:  .  Primary osteoarthritis, unspecified ankle and foot    TECHNIQUE:  AP, lateral and oblique views of the left foot were performed.    COMPARISON:  None    FINDINGS:  There is a large plantar calcaneal spur.  There is collapse of the longitudinal plantar arch suggesting underlying PTT/spring ligament dysfunction.  There is degenerative change of the talonavicular joint.  No erosions.  There are hammertoe deformities.  No radiographically evident acute fracture or osseous destructive process.  Impression: As above    Electronically signed by: Eliel Talamantes  MD  Date:    2023  Time:    16:15     Past Medical History:   Diagnosis Date    Morbid obesity with BMI of 45.0-49.9, adult      Past Surgical History:   Procedure Laterality Date    ARTHROSCOPIC CHONDROPLASTY OF KNEE JOINT Right 2020    Procedure: ARTHROSCOPY, KNEE, WITH CHONDROPLASTY;  Surgeon: Aiden [place] DO;  Location: [facility] OR;  Service: Orthopedics;  Laterality: Right;     SECTION      KNEE ARTHROSCOPY W/ MENISCECTOMY Right 2020    Procedure: ARTHROSCOPY, KNEE, WITH MENISCECTOMY;  Surgeon: Aiden [place] DO;  Location: [facility] OR;  Service: Orthopedics;  Laterality: Right;  Equipment: Scope; [employer]par Meniscus Repair System; [place] chondral drill set  Vendor: [employer]; [employer]  Table: Supine    KNEE ARTHROSCOPY W/ PLICA EXCISION Right 2020    Procedure: EXCISION, PLICA, KNEE, ARTHROSCOPIC;  Surgeon: Aiden [place] DO;  Location: [facility] OR;  Service: Orthopedics;  Laterality: Right;     Current Outpatient Medications on File Prior to Visit   Medication Sig Dispense Refill    furosemide (LASIX) 40 MG tablet Take 1 tablet (40 mg total) by mouth once daily. 30 tablet 3    meloxicam (MOBIC) 15 MG tablet Take 1 tablet (15 mg total) by mouth once daily. 30 tablet 0    topiramate (TOPAMAX) 15 MG capsule Take 1 capsule (15 mg total) by mouth once daily. 30 capsule 3    traMADoL (ULTRAM) 50 mg tablet Take 1 tablet (50 mg total) by mouth every 6 (six) hours as needed for Pain. 12 tablet 0     No current facility-administered medications on file prior to visit.     Review of patient's allergies indicates:   Allergen Reactions    Adhesive      Causes skin tearing and redness        Review of Systems   Constitutional: Negative for chills, decreased appetite, fever, malaise/fatigue, night sweats, weight gain and weight loss.   Cardiovascular:  Positive for leg swelling. Negative for chest pain, claudication, dyspnea on exertion, palpitations and syncope.   Respiratory:  Negative for cough  "and shortness of breath.    Endocrine: Negative for cold intolerance and heat intolerance.   Hematologic/Lymphatic: Negative for bleeding problem. Does not bruise/bleed easily.   Skin:  Negative for color change, dry skin, flushing, itching, nail changes, poor wound healing, rash, skin cancer, suspicious lesions and unusual hair distribution.   Musculoskeletal:  Negative for arthritis, back pain, falls, gout, joint pain, joint swelling, muscle cramps, muscle weakness, myalgias, neck pain and stiffness.        + foot and leg pains   Gastrointestinal:  Negative for diarrhea, nausea and vomiting.   Neurological:  Positive for paresthesias. Negative for dizziness, focal weakness, light-headedness, numbness, tremors, vertigo and weakness.   Psychiatric/Behavioral:  Negative for altered mental status and depression. The patient does not have insomnia.    Allergic/Immunologic: Negative.            Objective:       Vitals:    23 1459   BP: (!) 152/92   Pulse: 73   Weight: 104.3 kg (230 lb)   Height: 4' 11" (1.499 m)   PainSc:   8   PainLoc: Foot   104.3 kg (230 lb)     Physical Exam  Vitals reviewed.   Constitutional:       General: She is not in acute distress.     Appearance: She is well-developed. She is not ill-appearing, toxic-appearing or diaphoretic.      Comments: Proper supportive shoegear      Cardiovascular:      Pulses:           Dorsalis pedis pulses are 2+ on the right side and 2+ on the left side.        Posterior tibial pulses are 2+ on the right side and 2+ on the left side.   Musculoskeletal:         General: No tenderness or deformity.      Right lower le+ Edema present.      Left lower le+ Edema present.      Right ankle: Normal.      Right Achilles Tendon: Normal.      Left ankle: Normal.      Left Achilles Tendon: Normal.      Right foot: Decreased range of motion. No deformity.      Left foot: Decreased range of motion. No deformity.      Comments: MT adductus  No pop or with rom    + " mild pain to anterior ankles with stance       Feet:      Right foot:      Protective Sensation: 10 sites tested.  6 sites sensed.      Skin integrity: No ulcer, blister, skin breakdown, erythema, warmth, callus or dry skin.      Toenail Condition: Right toenails are normal.      Left foot:      Protective Sensation: 10 sites tested.  6 sites sensed.      Skin integrity: No ulcer, blister, skin breakdown, erythema, warmth, callus or dry skin.      Toenail Condition: Left toenails are normal.      Comments: Decrease swm    Skin:     General: Skin is warm.      Capillary Refill: Capillary refill takes 2 to 3 seconds.      Coloration: Skin is not pale.      Findings: No erythema or rash.   Neurological:      Mental Status: She is alert and oriented to person, place, and time.      Sensory: No sensory deficit.      Gait: Gait is intact.   Psychiatric:         Attention and Perception: Attention normal.         Mood and Affect: Mood normal.         Speech: Speech normal.         Behavior: Behavior normal.         Thought Content: Thought content normal.         Cognition and Memory: Cognition normal.         Judgment: Judgment normal.           2020  Narrative & Impression  EXAMINATION:  US ARTERIAL LOWER EXTREMITY BILAT WITH CRIS (XPD)     CLINICAL HISTORY:  Localized edema     TECHNIQUE:  Duplex Doppler ultrasound evaluation of the arteries of the right lower and left lower extremities     COMPARISON:  None     FINDINGS:  Right lower extremity; waveform is triphasic.  There are no elevated peak systolic velocities suggesting hemodynamically significant narrowing in the visualized arteries of right lower extremity. Plaque appears mild on images.  Peroneal artery not visualized.     Left lower extremity; waveform is triphasic.  There are no elevated peak systolic velocity suggesting hemodynamically significant narrowing in the visualized arteries of the left lower extremity. Plaque appears mild.  Peroneal artery not  visualized.     Peak systolic pressure right arm 115, right posterior tibial artery 140, right dorsalis pedis artery 128     Peak systolic pressure left arm 110, left posterior tibial artery 148, left dorsalis pedis artery 143     Right CRIS 1.2     Left CRIS 1.3     Impression:     No elevated peak systolic velocities in visualized arteries of right or left lower extremity suggesting hemodynamically significant narrowing.  Peroneal arteries not visualized     Normal ABIs bilaterally.           Assessment:       Encounter Diagnoses   Name Primary?    Ankle instability, right Yes    Metatarsus adductus of both feet     Osteoarthritis of ankle and foot, unspecified laterality     Edema of both lower extremities          Plan:       Nereida was seen today for foot swelling and foot pain.    Diagnoses and all orders for this visit:    Ankle instability, right  -     HME - OTHER    Metatarsus adductus of both feet  -     HME - OTHER    Osteoarthritis of ankle and foot, unspecified laterality    Edema of both lower extremities      I counseled the patient on her conditions, their implications and medical management.    Patient has had history normal ABIs as well as a normal ultrasound    Extensive chart review    Patient does have metatarsus adductus deformities which are likely contributing to her foot ankle pain discussed with patient to ask her parents if she had any foot deformities at birth that were not corrected    Patient also has some decreased Brandon Michael monofilament  Discussed with patient to consider further workup for diabetes or consider rheumatology consult for arthrities    Patient needs to establish a primary care in town    Recommend trying a right ankle brace to stabilize ankle during work    Patient can consider  Physical therapy however failed in the past    Patient will update with symptoms; unclear etiolgy          No follow-ups on file.

## 2023-08-21 ENCOUNTER — TELEPHONE (OUTPATIENT)
Dept: PRIMARY CARE CLINIC | Facility: CLINIC | Age: 48
End: 2023-08-21
Payer: COMMERCIAL

## 2023-08-21 ENCOUNTER — TELEPHONE (OUTPATIENT)
Dept: FAMILY MEDICINE | Facility: CLINIC | Age: 48
End: 2023-08-21
Payer: COMMERCIAL

## 2023-08-21 NOTE — TELEPHONE ENCOUNTER
----- Message from Miryam Lee sent at 8/21/2023  9:31 AM CDT -----  Who Called: Pt    What is the request in detail: Requesting call back to discuss scheduling appt with provider to get blood work done, to check if pt is diabetic. Pt has established care with a [place] provider, but lives in [place] and wants to have someone closer. Please advise    Can the clinic reply by [facility]? No    Best Call Back Number: [phone]      Additional Information:

## 2023-08-21 NOTE — TELEPHONE ENCOUNTER
----- Message from Caridad Maruqis MA sent at 8/21/2023  1:13 PM CDT -----    ----- Message -----  From: Miryam Lee  Sent: 8/21/2023   9:35 AM CDT  To: [facility] Clinical Staff; #    Who Called: Pt    What is the request in detail: Requesting call back to discuss scheduling appt with provider to get blood work done, to check if pt is diabetic. Pt has established care with a [place] provider, but lives in [place] and wants to have someone closer. Please advise    Can the clinic reply by [facility]? No    Best Call Back Number: [phone]      Additional Information:

## 2023-08-22 ENCOUNTER — TELEPHONE (OUTPATIENT)
Dept: INTERNAL MEDICINE | Facility: CLINIC | Age: 48
End: 2023-08-22
Payer: COMMERCIAL

## 2023-08-22 NOTE — TELEPHONE ENCOUNTER
Patient calling to schedule a Ext. Care with a provider.  Unable to schedule patient .  Gave number to the [facility].  Patient states that she will give them a call to schedule appt.  Instructed to call the office for any further questions or concerns.

## 2023-08-24 ENCOUNTER — LAB VISIT (OUTPATIENT)
Dept: LAB | Facility: HOSPITAL | Age: 48
End: 2023-08-24
Attending: FAMILY MEDICINE
Payer: COMMERCIAL

## 2023-08-24 ENCOUNTER — OFFICE VISIT (OUTPATIENT)
Dept: FAMILY MEDICINE | Facility: CLINIC | Age: 48
End: 2023-08-24
Payer: COMMERCIAL

## 2023-08-24 VITALS
DIASTOLIC BLOOD PRESSURE: 84 MMHG | WEIGHT: 228.19 LBS | OXYGEN SATURATION: 98 % | TEMPERATURE: 98 F | BODY MASS INDEX: 46 KG/M2 | HEIGHT: 59 IN | SYSTOLIC BLOOD PRESSURE: 142 MMHG | HEART RATE: 81 BPM

## 2023-08-24 DIAGNOSIS — M25.562 ARTHRALGIA OF BOTH KNEES: ICD-10-CM

## 2023-08-24 DIAGNOSIS — E78.5 HYPERLIPIDEMIA, UNSPECIFIED HYPERLIPIDEMIA TYPE: Primary | ICD-10-CM

## 2023-08-24 DIAGNOSIS — R60.0 PEDAL EDEMA: ICD-10-CM

## 2023-08-24 DIAGNOSIS — E78.5 HYPERLIPIDEMIA, UNSPECIFIED HYPERLIPIDEMIA TYPE: ICD-10-CM

## 2023-08-24 DIAGNOSIS — Z12.11 COLON CANCER SCREENING: ICD-10-CM

## 2023-08-24 DIAGNOSIS — E66.01 MORBID OBESITY: ICD-10-CM

## 2023-08-24 DIAGNOSIS — M25.561 ARTHRALGIA OF BOTH KNEES: ICD-10-CM

## 2023-08-24 LAB
ALBUMIN SERPL BCP-MCNC: 3.7 G/DL (ref 3.5–5.2)
ALP SERPL-CCNC: 54 U/L (ref 55–135)
ALT SERPL W/O P-5'-P-CCNC: 32 U/L (ref 10–44)
ANION GAP SERPL CALC-SCNC: 4 MMOL/L (ref 8–16)
AST SERPL-CCNC: 25 U/L (ref 10–40)
BASOPHILS # BLD AUTO: 0.06 K/UL (ref 0–0.2)
BASOPHILS NFR BLD: 0.8 % (ref 0–1.9)
BILIRUB SERPL-MCNC: 0.5 MG/DL (ref 0.1–1)
BUN SERPL-MCNC: 15 MG/DL (ref 6–20)
CALCIUM SERPL-MCNC: 9.2 MG/DL (ref 8.7–10.5)
CHLORIDE SERPL-SCNC: 104 MMOL/L (ref 95–110)
CO2 SERPL-SCNC: 24 MMOL/L (ref 23–29)
CREAT SERPL-MCNC: 0.6 MG/DL (ref 0.5–1.4)
CRP SERPL-MCNC: 1.29 MG/DL
DIFFERENTIAL METHOD: ABNORMAL
EOSINOPHIL # BLD AUTO: 0.2 K/UL (ref 0–0.5)
EOSINOPHIL NFR BLD: 2.8 % (ref 0–8)
ERYTHROCYTE [DISTWIDTH] IN BLOOD BY AUTOMATED COUNT: 14.6 % (ref 11.5–14.5)
ERYTHROCYTE [SEDIMENTATION RATE] IN BLOOD BY WESTERGREN METHOD: 32 MM/HR (ref 0–20)
EST. GFR  (NO RACE VARIABLE): >60 ML/MIN/1.73 M^2
GLUCOSE SERPL-MCNC: 87 MG/DL (ref 70–110)
HCT VFR BLD AUTO: 36.2 % (ref 37–48.5)
HGB BLD-MCNC: 11.5 G/DL (ref 12–16)
IMM GRANULOCYTES # BLD AUTO: 0.08 K/UL (ref 0–0.04)
IMM GRANULOCYTES NFR BLD AUTO: 1 % (ref 0–0.5)
LYMPHOCYTES # BLD AUTO: 2.3 K/UL (ref 1–4.8)
LYMPHOCYTES NFR BLD: 29.4 % (ref 18–48)
MCH RBC QN AUTO: 27.2 PG (ref 27–31)
MCHC RBC AUTO-ENTMCNC: 31.8 G/DL (ref 32–36)
MCV RBC AUTO: 86 FL (ref 82–98)
MONOCYTES # BLD AUTO: 0.6 K/UL (ref 0.3–1)
MONOCYTES NFR BLD: 8 % (ref 4–15)
NEUTROPHILS # BLD AUTO: 4.5 K/UL (ref 1.8–7.7)
NEUTROPHILS NFR BLD: 58 % (ref 38–73)
NRBC BLD-RTO: 0 /100 WBC
PLATELET # BLD AUTO: 289 K/UL (ref 150–450)
PMV BLD AUTO: 10.3 FL (ref 9.2–12.9)
POTASSIUM SERPL-SCNC: 3.8 MMOL/L (ref 3.5–5.1)
PROT SERPL-MCNC: 7.4 G/DL (ref 6–8.4)
RBC # BLD AUTO: 4.23 M/UL (ref 4–5.4)
SODIUM SERPL-SCNC: 132 MMOL/L (ref 136–145)
TSH SERPL DL<=0.005 MIU/L-ACNC: 2.89 UIU/ML (ref 0.34–5.6)
WBC # BLD AUTO: 7.79 K/UL (ref 3.9–12.7)

## 2023-08-24 PROCEDURE — 1160F RVW MEDS BY RX/DR IN RCRD: CPT | Mod: CPTII,S$GLB,, | Performed by: FAMILY MEDICINE

## 2023-08-24 PROCEDURE — 3008F BODY MASS INDEX DOCD: CPT | Mod: CPTII,S$GLB,, | Performed by: FAMILY MEDICINE

## 2023-08-24 PROCEDURE — 86431 RHEUMATOID FACTOR QUANT: CPT | Performed by: FAMILY MEDICINE

## 2023-08-24 PROCEDURE — 86140 C-REACTIVE PROTEIN: CPT | Performed by: FAMILY MEDICINE

## 2023-08-24 PROCEDURE — 3077F SYST BP >= 140 MM HG: CPT | Mod: CPTII,S$GLB,, | Performed by: FAMILY MEDICINE

## 2023-08-24 PROCEDURE — 1159F PR MEDICATION LIST DOCUMENTED IN MEDICAL RECORD: ICD-10-PCS | Mod: CPTII,S$GLB,, | Performed by: FAMILY MEDICINE

## 2023-08-24 PROCEDURE — 1160F PR REVIEW ALL MEDS BY PRESCRIBER/CLIN PHARMACIST DOCUMENTED: ICD-10-PCS | Mod: CPTII,S$GLB,, | Performed by: FAMILY MEDICINE

## 2023-08-24 PROCEDURE — 99214 PR OFFICE/OUTPT VISIT, EST, LEVL IV, 30-39 MIN: ICD-10-PCS | Mod: S$GLB,,, | Performed by: FAMILY MEDICINE

## 2023-08-24 PROCEDURE — 84443 ASSAY THYROID STIM HORMONE: CPT | Performed by: FAMILY MEDICINE

## 2023-08-24 PROCEDURE — 3077F PR MOST RECENT SYSTOLIC BLOOD PRESSURE >= 140 MM HG: ICD-10-PCS | Mod: CPTII,S$GLB,, | Performed by: FAMILY MEDICINE

## 2023-08-24 PROCEDURE — 80053 COMPREHEN METABOLIC PANEL: CPT | Performed by: FAMILY MEDICINE

## 2023-08-24 PROCEDURE — 1159F MED LIST DOCD IN RCRD: CPT | Mod: CPTII,S$GLB,, | Performed by: FAMILY MEDICINE

## 2023-08-24 PROCEDURE — 85651 RBC SED RATE NONAUTOMATED: CPT | Performed by: FAMILY MEDICINE

## 2023-08-24 PROCEDURE — 3079F PR MOST RECENT DIASTOLIC BLOOD PRESSURE 80-89 MM HG: ICD-10-PCS | Mod: CPTII,S$GLB,, | Performed by: FAMILY MEDICINE

## 2023-08-24 PROCEDURE — 36415 COLL VENOUS BLD VENIPUNCTURE: CPT | Performed by: FAMILY MEDICINE

## 2023-08-24 PROCEDURE — 3079F DIAST BP 80-89 MM HG: CPT | Mod: CPTII,S$GLB,, | Performed by: FAMILY MEDICINE

## 2023-08-24 PROCEDURE — 99214 OFFICE O/P EST MOD 30 MIN: CPT | Mod: S$GLB,,, | Performed by: FAMILY MEDICINE

## 2023-08-24 PROCEDURE — 86038 ANTINUCLEAR ANTIBODIES: CPT | Performed by: FAMILY MEDICINE

## 2023-08-24 PROCEDURE — 3008F PR BODY MASS INDEX (BMI) DOCUMENTED: ICD-10-PCS | Mod: CPTII,S$GLB,, | Performed by: FAMILY MEDICINE

## 2023-08-24 PROCEDURE — 85025 COMPLETE CBC W/AUTO DIFF WBC: CPT | Performed by: FAMILY MEDICINE

## 2023-08-24 RX ORDER — MELOXICAM 15 MG/1
15 TABLET ORAL DAILY
Qty: 30 TABLET | Refills: 5 | Status: SHIPPED | OUTPATIENT
Start: 2023-08-24 | End: 2023-10-04 | Stop reason: SDUPTHER

## 2023-08-25 ENCOUNTER — PATIENT MESSAGE (OUTPATIENT)
Dept: FAMILY MEDICINE | Facility: CLINIC | Age: 48
End: 2023-08-25
Payer: COMMERCIAL

## 2023-08-25 LAB
ANA TITR SER IF: NEGATIVE {TITER}
RHEUMATOID FACT SERPL-ACNC: <10 IU/ML

## 2023-08-25 NOTE — PROGRESS NOTES
Subjective:       Patient ID: Nereida Narayanan is a 48 y.o. female.    Chief Complaint: Follow-up    Some edema of the ankles.  Going on while.  Some knee pain.  On Lasix daily.  No potassium replacement.  Family history is positive for hypertension.  Past surgical history some right knee surgery had a scope.  Now having left knee pain.      Family history is negative for colon cancer.  He does have a BMI 46 morbidly obese.  Arthralgia of the knee.  Hypertension cholesterol 233 HDL 63 .  Colon cancer screening has Cologuard but did not understand directions.    Physical examination.  Vital signs noted.  Neck bruit.  Chest clear.  Heart regular rate rhythm.        Objective:        Assessment:       1. Hyperlipidemia, unspecified hyperlipidemia type    2. Pedal edema    3. Morbid obesity    4. BMI 45.0-49.9, adult    5. Colon cancer screening    6. Arthralgia of both knees        Plan:       Hyperlipidemia, unspecified hyperlipidemia type  -     CBC Auto Differential; Future; Expected date: 08/24/2023  -     Comprehensive Metabolic Panel; Future; Expected date: 08/24/2023    Pedal edema    Morbid obesity  -     TSH; Future; Expected date: 08/24/2023    BMI 45.0-49.9, adult    Colon cancer screening    Arthralgia of both knees  -     Sedimentation rate; Future; Expected date: 08/24/2023  -     C-Reactive Protein; Future; Expected date: 08/24/2023  -     YOAN Screen w/Reflex; Future; Expected date: 08/24/2023  -     Rheumatoid Factor; Future; Expected date: 08/24/2023    Other orders  -     meloxicam (MOBIC) 15 MG tablet; Take 1 tablet (15 mg total) by mouth once daily.  Dispense: 30 tablet; Refill: 5    Mammogram ordered.  CBC CMP TSH sed rate CRP YOAN rheumatoid factor.  Do Cologuard.  Treat further after we have the back.  Prescription for Mobic 15 mg.

## 2023-08-31 NOTE — PROGRESS NOTES
CC: bilateral knee pain    Nereida is here today for follow up evaluation of her bilateral knee pain. Patient reports her pain is 8/10 today. Recall she had right knee intra-articular ACP injection 11 weeks ago and states her pain is improved but not resolved. She states her left knee pain has been severe and admits to feeling swollen bilaterally today. She denies new falls or trauma since her last visit. She would like to discuss possible injection options today.     Recall from visit on 2023  48 y.o. Female presents today for evaluation of her bilateral R>L knee pain. The pain is located mostly medial of bilateral knees. Patient does have plans to be seen in bariatric clinic in  for evaluation of potential surgery. She is interested in any treatment options that could help the pain.  How long: Multiple year history  What makes it better: advil, aleve, heat  What makes it worse: exertion and weightbearing  Does it radiate: No  Attempted treatments: Recently received bilateral triamcinolone intra-articular steroid injections to knees in 2022 with 2 weeks of partial relief. Also tried PT in  with limited relief.  Pain score: Currently 5/10 which for her is low. Can be 8/10 at times  Any mechanical symptoms: Denies  Feelings of instability: Denies  Affect on ADLs: She is not as active as she wishes due to knee pain.    Occupation: Works at [employer]    PAST MEDICAL HISTORY:   Past Medical History:   Diagnosis Date    Morbid obesity with BMI of 45.0-49.9, adult        PAST SURGICAL HISTORY:   Past Surgical History:   Procedure Laterality Date    ARTHROSCOPIC CHONDROPLASTY OF KNEE JOINT Right 2020    Procedure: ARTHROSCOPY, KNEE, WITH CHONDROPLASTY;  Surgeon: Aiden Clarke DO;  Location: [facility] OR;  Service: Orthopedics;  Laterality: Right;     SECTION      KNEE ARTHROSCOPY W/ MENISCECTOMY Right 2020    Procedure: ARTHROSCOPY, KNEE, WITH MENISCECTOMY;  Surgeon: Aiden Clarke  ;  Location: [facility] OR;  Service: Orthopedics;  Laterality: Right;  Equipment: Scope; [employer] Truspar Meniscus Repair System; Maria Eugenia chondral drill set  Vendor: [employer]; [employer]  Table: Supine    KNEE ARTHROSCOPY W/ PLICA EXCISION Right 9/9/2020    Procedure: EXCISION, PLICA, KNEE, ARTHROSCOPIC;  Surgeon: Aiden Clarke DO;  Location: [facility] OR;  Service: Orthopedics;  Laterality: Right;       FAMILY HISTORY:   Family History   Problem Relation Age of Onset    Hypertension Mother     Alcohol abuse Father         none    Breast cancer Neg Hx     Ovarian cancer Neg Hx        SOCIAL HISTORY:   Social History     Socioeconomic History    Marital status: Single   Tobacco Use    Smoking status: Never    Smokeless tobacco: Never   Substance and Sexual Activity    Alcohol use: No    Drug use: No    Sexual activity: Yes     Partners: Male     Birth control/protection: None     Social Determinants of Health     Financial Resource Strain: Low Risk  (9/1/2023)    Overall Financial Resource Strain (CARDIA)     Difficulty of Paying Living Expenses: Not hard at all   Food Insecurity: No Food Insecurity (9/1/2023)    Hunger Vital Sign     Worried About Running Out of Food in the Last Year: Never true     Ran Out of Food in the Last Year: Never true   Transportation Needs: No Transportation Needs (9/1/2023)    PRAPARE - Transportation     Lack of Transportation (Medical): No     Lack of Transportation (Non-Medical): No   Physical Activity: Insufficiently Active (9/1/2023)    Exercise Vital Sign     Days of Exercise per Week: 4 days     Minutes of Exercise per Session: 20 min   Stress: No Stress Concern Present (9/1/2023)    [employer] - Occupational Stress Questionnaire     Feeling of Stress : Not at all   Social Connections: Unknown (9/1/2023)    Social Connection and Isolation Panel [NHANES]     Frequency of Communication with Friends and Family: Three times a week     Frequency of Social Gatherings  "with Friends and Family: Once a week     Active Member of Clubs or Organizations: No     Attends Club or Organization Meetings: Never     Marital Status: Never    Housing Stability: Low Risk  (9/1/2023)    Housing Stability Vital Sign     Unable to Pay for Housing in the Last Year: No     Number of Places Lived in the Last Year: 1     Unstable Housing in the Last Year: No       MEDICATIONS:     Current Outpatient Medications:     furosemide (LASIX) 40 MG tablet, Take 1 tablet (40 mg total) by mouth once daily., Disp: 30 tablet, Rfl: 3    meloxicam (MOBIC) 15 MG tablet, Take 1 tablet (15 mg total) by mouth once daily., Disp: 30 tablet, Rfl: 5    traMADoL (ULTRAM) 50 mg tablet, Take 1 tablet (50 mg total) by mouth every 6 (six) hours as needed for Pain., Disp: 12 tablet, Rfl: 0    ALLERGIES:   Review of patient's allergies indicates:   Allergen Reactions    Adhesive      Causes skin tearing and redness         PHYSICAL EXAMINATION:  BP (!) 153/83   Pulse 89   Ht 4' 11" (1.499 m)   Wt 104.8 kg (231 lb 0.7 oz)   BMI 46.66 kg/m²   Vitals signs and nursing note have been reviewed.  General: In no acute distress, well developed, well nourished, no diaphoresis  Eyes: EOM full and smooth, no eye redness or discharge  HENT: normocephalic and atraumatic, neck supple, trachea midline, no nasal discharge, no external ear redness or discharge  Cardiovascular: 2+ and symmetric DP pulses bilaterally, no LE edema  Lungs: respirations non-labored, no conversational dyspnea   Abd: non-distended, no rigidity  MSK: no amputation or deformity, no swelling of extremities  Neuro: AAOx3, CN2-12 grossly intact  Skin: No rashes, warm and dry  Psychiatric: cooperative, pleasant, mood and affect appropriate for age    MUSCULOSKELETAL EXAM:    BILATERAL KNEE EXAMINATION   Affected side is compared to contralateral knee     Observation:  No edema, erythema, ecchymosis noted.  Suprapatellar effusion on right  No muscle atrophy of the " thighs and calves noted.  No obvious bony deformities noted.   No genu valgus/varum noted. No recurvatum noted.      Tenderness:  Patella - none    Lateral joint line - none  Quad tendon - none   Medial joint line - + b/l  Patellar tendon - none   Medial plica - none  Tibial tubercle - none   Lateral plica - none  Pes anserine - + b/l   MCL prox - none  Distal ITB - none   MCL distal - none  MFC - none    LCL prox - none  LFC - none    LCL distal - none  Tibia - none    Fibula - none    No obvious bursae, plicae, popliteal cysts, or tendon derangement palpated.          ROM:   Active extension to 0° on left without hyperextension, lag, or patellar J sign.   Active extension to 0° on right without hyperextension, lag, or patellar J sign.   Slight crepitus with extension bilaterally  Active flexion to 120° on left and 120° on right limited by soft tissue.    Strength: (bilaterally)  Knee Flexion - 5/5 on left and 5/5 on right  Knee Extension - 5/5 on left and 5/5 on right  Hip Flexion - 5/5 on left and 5/5 on right  Hip Extension - 5/5 on left and 5/5 on right  Ankle dorsiflexion - 5/5 on left and 5/5 on right  Ankle Plantarflexion - 5/5 on left and 5/5 on right    Patellofemoral Exam:  Patellar ballottement - negative  Bulge sign - negative  Patellar grind - negative on left, positive on right  No patellar laxity with medial and lateral translation   No apprehension with medial and lateral patellar translation.     Meniscus Testing:     No pain with terminal extension and flexion.  Shashas test - negative   Bounce home test - negative    Ligament Testing:  Lachman's test - negative  No laxity with varus testing at 0 and 30 degrees.  No laxity with valgus testing at 0 and 30 degrees.    Neurovascular Examination:   Antalgic gait  Sensation intact to light touch in the obturator, lateral/intermediate/medial/posterior femoral cutaneous, saphenous, and common peroneal nerves bilaterally.  Pulses intact at the DP and  "PT arteries bilaterally.    Capillary refill intact <2 seconds in all toes bilaterally.      IMAGIN. X-ray obtained on 2022 due to bilateral knee pain  2. X-ray images were reviewed personally by me and then directly with patient.  3. FINDINGS: X-ray images obtained demonstrate tricompartmental degenerative changes, most pronounced in the medial compartment bilaterally.  Kellgren and Clay grade 3  4. IMPRESSION: As above.       PROCEDURE:  Large Joint Aspiration/Injection  Knee joint, bilateral  Performed by: PEYMAN MCGUIRE  Authorized by: PEYMAN MCGUIRE  Consent Done?: Yes (Verbal and written)  Indications: Pain and joint effusion  Site marked: The procedure site was marked   Timeout: Prior to procedure the correct patient, procedure, and site was verified   Location: Knee joint, bilateral  Prep: Patient was prepped with Chlorhexidine and alcohol.  Ultrasound Guidance for needle placement: yes    Procedure RIGHT: Ethyl Chloride spray was used prior to skin puncture. After cold spray was applied, 2-4 cc's of 0.2% ropivacaine was injected into the skin and superficial tissue at the injection site using a 25G, 1.5" needle to form an anesthetic tunnel and ensure proper placement of the needle into the joint space. After local anesthetic was applied an 18G, 1.5 needle was used to enter the knee joint capsule under US guidance. 12 cc of clear synovial fluid was aspirated. Following aspiration, a 3 cc mixture of 1 cc of 40 mg/ml triamcinolone acetonide and 2 cc of 0.2% ropivacaine were injected into the knee joint.     Procedure LEFT: Ethyl Chloride spray was used prior to skin puncture. After cold spray was applied, 2-4 cc's of 0.2% ropivacaine was injected into the skin and superficial tissue at the injection site using a 25G, 1.5" needle to form an anesthetic tunnel and ensure proper placement of the needle into the joint space. After local anesthetic was applied an 18G, 1.5 needle was used to " enter the knee joint capsule under US guidance. 5 cc of clear synovial fluid was aspirated. Following aspiration, a 3 cc mixture of 1 cc of 40 mg/ml triamcinolone acetonide and 2 cc of 0.2% ropivacaine were injected into the knee joint.     Approach: superiorlateral  Medications: 40 mg triamcinolone acetonide 40 mg/mL  Patient tolerance: Patient tolerated the procedure well with no immediate complications. Improvement noted after aspiration. Patient was wrapped following bandaging.    Ultrasound guidance was used for needle localization with SonTreater Edge 2, 15-6 MHz (L)probe(s). Images were saved and stored for documentation. Short and long axis images of the anterior knee were taken prior to injection confirming presence of joint effusion. The knee joint well visualized. Dynamic visualization of the needles was continuous throughout the procedure and maintained good position and correct needle placement.    Triamcinolone: x2  NDC: [address]-1  LOT: [id number]  EXP: 04/2025       ASSESSMENT:      ICD-10-CM ICD-9-CM   1. Primary osteoarthritis of both knees  M17.0 715.16   2. Chronic pain of right knee  M25.561 719.46    G89.29 338.29   3. Chronic pain of left knee  M25.562 719.46    G89.29 338.29         PLAN:  1-3.  Chronic bilateral knee pain/osteoarthritis - stable    - Nereida admits to chronic bilateral knee pain which has been present and slowly worsening over several years.    - Recall she had right knee ACP injection 11 weeks ago and feels as though her pain has improved but is not resolved. She states she has been experiencing severe left knee pain and is interested in discussing further injection options today.     - We reviewed the natural history of osteoarthritis and a multipronged treatment approach. We reviewed the importance of addressing three different aspects to best manage this condition. Controlling the intra-articular immune reaction through medications and/or injections, improving local  stability through bracing and/or injection, and improving functional stability through hip, core, and ankle strength, stability and mobility which may benefit from formal physical therapy.    - After discussing options, she elects to proceed with ultrasound guided bilateral knee CSI. See above for procedure detail.     - Continue with Mobic 15 mg daily as needed      Future planning includes - repeat PRP injection if good benefit is achieved from the injection on the right, repeat CSI in 3 months if helpful    All questions were answered to the best of my ability and all concerns were addressed at this time.    Follow up in 3 months for above, or sooner if needed.      Total time spent face-to face with patient counseling or coordinating care including prognosis, differential diagnosis, risks and benefits of treatment, instructions, compliance risk reductions as well as non-face-to-face time personally spent reviewing medial record, medical documentation, and coordination of care.     EST MINUTES X   93388 10-19    99459 20-29    79083 30-39 X   99215 40-54    NEW     47545 15-29    81528 30-44    14298 45-59    88021 60-74    PHONE      5-10    20392 11-20    84145 21-30

## 2023-09-01 ENCOUNTER — OFFICE VISIT (OUTPATIENT)
Dept: SPORTS MEDICINE | Facility: CLINIC | Age: 48
End: 2023-09-01
Payer: COMMERCIAL

## 2023-09-01 VITALS
BODY MASS INDEX: 46.58 KG/M2 | HEIGHT: 59 IN | HEART RATE: 89 BPM | DIASTOLIC BLOOD PRESSURE: 83 MMHG | SYSTOLIC BLOOD PRESSURE: 153 MMHG | WEIGHT: 231.06 LBS

## 2023-09-01 DIAGNOSIS — M25.561 CHRONIC PAIN OF RIGHT KNEE: ICD-10-CM

## 2023-09-01 DIAGNOSIS — G89.29 CHRONIC PAIN OF LEFT KNEE: ICD-10-CM

## 2023-09-01 DIAGNOSIS — G89.29 CHRONIC PAIN OF RIGHT KNEE: ICD-10-CM

## 2023-09-01 DIAGNOSIS — M25.562 CHRONIC PAIN OF LEFT KNEE: ICD-10-CM

## 2023-09-01 DIAGNOSIS — M17.0 PRIMARY OSTEOARTHRITIS OF BOTH KNEES: Primary | ICD-10-CM

## 2023-09-01 PROCEDURE — 99999 PR PBB SHADOW E&M-EST. PATIENT-LVL III: CPT | Mod: PBBFAC,,, | Performed by: ORTHOPAEDIC SURGERY

## 2023-09-01 PROCEDURE — 1160F RVW MEDS BY RX/DR IN RCRD: CPT | Mod: CPTII,S$GLB,, | Performed by: ORTHOPAEDIC SURGERY

## 2023-09-01 PROCEDURE — 20611 DRAIN/INJ JOINT/BURSA W/US: CPT | Mod: 50,S$GLB,, | Performed by: ORTHOPAEDIC SURGERY

## 2023-09-01 PROCEDURE — 3008F BODY MASS INDEX DOCD: CPT | Mod: CPTII,S$GLB,, | Performed by: ORTHOPAEDIC SURGERY

## 2023-09-01 PROCEDURE — 3077F PR MOST RECENT SYSTOLIC BLOOD PRESSURE >= 140 MM HG: ICD-10-PCS | Mod: CPTII,S$GLB,, | Performed by: ORTHOPAEDIC SURGERY

## 2023-09-01 PROCEDURE — 1160F PR REVIEW ALL MEDS BY PRESCRIBER/CLIN PHARMACIST DOCUMENTED: ICD-10-PCS | Mod: CPTII,S$GLB,, | Performed by: ORTHOPAEDIC SURGERY

## 2023-09-01 PROCEDURE — 1159F PR MEDICATION LIST DOCUMENTED IN MEDICAL RECORD: ICD-10-PCS | Mod: CPTII,S$GLB,, | Performed by: ORTHOPAEDIC SURGERY

## 2023-09-01 PROCEDURE — 3077F SYST BP >= 140 MM HG: CPT | Mod: CPTII,S$GLB,, | Performed by: ORTHOPAEDIC SURGERY

## 2023-09-01 PROCEDURE — 99214 OFFICE O/P EST MOD 30 MIN: CPT | Mod: 25,S$GLB,, | Performed by: ORTHOPAEDIC SURGERY

## 2023-09-01 PROCEDURE — 3079F PR MOST RECENT DIASTOLIC BLOOD PRESSURE 80-89 MM HG: ICD-10-PCS | Mod: CPTII,S$GLB,, | Performed by: ORTHOPAEDIC SURGERY

## 2023-09-01 PROCEDURE — 20611 PR DRAIN/ASP/INJECT MAJOR JOINT/BURSA W/US GUIDANCE: ICD-10-PCS | Mod: 50,S$GLB,, | Performed by: ORTHOPAEDIC SURGERY

## 2023-09-01 PROCEDURE — 1159F MED LIST DOCD IN RCRD: CPT | Mod: CPTII,S$GLB,, | Performed by: ORTHOPAEDIC SURGERY

## 2023-09-01 PROCEDURE — 99214 PR OFFICE/OUTPT VISIT, EST, LEVL IV, 30-39 MIN: ICD-10-PCS | Mod: 25,S$GLB,, | Performed by: ORTHOPAEDIC SURGERY

## 2023-09-01 PROCEDURE — 99999 PR PBB SHADOW E&M-EST. PATIENT-LVL III: ICD-10-PCS | Mod: PBBFAC,,, | Performed by: ORTHOPAEDIC SURGERY

## 2023-09-01 PROCEDURE — 3079F DIAST BP 80-89 MM HG: CPT | Mod: CPTII,S$GLB,, | Performed by: ORTHOPAEDIC SURGERY

## 2023-09-01 PROCEDURE — 3008F PR BODY MASS INDEX (BMI) DOCUMENTED: ICD-10-PCS | Mod: CPTII,S$GLB,, | Performed by: ORTHOPAEDIC SURGERY

## 2023-09-01 RX ORDER — TRIAMCINOLONE ACETONIDE 40 MG/ML
40 INJECTION, SUSPENSION INTRA-ARTICULAR; INTRAMUSCULAR
Status: COMPLETED | OUTPATIENT
Start: 2023-09-01 | End: 2023-09-01

## 2023-09-01 RX ADMIN — TRIAMCINOLONE ACETONIDE 40 MG: 40 INJECTION, SUSPENSION INTRA-ARTICULAR; INTRAMUSCULAR at 04:09

## 2023-09-28 ENCOUNTER — PATIENT MESSAGE (OUTPATIENT)
Dept: FAMILY MEDICINE | Facility: CLINIC | Age: 48
End: 2023-09-28
Payer: COMMERCIAL

## 2023-10-02 ENCOUNTER — PATIENT MESSAGE (OUTPATIENT)
Dept: SPORTS MEDICINE | Facility: CLINIC | Age: 48
End: 2023-10-02
Payer: COMMERCIAL

## 2023-10-02 DIAGNOSIS — M17.0 PRIMARY OSTEOARTHRITIS OF BOTH KNEES: Primary | ICD-10-CM

## 2023-10-02 DIAGNOSIS — M25.561 CHRONIC PAIN OF RIGHT KNEE: ICD-10-CM

## 2023-10-02 DIAGNOSIS — G89.29 CHRONIC PAIN OF RIGHT KNEE: ICD-10-CM

## 2023-10-03 RX ORDER — MELOXICAM 15 MG/1
15 TABLET ORAL DAILY
Qty: 30 TABLET | Refills: 1 | Status: SHIPPED | OUTPATIENT
Start: 2023-10-03 | End: 2023-12-16

## 2023-10-04 RX ORDER — MELOXICAM 15 MG/1
15 TABLET ORAL DAILY
Qty: 30 TABLET | Refills: 5 | Status: SHIPPED | OUTPATIENT
Start: 2023-10-04 | End: 2023-12-16

## 2023-10-09 DIAGNOSIS — R60.0 BILATERAL LOWER EXTREMITY EDEMA: ICD-10-CM

## 2023-10-09 RX ORDER — FUROSEMIDE 40 MG/1
40 TABLET ORAL DAILY
Qty: 30 TABLET | Refills: 3 | Status: SHIPPED | OUTPATIENT
Start: 2023-10-09 | End: 2024-10-08

## 2023-10-13 ENCOUNTER — OFFICE VISIT (OUTPATIENT)
Dept: RHEUMATOLOGY | Facility: CLINIC | Age: 48
End: 2023-10-13
Payer: COMMERCIAL

## 2023-10-13 VITALS
DIASTOLIC BLOOD PRESSURE: 79 MMHG | BODY MASS INDEX: 46.29 KG/M2 | WEIGHT: 229.63 LBS | SYSTOLIC BLOOD PRESSURE: 137 MMHG | HEIGHT: 59 IN | HEART RATE: 79 BPM

## 2023-10-13 DIAGNOSIS — M17.0 OSTEOARTHRITIS OF BOTH KNEES, UNSPECIFIED OSTEOARTHRITIS TYPE: Primary | ICD-10-CM

## 2023-10-13 PROCEDURE — 99204 PR OFFICE/OUTPT VISIT, NEW, LEVL IV, 45-59 MIN: ICD-10-PCS | Mod: S$GLB,,, | Performed by: STUDENT IN AN ORGANIZED HEALTH CARE EDUCATION/TRAINING PROGRAM

## 2023-10-13 PROCEDURE — 99204 OFFICE O/P NEW MOD 45 MIN: CPT | Mod: S$GLB,,, | Performed by: STUDENT IN AN ORGANIZED HEALTH CARE EDUCATION/TRAINING PROGRAM

## 2023-10-13 PROCEDURE — 3075F SYST BP GE 130 - 139MM HG: CPT | Mod: CPTII,S$GLB,, | Performed by: STUDENT IN AN ORGANIZED HEALTH CARE EDUCATION/TRAINING PROGRAM

## 2023-10-13 PROCEDURE — 1159F PR MEDICATION LIST DOCUMENTED IN MEDICAL RECORD: ICD-10-PCS | Mod: CPTII,S$GLB,, | Performed by: STUDENT IN AN ORGANIZED HEALTH CARE EDUCATION/TRAINING PROGRAM

## 2023-10-13 PROCEDURE — 1159F MED LIST DOCD IN RCRD: CPT | Mod: CPTII,S$GLB,, | Performed by: STUDENT IN AN ORGANIZED HEALTH CARE EDUCATION/TRAINING PROGRAM

## 2023-10-13 PROCEDURE — 3075F PR MOST RECENT SYSTOLIC BLOOD PRESS GE 130-139MM HG: ICD-10-PCS | Mod: CPTII,S$GLB,, | Performed by: STUDENT IN AN ORGANIZED HEALTH CARE EDUCATION/TRAINING PROGRAM

## 2023-10-13 PROCEDURE — 3078F PR MOST RECENT DIASTOLIC BLOOD PRESSURE < 80 MM HG: ICD-10-PCS | Mod: CPTII,S$GLB,, | Performed by: STUDENT IN AN ORGANIZED HEALTH CARE EDUCATION/TRAINING PROGRAM

## 2023-10-13 PROCEDURE — 3078F DIAST BP <80 MM HG: CPT | Mod: CPTII,S$GLB,, | Performed by: STUDENT IN AN ORGANIZED HEALTH CARE EDUCATION/TRAINING PROGRAM

## 2023-10-13 PROCEDURE — 3008F PR BODY MASS INDEX (BMI) DOCUMENTED: ICD-10-PCS | Mod: CPTII,S$GLB,, | Performed by: STUDENT IN AN ORGANIZED HEALTH CARE EDUCATION/TRAINING PROGRAM

## 2023-10-13 PROCEDURE — 99999 PR PBB SHADOW E&M-EST. PATIENT-LVL III: CPT | Mod: PBBFAC,,, | Performed by: STUDENT IN AN ORGANIZED HEALTH CARE EDUCATION/TRAINING PROGRAM

## 2023-10-13 PROCEDURE — 3008F BODY MASS INDEX DOCD: CPT | Mod: CPTII,S$GLB,, | Performed by: STUDENT IN AN ORGANIZED HEALTH CARE EDUCATION/TRAINING PROGRAM

## 2023-10-13 PROCEDURE — 99999 PR PBB SHADOW E&M-EST. PATIENT-LVL III: ICD-10-PCS | Mod: PBBFAC,,, | Performed by: STUDENT IN AN ORGANIZED HEALTH CARE EDUCATION/TRAINING PROGRAM

## 2023-10-13 RX ORDER — GABAPENTIN 100 MG/1
100 CAPSULE ORAL 3 TIMES DAILY
Qty: 90 CAPSULE | Refills: 6 | Status: SHIPPED | OUTPATIENT
Start: 2023-10-13 | End: 2024-05-10

## 2023-10-13 NOTE — PROGRESS NOTES
RHEUMATOLOGY OUTPATIENT CLINIC NOTE    10/13/2023    Attending Rheumatologist: Chanel Adams  Primary Care Provider: Jitendra Valadez III, MD   Physician Requesting Consultation: Self, Aaareferral  No address on file  Chief Complaint/Reason For Consultation:  Rheumatoid Arthritis (Joint pain/)      Subjective:       HPI  Nereida Narayanan is a 48 y.o. White female who comes for evaluation of osteoarthritis.     She reports pain and swelling in bilateral knees for the past 5 years. No prior injuries. Her pain is about 8/10 baseline, worse when walking. She has noted swelling in both knees too. She works at [employer] and has to be standing the whole day on 12 hour shifts. She was evaluated by sports Medicine recently, had bilateral arthocentesis on 9/1.   Right knee arthrocentesis yield 12 cc of clear synovial fluid.  Left knee arthrocentesis year 5 cc of clear synovial fluid. Both were injected with steroids.  She was told she is too young for knee replacement    She takes Aleve OTC which helps a little bit.  Ibuprofen and tramadol do not help.  Sports medicine prescribed Mobic which she has not picked up yet.  She has tried Aspercreme    She also reports pain in bilateral ankles with swelling. They are not too painful compared to knees.     Also reports bilateral swelling in the legs that is chronic Has had arterial and venous Doppler which have been negative.  She currently takes Lasix 40 mg daily.    She denies any other persistent joint pain or swelling, skin rashes, sicca symptoms, swollen glands.    Morning stiffness 20 minutes    Answers submitted by the patient for this visit:  Rheumatology Questionnaire (Submitted on 10/13/2023)  fever: No  eye redness: No  mouth sores: No  headaches: No  shortness of breath: No  chest pain: No  trouble swallowing: No  diarrhea: No  constipation: No  unexpected weight change: No  genital sore: No  dysuria: No  During the last 3 days, have you had a skin rash?:  No  Bruises or bleeds easily: No  cough: No         Chronic comorbid conditions affecting medical decision making today:  Past Medical History:   Diagnosis Date    Allergy     Morbid obesity with BMI of 45.0-49.9, adult      Past Surgical History:   Procedure Laterality Date    ARTHROSCOPIC CHONDROPLASTY OF KNEE JOINT Right 2020    Procedure: ARTHROSCOPY, KNEE, WITH CHONDROPLASTY;  Surgeon: Aiden Clarke DO;  Location: [facility] OR;  Service: Orthopedics;  Laterality: Right;     SECTION      KNEE ARTHROSCOPY W/ MENISCECTOMY Right 2020    Procedure: ARTHROSCOPY, KNEE, WITH MENISCECTOMY;  Surgeon: Aiden Clarke DO;  Location: [facility] OR;  Service: Orthopedics;  Laterality: Right;  Equipment: Scope; [employer] Truspar Meniscus Repair System; [employer] chondral drill set  Vendor: [employer]; [employer]  Table: Supine    KNEE ARTHROSCOPY W/ PLICA EXCISION Right 2020    Procedure: EXCISION, PLICA, KNEE, ARTHROSCOPIC;  Surgeon: Aiden Clarke DO;  Location: [facility] OR;  Service: Orthopedics;  Laterality: Right;     Family History   Problem Relation Age of Onset    Hypertension Mother     Alcohol abuse Father         none    Breast cancer Neg Hx     Ovarian cancer Neg Hx      Social History     Substance and Sexual Activity   Alcohol Use No     Social History     Tobacco Use   Smoking Status Never   Smokeless Tobacco Never     Social History     Substance and Sexual Activity   Drug Use No       Current Outpatient Medications:     furosemide (LASIX) 40 MG tablet, Take 1 tablet (40 mg total) by mouth once daily., Disp: 30 tablet, Rfl: 3    meloxicam (MOBIC) 15 MG tablet, Take 1 tablet (15 mg total) by mouth once daily., Disp: 30 tablet, Rfl: 1    meloxicam (MOBIC) 15 MG tablet, Take 1 tablet (15 mg total) by mouth once daily., Disp: 30 tablet, Rfl: 5    gabapentin (NEURONTIN) 100 MG capsule, Take 1 capsule (100 mg total) by mouth 3 (three) times daily., Disp: 90 capsule, Rfl: 6     Objective:         Vitals:    10/13/23  1602   BP: 137/79   Pulse: 79     Physical Exam   Constitutional: She is oriented to person, place, and time. She appears well-developed.   HENT:   Head: Normocephalic.   Mouth/Throat: Mucous membranes are moist.   Salivary pool adequate  Oral aperture is normal   Mouth/Throat: No oral ulcers  Pulmonary/Chest: Effort normal and breath sounds normal.   Abdominal: Soft.   Musculoskeletal:      Cervical back: Neck supple.      Comments: Bilateral knees with crepitus, no tender in the joint line, no overt swelling.   No pain or swelling in ankles    Lymphadenopathy:     She has no cervical adenopathy.   Neurological: She is alert and oriented to person, place, and time.   Skin: No rash noted.   No skin rashes noted   Vitals reviewed.    Reviewed old and all outside pertinent medical records available.    All lab results personally reviewed and interpreted by me.  Lab Results   Component Value Date    WBC 7.79 08/24/2023    HGB 11.5 (L) 08/24/2023    HCT 36.2 (L) 08/24/2023    MCV 86 08/24/2023    MCH 27.2 08/24/2023    MCHC 31.8 (L) 08/24/2023    RDW 14.6 (H) 08/24/2023     08/24/2023    MPV 10.3 08/24/2023       Lab Results   Component Value Date     (L) 08/24/2023    K 3.8 08/24/2023     08/24/2023    CO2 24 08/24/2023    GLU 87 08/24/2023    BUN 15 08/24/2023    CALCIUM 9.2 08/24/2023    PROT 7.4 08/24/2023    ALBUMIN 3.7 08/24/2023    BILITOT 0.5 08/24/2023    AST 25 08/24/2023    ALKPHOS 54 (L) 08/24/2023    ALT 32 08/24/2023       Lab Results   Component Value Date    COLORU Yellow 01/05/2023    APPEARANCEUA Cloudy (A) 05/17/2022    SPECGRAV >=1.030 01/05/2023    PHUR 5.5 01/05/2023    PROTEINUA Negative 05/17/2022    KETONESU Negative 01/05/2023    LEUKOCYTESUR Negative 05/17/2022    NITRITE Negative 01/05/2023    UROBILINOGEN 0.2 01/05/2023       Lab Results   Component Value Date    CRP 1.29 (H) 08/24/2023       Lab Results   Component Value Date    YOAN Negative 08/24/2023    RF <10.0  "08/24/2023    SEDRATE 32 (H) 08/24/2023       No components found for: "25OHVITDTOT", "22NXQGDL6", "42VCYLEQ4", "METHODNOTE"    No results found for: "URICACID"    Lab Results   Component Value Date    HEPCAB Negative 05/13/2022    HEPCAB Negative 11/10/2021       Imaging:  All imaging reviewed and independently interpreted by me.  XR bilateral knees: severe medial compartment narrowing.       ASSESSMENT / PLAN:     Nereida Narayanan is a 48 y.o. White female with:    1. Osteoarthritis of both knees, unspecified osteoarthritis type  -discussed with patient that her pain is related to osteoarthritis.  No signs of inflammatory arthritis. We discussed the XR results on detail and the nature of the disease  -She we will start meloxicam per sports Medicine recommendation.  -advised patient to start using diclofenac gel that she can get over-the-counter.  -discussed with patient about starting gabapentin 100 mg at night to see if it helps with pain.  She can increase the dose to up to 3 times a day.  Side effects discussed.  Patient agrees with trial.  -discussed with patient that weight loss will help her pain.  - gabapentin (NEURONTIN) 100 MG capsule; Take 1 capsule (100 mg total) by mouth 3 (three) times daily.  Dispense: 90 capsule; Refill: 6    2. Morbid Obesity  - would benefit from decreasing at least 10% of body weight.  - recommended goal of losing 1 lb per week.  - consider nutritionist evaluation.  - would consider screening for LISSETTE per PMD.    Follow up in about 4 months (around 2/13/2024).    Method of contact with patient concerns: [facility]    Disclaimer:  This note is prepared using voice recognition software and as such is likely to have errors and has not been proof read. Please contact me for questions.     Time spent: 45 minutes in face to face discussion concerning diagnosis, prognosis, review of lab and test results, benefits of treatment as well as management of disease, counseling of " patient and coordination of care between various health care providers.  Greater than half the time spent was used for coordination of care and counseling of patient.    Chanel Adams M.D.  Rheumatology  [facility]

## 2023-11-08 ENCOUNTER — TELEPHONE (OUTPATIENT)
Dept: SURGERY | Facility: CLINIC | Age: 48
End: 2023-11-08
Payer: COMMERCIAL

## 2023-11-09 ENCOUNTER — TELEPHONE (OUTPATIENT)
Dept: BARIATRICS | Facility: CLINIC | Age: 48
End: 2023-11-09
Payer: COMMERCIAL

## 2023-12-07 DIAGNOSIS — Z12.31 ENCOUNTER FOR SCREENING MAMMOGRAM FOR MALIGNANT NEOPLASM OF BREAST: Primary | ICD-10-CM

## 2023-12-11 ENCOUNTER — OFFICE VISIT (OUTPATIENT)
Dept: SPORTS MEDICINE | Facility: CLINIC | Age: 48
End: 2023-12-11
Payer: COMMERCIAL

## 2023-12-11 ENCOUNTER — PATIENT MESSAGE (OUTPATIENT)
Dept: SPORTS MEDICINE | Facility: CLINIC | Age: 48
End: 2023-12-11

## 2023-12-11 VITALS
BODY MASS INDEX: 46.22 KG/M2 | HEIGHT: 59 IN | WEIGHT: 229.25 LBS | HEART RATE: 80 BPM | DIASTOLIC BLOOD PRESSURE: 78 MMHG | SYSTOLIC BLOOD PRESSURE: 130 MMHG

## 2023-12-11 DIAGNOSIS — M25.561 CHRONIC PAIN OF RIGHT KNEE: ICD-10-CM

## 2023-12-11 DIAGNOSIS — G89.29 CHRONIC PAIN OF LEFT KNEE: ICD-10-CM

## 2023-12-11 DIAGNOSIS — G89.29 CHRONIC PAIN OF RIGHT KNEE: ICD-10-CM

## 2023-12-11 DIAGNOSIS — M25.562 CHRONIC PAIN OF LEFT KNEE: ICD-10-CM

## 2023-12-11 DIAGNOSIS — M17.0 PRIMARY OSTEOARTHRITIS OF BOTH KNEES: Primary | ICD-10-CM

## 2023-12-11 PROCEDURE — 3075F PR MOST RECENT SYSTOLIC BLOOD PRESS GE 130-139MM HG: ICD-10-PCS | Mod: CPTII,S$GLB,, | Performed by: ORTHOPAEDIC SURGERY

## 2023-12-11 PROCEDURE — 20611 PR DRAIN/ASP/INJECT MAJOR JOINT/BURSA W/US GUIDANCE: ICD-10-PCS | Mod: 50,S$GLB,, | Performed by: ORTHOPAEDIC SURGERY

## 2023-12-11 PROCEDURE — 3075F SYST BP GE 130 - 139MM HG: CPT | Mod: CPTII,S$GLB,, | Performed by: ORTHOPAEDIC SURGERY

## 2023-12-11 PROCEDURE — 1160F RVW MEDS BY RX/DR IN RCRD: CPT | Mod: CPTII,S$GLB,, | Performed by: ORTHOPAEDIC SURGERY

## 2023-12-11 PROCEDURE — 99999 PR PBB SHADOW E&M-EST. PATIENT-LVL III: ICD-10-PCS | Mod: PBBFAC,,, | Performed by: ORTHOPAEDIC SURGERY

## 2023-12-11 PROCEDURE — 1159F MED LIST DOCD IN RCRD: CPT | Mod: CPTII,S$GLB,, | Performed by: ORTHOPAEDIC SURGERY

## 2023-12-11 PROCEDURE — 1160F PR REVIEW ALL MEDS BY PRESCRIBER/CLIN PHARMACIST DOCUMENTED: ICD-10-PCS | Mod: CPTII,S$GLB,, | Performed by: ORTHOPAEDIC SURGERY

## 2023-12-11 PROCEDURE — 99999 PR PBB SHADOW E&M-EST. PATIENT-LVL III: CPT | Mod: PBBFAC,,, | Performed by: ORTHOPAEDIC SURGERY

## 2023-12-11 PROCEDURE — 3078F DIAST BP <80 MM HG: CPT | Mod: CPTII,S$GLB,, | Performed by: ORTHOPAEDIC SURGERY

## 2023-12-11 PROCEDURE — 99214 PR OFFICE/OUTPT VISIT, EST, LEVL IV, 30-39 MIN: ICD-10-PCS | Mod: 25,S$GLB,, | Performed by: ORTHOPAEDIC SURGERY

## 2023-12-11 PROCEDURE — 3008F PR BODY MASS INDEX (BMI) DOCUMENTED: ICD-10-PCS | Mod: CPTII,S$GLB,, | Performed by: ORTHOPAEDIC SURGERY

## 2023-12-11 PROCEDURE — 3078F PR MOST RECENT DIASTOLIC BLOOD PRESSURE < 80 MM HG: ICD-10-PCS | Mod: CPTII,S$GLB,, | Performed by: ORTHOPAEDIC SURGERY

## 2023-12-11 PROCEDURE — 1159F PR MEDICATION LIST DOCUMENTED IN MEDICAL RECORD: ICD-10-PCS | Mod: CPTII,S$GLB,, | Performed by: ORTHOPAEDIC SURGERY

## 2023-12-11 PROCEDURE — 3008F BODY MASS INDEX DOCD: CPT | Mod: CPTII,S$GLB,, | Performed by: ORTHOPAEDIC SURGERY

## 2023-12-11 PROCEDURE — 20611 DRAIN/INJ JOINT/BURSA W/US: CPT | Mod: 50,S$GLB,, | Performed by: ORTHOPAEDIC SURGERY

## 2023-12-11 PROCEDURE — 99214 OFFICE O/P EST MOD 30 MIN: CPT | Mod: 25,S$GLB,, | Performed by: ORTHOPAEDIC SURGERY

## 2023-12-11 RX ORDER — TRIAMCINOLONE ACETONIDE 40 MG/ML
40 INJECTION, SUSPENSION INTRA-ARTICULAR; INTRAMUSCULAR
Status: COMPLETED | OUTPATIENT
Start: 2023-12-11 | End: 2023-12-11

## 2023-12-11 RX ORDER — TRIAMCINOLONE ACETONIDE 40 MG/ML
40 INJECTION, SUSPENSION INTRA-ARTICULAR; INTRAMUSCULAR
Status: CANCELLED | OUTPATIENT
Start: 2023-12-11 | End: 2023-12-11

## 2023-12-11 RX ADMIN — TRIAMCINOLONE ACETONIDE 40 MG: 40 INJECTION, SUSPENSION INTRA-ARTICULAR; INTRAMUSCULAR at 03:12

## 2023-12-11 NOTE — LETTER
Patient: Nereida Narayanan   YOB: 1975   Clinic Number: [id number]   Today's Date: December 11, 2023        Certificate to Return to Work     Nereida Hathaway was seen by Tang Allison DO on 12/11/2023.      Nereida Hathaway can return to work on 12/11/23.      If you have any questions or concerns, please feel free to contact the office at [phone].    Thank you.    Tang Allison DO          Signature: __________________________________________________

## 2023-12-11 NOTE — PROGRESS NOTES
CC: bilateral knee pain    Nereida is here today for follow up evaluation of her bilateral knee pain. Patient reports her pain is 10/10 today. Her most recent bilateral knee CSI was 09/01/2023 and she admits to appreciating approximately 2 weeks of improvement in her pain following. She endorses increased pain beginning 12/06/2023 without new mechanism of injury. The increased right knee pain occurred after her typical work shift, where she noticed increase pain and swelling, making it hard for her to ambulate. Patient treated pain with rest, elevation, and Epson salt. Was able to ambulate better on her way to her appointment today than in recent days. Denies recent trauma, fevers, chills. Endorsed some radiation down her right leg, which soon self resolved. Has been taking Aleve with no relief of pain.     Recall from visit on 09/01/2023  Nereida is here today for follow up evaluation of her bilateral knee pain. Patient reports her pain is 8/10 today. Recall she had right knee intra-articular ACP injection 11 weeks ago and states her pain is improved but not resolved. She states her left knee pain has been severe and admits to feeling swollen bilaterally today. She denies new falls or trauma since her last visit. She would like to discuss possible injection options today.     Recall from visit on 05/02/2023  48 y.o. Female presents today for evaluation of her bilateral R>L knee pain. The pain is located mostly medial of bilateral knees. Patient does have plans to be seen in bariatric clinic in June for evaluation of potential surgery. She is interested in any treatment options that could help the pain.  How long: Multiple year history  What makes it better: advil, aleve, heat  What makes it worse: exertion and weightbearing  Does it radiate: No  Attempted treatments: Recently received bilateral triamcinolone intra-articular steroid injections to knees in February 2022 with 2 weeks of partial relief. Also tried PT  in  with limited relief.  Pain score: Currently 5/10 which for her is low. Can be 8/10 at times  Any mechanical symptoms: Denies  Feelings of instability: Denies  Affect on ADLs: She is not as active as she wishes due to knee pain.    Occupation: Works at airport    PAST MEDICAL HISTORY:   Past Medical History:   Diagnosis Date    Allergy     Morbid obesity with BMI of 45.0-49.9, adult        PAST SURGICAL HISTORY:   Past Surgical History:   Procedure Laterality Date    ARTHROSCOPIC CHONDROPLASTY OF KNEE JOINT Right 2020    Procedure: ARTHROSCOPY, KNEE, WITH CHONDROPLASTY;  Surgeon: Aiden Clarke DO;  Location: [facility] OR;  Service: Orthopedics;  Laterality: Right;     SECTION      KNEE ARTHROSCOPY W/ MENISCECTOMY Right 2020    Procedure: ARTHROSCOPY, KNEE, WITH MENISCECTOMY;  Surgeon: Aiden Clarke DO;  Location: [facility] OR;  Service: Orthopedics;  Laterality: Right;  Equipment: Scope; Mirimus Truspar Meniscus Repair System; Logan chondral drill set  Vendor: [employer]; Maria Eugenia  Table: Supine    KNEE ARTHROSCOPY W/ PLICA EXCISION Right 2020    Procedure: EXCISION, PLICA, KNEE, ARTHROSCOPIC;  Surgeon: Aiden Clarke DO;  Location: [facility] OR;  Service: Orthopedics;  Laterality: Right;       FAMILY HISTORY:   Family History   Problem Relation Age of Onset    Hypertension Mother     Alcohol abuse Father         none    Breast cancer Neg Hx     Ovarian cancer Neg Hx        SOCIAL HISTORY:   Social History     Socioeconomic History    Marital status: Single   Tobacco Use    Smoking status: Never    Smokeless tobacco: Never   Substance and Sexual Activity    Alcohol use: No    Drug use: No    Sexual activity: Yes     Partners: Male     Birth control/protection: None     Social Determinants of Health     Financial Resource Strain: Low Risk  (2023)    Overall Financial Resource Strain (CARDIA)     Difficulty of Paying Living Expenses: Not hard at all   Food Insecurity: No Food Insecurity  (12/11/2023)    Hunger Vital Sign     Worried About Running Out of Food in the Last Year: Never true     Ran Out of Food in the Last Year: Never true   Transportation Needs: No Transportation Needs (12/11/2023)    PRAPARE - Transportation     Lack of Transportation (Medical): No     Lack of Transportation (Non-Medical): No   Physical Activity: Sufficiently Active (12/11/2023)    Exercise Vital Sign     Days of Exercise per Week: 5 days     Minutes of Exercise per Session: 30 min   Stress: No Stress Concern Present (12/11/2023)    [employer] - Occupational Stress Questionnaire     Feeling of Stress : Not at all   Social Connections: Unknown (12/11/2023)    Social Connection and Isolation Panel [NHANES]     Frequency of Communication with Friends and Family: Twice a week     Frequency of Social Gatherings with Friends and Family: Twice a week     Active Member of Clubs or Organizations: No     Attends Club or Organization Meetings: 1 to 4 times per year     Marital Status: Never    Housing Stability: Low Risk  (12/11/2023)    Housing Stability Vital Sign     Unable to Pay for Housing in the Last Year: No     Number of Places Lived in the Last Year: 1     Unstable Housing in the Last Year: No       MEDICATIONS:     Current Outpatient Medications:     furosemide (LASIX) 40 MG tablet, Take 1 tablet (40 mg total) by mouth once daily., Disp: 30 tablet, Rfl: 3    gabapentin (NEURONTIN) 100 MG capsule, Take 1 capsule (100 mg total) by mouth 3 (three) times daily., Disp: 90 capsule, Rfl: 6    meloxicam (MOBIC) 15 MG tablet, Take 1 tablet (15 mg total) by mouth once daily., Disp: 30 tablet, Rfl: 1    meloxicam (MOBIC) 15 MG tablet, Take 1 tablet (15 mg total) by mouth once daily., Disp: 30 tablet, Rfl: 5  No current facility-administered medications for this visit.    ALLERGIES:   Review of patient's allergies indicates:   Allergen Reactions    Adhesive      Causes skin tearing and redness   "       PHYSICAL EXAMINATION:  /78   Pulse 80   Ht 4' 11" (1.499 m)   Wt 104 kg (229 lb 4.5 oz)   BMI 46.31 kg/m²   Vitals signs and nursing note have been reviewed.  General: In no acute distress, well developed, well nourished, no diaphoresis  Eyes: EOM full and smooth, no eye redness or discharge  HENT: normocephalic and atraumatic, neck supple, trachea midline, no nasal discharge, no external ear redness or discharge  Cardiovascular: 2+ and symmetric DP pulses bilaterally, no LE edema  Lungs: respirations non-labored, no conversational dyspnea   Abd: non-distended, no rigidity  MSK: no amputation or deformity, no swelling of extremities  Neuro: AAOx3, CN2-12 grossly intact  Skin: No rashes, warm and dry  Psychiatric: cooperative, pleasant, mood and affect appropriate for age    MUSCULOSKELETAL EXAM:    BILATERAL KNEE EXAMINATION   Affected side is compared to contralateral knee     Observation:  No edema, erythema, ecchymosis noted.  Suprapatellar effusion bilaterally but more pronounced on right  No muscle atrophy of the thighs and calves noted.  No obvious bony deformities noted.   No genu valgus/varum noted. No recurvatum noted.      Tenderness:  Patella - none    Lateral joint line - none  Quad tendon - none   Medial joint line - + bilaterally  Patellar tendon - none   Medial plica - none  Tibial tubercle - none   Lateral plica - none  Pes anserine - + b/l   MCL prox - none  Distal ITB - none   MCL distal - none  MFC - none    LCL prox - none  LFC - none    LCL distal - none  Tibia - none    Fibula - none    No obvious bursae, plicae, popliteal cysts, or tendon derangement palpated.          ROM:   Active extension to 0° on left without hyperextension, lag, or patellar J sign.   Active extension to 0° on right without hyperextension, lag, or patellar J sign.   Slight crepitus with extension bilaterally  Active flexion to 120° on left and 120° on right limited by soft tissue.    Strength: " (bilaterally)  Knee Flexion - 5/5 on left and 5/5 on right  Knee Extension - 5/5 on left and 5/5 on right  Hip Flexion - 5/5 on left and 5/5 on right  Hip Extension - 5/5 on left and 5/5 on right  Ankle dorsiflexion - 5/5 on left and 5/5 on right  Ankle Plantarflexion - 5/5 on left and 5/5 on right    Patellofemoral Exam:  Patellar ballottement - negative  Bulge sign - negative  Patellar grind - negative on left, positive on right  No patellar laxity with medial and lateral translation   No apprehension with medial and lateral patellar translation.     Meniscus Testing:     No pain with terminal extension and flexion.  Shashas test - negative   Bounce home test - negative    Ligament Testing:  Lachman's test - negative  No laxity with varus testing at 0 and 30 degrees.  No laxity with valgus testing at 0 and 30 degrees.    Neurovascular Examination:   Antalgic gait  Sensation intact to light touch in the obturator, lateral/intermediate/medial/posterior femoral cutaneous, saphenous, and common peroneal nerves bilaterally.  Pulses intact at the DP and PT arteries bilaterally.    Capillary refill intact <2 seconds in all toes bilaterally.      IMAGIN. X-ray obtained on 2022 due to bilateral knee pain  2. X-ray images were reviewed personally by me and then directly with patient.  3. FINDINGS: X-ray images obtained demonstrate tricompartmental degenerative changes, most pronounced in the medial compartment bilaterally.  Kellgren and Clay grade 3  4. IMPRESSION: As above.       PROCEDURE:  Large Joint Aspiration/Injection  Knee joint, bilateral  Performed by: PEYMAN MCGUIRE  Authorized by: PEYMAN MCGUIRE  Consent Done?: Yes (Verbal and written)  Indications: Pain and joint effusion  Site marked: The procedure site was marked   Timeout: Prior to procedure the correct patient, procedure, and site was verified   Location: Knee joint, bilateral  Prep: Patient was prepped with Chlorhexidine and  "alcohol.  Ultrasound Guidance for needle placement: yes    Procedure RIGHT: Ethyl Chloride spray was used prior to skin puncture. After cold spray was applied, 2-4 cc's of 0.2% ropivacaine was injected into the skin and superficial tissue at the injection site using a 25G, 1.5" needle to form an anesthetic tunnel and ensure proper placement of the needle into the joint space. After local anesthetic was applied an 18G, 1.5 needle was used to enter the knee joint capsule under US guidance. 31 cc of clear synovial fluid was aspirated. Following aspiration, a 3 cc mixture of 1 cc of 40 mg/ml triamcinolone acetonide and 2 cc of 0.2% ropivacaine were injected into the knee joint.     Procedure LEFT: Ethyl Chloride spray was used prior to skin puncture. After cold spray was applied, 2-4 cc's of 0.2% ropivacaine was injected into the skin and superficial tissue at the injection site using a 25G, 1.5" needle to form an anesthetic tunnel and ensure proper placement of the needle into the joint space. After local anesthetic was applied an 18G, 1.5 needle was used to enter the knee joint capsule under US guidance. 8 cc of red tinged synovial fluid was aspirated. Following aspiration, a 3 cc mixture of 1 cc of 40 mg/ml triamcinolone acetonide and 2 cc of 0.2% ropivacaine were injected into the knee joint.     Approach: superiorlateral  Medications: 40 mg triamcinolone acetonide 40 mg/mL  Patient tolerance: Patient tolerated the procedure well with no immediate complications. Improvement noted after aspiration. Patient was wrapped following bandaging.    Ultrasound guidance was used for needle localization with [employer] 2, 15-6 MHz (L)probe(s). Images were saved and stored for documentation. Short and long axis images of the anterior knee were taken prior to injection confirming presence of joint effusion. The knee joint well visualized. Dynamic visualization of the needles was continuous throughout the procedure and " maintained good position and correct needle placement.    Triamcinolone: x2  NDC: [address]-7  LOT: [id number]  EXP: 2025-07      ASSESSMENT:      ICD-10-CM ICD-9-CM   1. Primary osteoarthritis of both knees  M17.0 715.16   2. Chronic pain of right knee  M25.561 719.46    G89.29 338.29   3. Chronic pain of left knee  M25.562 719.46    G89.29 338.29         PLAN:  1-3.  Chronic bilateral knee pain/osteoarthritis - recent exacerbation    - 48 year old female with PMH of osteoarthritis causing chronic bilateral knee pain, presented to clinic for increase right knee pain and swelling.     - Nereida admits to chronic bilateral knee pain which has been present and slowly worsening over several years.    - Her most recent bilateral knee CSI was 09/01/2023 and she admits to appreciating approximately 2 weeks of improvement in her pain following. She admits to increased pain beginning 12/06/2023 without new mechanism of injury.     - We reviewed the natural history of osteoarthritis and a multipronged treatment approach. We reviewed the importance of addressing three different aspects to best manage this condition. Controlling the intra-articular immune reaction through medications and/or injections, improving local stability through bracing and/or injection, and improving functional stability through hip, core, and ankle strength, stability and mobility which may benefit from formal physical therapy.    - After discussing options, she elects to proceed with ultrasound guided bilateral knee CSI. See above for procedure detail.     - Continue with over-the-counter Aleve for symptom relief as this appears to work the best for her.    - She states she is awaiting new insurance cards. Once she obtains and updates these in her chart, we will try to authorize bilateral knee viscosupplementation injections as she will likely benefit from this and as she has not found significant improvement with other conservative options.        Future planning includes - viscosupplementation injection series, repeat CSI in 3 months if helpful    All questions were answered to the best of my ability and all concerns were addressed at this time.    Follow up in 3 months for above, or sooner if needed.      Total time spent face-to face with patient counseling or coordinating care including prognosis, differential diagnosis, risks and benefits of treatment, instructions, compliance risk reductions as well as non-face-to-face time personally spent reviewing medial record, medical documentation, and coordination of care.     EST MINUTES X   32235 10-19    23652 20-29    07544 30-39 X   99215 40-54    NEW     08055 15-29    61479 30-44    21723 45-59    37643 60-74    PHONE      5-10    21780 11-20    09980 21-30

## 2023-12-11 NOTE — LETTER
Patient: Nereida Narayanan   YOB: 1975   Clinic Number: [id number]   Today's Date: December 11, 2023        Certificate to Return to Work     Nereida Hathaway was seen by Tang Allison DO on 12/11/2023.      Nereida Hathaway can return to work on 12/12/23.      If you have any questions or concerns, please feel free to contact the office at [phone].    Thank you.    Tagn Allison DO          Signature: __________________________________________________

## 2023-12-12 ENCOUNTER — PATIENT MESSAGE (OUTPATIENT)
Dept: SPORTS MEDICINE | Facility: CLINIC | Age: 48
End: 2023-12-12
Payer: COMMERCIAL

## 2023-12-12 DIAGNOSIS — G89.29 CHRONIC PAIN OF RIGHT KNEE: Primary | ICD-10-CM

## 2023-12-12 DIAGNOSIS — G89.29 CHRONIC PAIN OF LEFT KNEE: ICD-10-CM

## 2023-12-12 DIAGNOSIS — M25.562 CHRONIC PAIN OF LEFT KNEE: ICD-10-CM

## 2023-12-12 DIAGNOSIS — M25.561 CHRONIC PAIN OF RIGHT KNEE: Primary | ICD-10-CM

## 2023-12-12 RX ORDER — METHOCARBAMOL 500 MG/1
500 TABLET, FILM COATED ORAL 3 TIMES DAILY PRN
Qty: 30 TABLET | Refills: 0 | Status: SHIPPED | OUTPATIENT
Start: 2023-12-12 | End: 2024-01-23 | Stop reason: SDUPTHER

## 2023-12-12 NOTE — PROGRESS NOTES
Patient requesting to try a muscle relaxer for her persistent knee pain.  Short course of Robaxin has been prescribed to see if this helps.

## 2023-12-16 ENCOUNTER — OFFICE VISIT (OUTPATIENT)
Dept: URGENT CARE | Facility: CLINIC | Age: 48
End: 2023-12-16
Payer: COMMERCIAL

## 2023-12-16 VITALS
WEIGHT: 229 LBS | HEIGHT: 59 IN | RESPIRATION RATE: 17 BRPM | BODY MASS INDEX: 46.16 KG/M2 | DIASTOLIC BLOOD PRESSURE: 88 MMHG | TEMPERATURE: 98 F | OXYGEN SATURATION: 98 % | HEART RATE: 69 BPM | SYSTOLIC BLOOD PRESSURE: 142 MMHG

## 2023-12-16 DIAGNOSIS — M25.561 CHRONIC PAIN OF RIGHT KNEE: Primary | ICD-10-CM

## 2023-12-16 DIAGNOSIS — G89.29 CHRONIC PAIN OF RIGHT KNEE: Primary | ICD-10-CM

## 2023-12-16 PROCEDURE — 99214 OFFICE O/P EST MOD 30 MIN: CPT | Mod: S$GLB,,,

## 2023-12-16 PROCEDURE — 99214 PR OFFICE/OUTPT VISIT, EST, LEVL IV, 30-39 MIN: ICD-10-PCS | Mod: S$GLB,,,

## 2023-12-16 RX ORDER — DICLOFENAC SODIUM 10 MG/G
2 GEL TOPICAL 4 TIMES DAILY
Qty: 20 G | Refills: 2 | Status: SHIPPED | OUTPATIENT
Start: 2023-12-16

## 2023-12-16 RX ORDER — DICLOFENAC SODIUM 50 MG/1
50 TABLET, DELAYED RELEASE ORAL 2 TIMES DAILY
Qty: 28 TABLET | Refills: 0 | Status: SHIPPED | OUTPATIENT
Start: 2023-12-16 | End: 2023-12-30

## 2023-12-16 NOTE — PROGRESS NOTES
"Subjective:      Patient ID: Nereida Narayanan is a 48 y.o. female.    Vitals:  height is 4' 11.02" (1.499 m) and weight is 103.9 kg (229 lb). Her tympanic temperature is 98.4 °F (36.9 °C). Her blood pressure is 142/88 (abnormal) and her pulse is 69. Her respiration is 17 and oxygen saturation is 98%.     Chief Complaint: Other Misc (Pain in right leg - Entered by patient)    This is a 48 y.o female who presents today with chief complaint of Rt posterior knee pain x3 days.   Patient denies injures/hx of trauma to her Rt knee.   Patient has been taking Aleeve and muscle relaxer and reports no improvement.     Provider note:    47 yo F c/o chronic right knee pain. States she had an injection to the right knee this past Monday by ortho. She did not receive any relief. Pain is 8/10 and worse with walking. She was given aleve and muscle relaxer from ortho which she has been taking. [place] and is on her feet all day. She has not elevated and iced the knee. Denies trauma.     Knee Pain   The incident occurred 3 to 5 days ago. The injury mechanism is unknown. The pain is present in the right knee. The pain is at a severity of 8/10. The pain is severe. Pertinent negatives include no inability to bear weight, loss of motion, loss of sensation, muscle weakness, numbness or tingling. The symptoms are aggravated by movement.       Musculoskeletal:  Positive for pain and joint pain. Negative for trauma.   Neurological:  Negative for numbness and tingling.      Objective:     Physical Exam   Constitutional: She is oriented to person, place, and time. obesity  HENT:   Head: Normocephalic and atraumatic.   Eyes: Conjunctivae are normal. Pupils are equal, round, and reactive to light. Extraocular movement intact   Musculoskeletal: Normal range of motion.         General: Tenderness present. Normal range of motion.      Right knee: She exhibits normal range of motion, no effusion, no ecchymosis, no deformity, no " laceration, no erythema, normal alignment, no LCL laxity, normal patellar mobility, no bony tenderness, normal meniscus and no MCL laxity. Tenderness found. Lateral joint line tenderness noted. No medial joint line, no MCL, no LCL and no patellar tendon tenderness noted.      Left knee: Normal.      Comments: Tenderness to lateral aspect of right knee. Full ROM present.    Neurological: She is alert and oriented to person, place, and time.   Skin: Skin is warm and dry. not right knee      Assessment:     1. Chronic pain of right knee        Plan:   Follow up with ortho Monday. D/c aleve and may take diclofenac as needed.    Chronic pain of right knee    Other orders  -     diclofenac (VOLTAREN) 50 MG EC tablet; Take 1 tablet (50 mg total) by mouth 2 (two) times daily. for 14 days  Dispense: 28 tablet; Refill: 0  -     diclofenac sodium (VOLTAREN) 1 % Gel; Apply 2 g topically 4 (four) times daily.  Dispense: 20 g; Refill: 2      Please drink plenty of fluids.  Please get plenty of rest.  Please return here or go to the Emergency Department for any concerns or worsening of condition.  If you were prescribed a narcotic medication, do not drive or operate heavy equipment or machinery while taking these medications.  If you were not prescribed an anti-inflammatory medication, and if you do not have any history of stomach/intestinal ulcers, or kidney disease, or are not taking a blood thinner such as Coumadin, Plavix, Pradaxa, Eloquis, or Xaralta for example, it is OK to take over the counter Ibuprofen or Advil or Motrin or Aleve as directed.  Do not take these medications on an empty stomach.  Rest, ice, compression and elevation to the affected joint or limb as needed.  Please follow up with your primary care doctor or specialist as needed.    If you  smoke, please stop smoking.

## 2023-12-16 NOTE — LETTER
December 16, 2023      [facility]  [address]  [facility] [address]  Phone: [phone]  Fax: [phone]       Patient: Nereida Narayanan   YOB: 1975  Date of Visit: 12/16/2023    To Whom It May Concern:    Yasmine Narayanan  was at [facility] on 12/16/2023. The patient may return to work/school on 12/17/23 with no restrictions. If you have any questions or concerns, or if I can be of further assistance, please do not hesitate to contact me.    Sincerely,    Jennifer Brooks NP

## 2023-12-16 NOTE — PATIENT INSTRUCTIONS
Please drink plenty of fluids.  Please get plenty of rest.  Please return here or go to the Emergency Department for any concerns or worsening of condition.  If you were not prescribed an anti-inflammatory medication, and if you do not have any history of stomach/intestinal ulcers, or kidney disease, or are not taking a blood thinner such as Coumadin, Plavix, Pradaxa, Eloquis, or Xaralta for example, it is OK to take over the counter Ibuprofen or Advil or Motrin or Aleve as directed.  Do not take these medications on an empty stomach.  Rest, ice, compression and elevation to the affected joint or limb as needed.  Please follow up with your primary care doctor or specialist as needed.    If you  smoke, please stop smoking.

## 2023-12-31 ENCOUNTER — OFFICE VISIT (OUTPATIENT)
Dept: URGENT CARE | Facility: CLINIC | Age: 48
End: 2023-12-31
Payer: COMMERCIAL

## 2023-12-31 VITALS
OXYGEN SATURATION: 97 % | HEIGHT: 59 IN | SYSTOLIC BLOOD PRESSURE: 136 MMHG | WEIGHT: 229 LBS | HEART RATE: 79 BPM | DIASTOLIC BLOOD PRESSURE: 87 MMHG | TEMPERATURE: 98 F | BODY MASS INDEX: 46.16 KG/M2 | RESPIRATION RATE: 16 BRPM

## 2023-12-31 DIAGNOSIS — J10.1 INFLUENZA A: ICD-10-CM

## 2023-12-31 DIAGNOSIS — R05.9 COUGH, UNSPECIFIED TYPE: Primary | ICD-10-CM

## 2023-12-31 LAB
CTP QC/QA: YES
CTP QC/QA: YES
POC MOLECULAR INFLUENZA A AGN: POSITIVE
POC MOLECULAR INFLUENZA B AGN: NEGATIVE
SARS-COV-2 AG RESP QL IA.RAPID: NEGATIVE

## 2023-12-31 PROCEDURE — 87811 SARS CORONAVIRUS 2 ANTIGEN POCT, MANUAL READ: ICD-10-PCS | Mod: QW,S$GLB,, | Performed by: NURSE PRACTITIONER

## 2023-12-31 PROCEDURE — 99213 PR OFFICE/OUTPT VISIT, EST, LEVL III, 20-29 MIN: ICD-10-PCS | Mod: S$GLB,,, | Performed by: NURSE PRACTITIONER

## 2023-12-31 PROCEDURE — 99213 OFFICE O/P EST LOW 20 MIN: CPT | Mod: S$GLB,,, | Performed by: NURSE PRACTITIONER

## 2023-12-31 PROCEDURE — 87502 POCT INFLUENZA A/B MOLECULAR: ICD-10-PCS | Mod: QW,S$GLB,, | Performed by: NURSE PRACTITIONER

## 2023-12-31 PROCEDURE — 87502 INFLUENZA DNA AMP PROBE: CPT | Mod: QW,S$GLB,, | Performed by: NURSE PRACTITIONER

## 2023-12-31 PROCEDURE — 87811 SARS-COV-2 COVID19 W/OPTIC: CPT | Mod: QW,S$GLB,, | Performed by: NURSE PRACTITIONER

## 2023-12-31 RX ORDER — PROMETHAZINE HYDROCHLORIDE AND DEXTROMETHORPHAN HYDROBROMIDE 6.25; 15 MG/5ML; MG/5ML
5 SYRUP ORAL EVERY 4 HOURS PRN
Qty: 180 ML | Refills: 0 | Status: SHIPPED | OUTPATIENT
Start: 2023-12-31 | End: 2024-01-10

## 2023-12-31 RX ORDER — OSELTAMIVIR PHOSPHATE 75 MG/1
75 CAPSULE ORAL 2 TIMES DAILY
Qty: 10 CAPSULE | Refills: 0 | Status: SHIPPED | OUTPATIENT
Start: 2023-12-31 | End: 2024-01-05

## 2023-12-31 NOTE — PATIENT INSTRUCTIONS
"  Cough, unspecified type  -     SARS Coronavirus 2 Antigen, POCT Manual Read  -     POCT Influenza A/B MOLECULAR      Influenza A    -     oseltamivir (TAMIFLU) 75 MG capsule; Take 1 capsule (75 mg total) by mouth 2 (two) times daily. for 5 days  Dispense: 10 capsule; Refill: 0    Your Rapid FLU test was POSITIVE FOR INFLUENZA A    -  Take full course of Tamilfu as prescribed.  If symptoms began over 48 hours ago, you are not eligible for Tamiflu.  Symptomatic management is recommended as treatment.    - Rest at home.     - Drink plenty of fluids so you won't get dehydrated.    - Do not share any utensils or share drinks     - Wash hands frequently      Avoid taking Decongestants such as Sudafed, pseudoephedrine, phenylephrine or meds that say "cold," "sinus" or "-D".        - Cough recommendations:  Warm tea with honey can help with cough. Honey is a natural cough suppressant.    NIGHTTIME:  -     (PROMETHAZINE-DM) promethazine-dextromethorphan 6.25-15 mg/5 mL Syrp; Take 5 mLs by mouth every 4 (four) hours as needed (cough).  Dispense: 180 mL; Refill: 0      OR    DAYTIME:   Dextromethorphan (DM) is a cough suppressant over the counter (ie. mucinex DM or delsym).          -  Congestion recommendations:  take Mucinex (guaifenesin) twice a day to help loosen mucous.     - Fever/Pain recommendations:  Alternate Tylenol or Ibuprofen as directed for fever/pain.     Take ibuprofen/Motrin/Advil every 6-8 hours for pain and inflammation.  Do not take ibuprofen if you have a history of GI bleeding, kidney disease, or if you take blood thinners.    You can also take acetaminophen/Tylenol every 6-8 hours for added pain relief. Avoid tylenol if you have a history of liver disease.      - Sore throat recommendations: Warm fluids, warm salt water gargles, throat lozenges, tea, honey, soup, or drinking something cold or frozen.  Throat lozenges or sprays help reduce pain. Gargling with warm saltwater (1/4 teaspoon of salt in 1/2 " cup of warm water) or an OTC anesthetic gargle may be useful for irritation.    - Follow up with your PCP or specialty clinic as directed in the next 1-2 weeks if not improved or as needed.  You can call [phone] to schedule an appointment with the appropriate provider.      - If your condition worsens or fails to improve we recommend that you receive another evaluation at the ER immediately or contact your PCP to discuss your concerns or return here.    When to seek medical advice  Call your healthcare provider right away if any of these occur:  Fever that is poorly controlled with OTC fever reducing medication  New or worsening ear pain, sinus pain, or headache  Stiff neck  You can't swallow liquids or you can't open your mouth wide because of throat pain  Signs of dehydration. These include very dark urine or no urine, sunken eyes, and dizziness.  Trouble breathing or noisy breathing  Muffled voice  Rash

## 2023-12-31 NOTE — PROGRESS NOTES
"Subjective:      Patient ID: Nereida Narayanan is a 48 y.o. female.    Vitals:  height is 4' 11" (1.499 m) and weight is 103.9 kg (229 lb). Her oral temperature is 98 °F (36.7 °C). Her blood pressure is 136/87 and her pulse is 79. Her respiration is 16 and oxygen saturation is 97%.     Chief Complaint: Cough      This is a 48 y.o. female who presents today with a chief complaint of headache, cough, wheezing and sore throat for 3 days. Pt took nyquil and dayquil and had no relief.       Cough  This is a new problem. The problem has been unchanged. The problem occurs constantly. The cough is Productive of sputum. Associated symptoms include headaches, a sore throat and wheezing. Pertinent negatives include no chest pain, chills, ear congestion, ear pain, fever, heartburn, hemoptysis, myalgias, nasal congestion, postnasal drip, rash, rhinorrhea, shortness of breath, sweats or weight loss. She has tried OTC cough suppressant for the symptoms.     Constitution: Negative for chills, fatigue and fever.   HENT:  Positive for sore throat. Negative for ear pain, congestion, postnasal drip and sinus pain.    Cardiovascular:  Negative for chest pain.   Respiratory:  Positive for cough and wheezing. Negative for sputum production, bloody sputum and shortness of breath.    Gastrointestinal:  Negative for nausea, vomiting, diarrhea and heartburn.   Musculoskeletal:  Negative for muscle ache.   Skin:  Negative for rash.   Neurological:  Positive for headaches.      Objective:     Physical Exam   Constitutional: She is oriented to person, place, and time.   HENT:   Head: Normocephalic.   Ears:   Right Ear: Hearing and external ear normal. No no drainage, swelling or tenderness. Tympanic membrane is not erythematous, not retracted and not bulging. No middle ear effusion.   Left Ear: Hearing and external ear normal. No no drainage, swelling or tenderness. Tympanic membrane is not erythematous, not retracted and not bulging.  No middle " ear effusion.   Nose: Nose normal. No mucosal edema, rhinorrhea or purulent discharge. Right sinus exhibits no maxillary sinus tenderness and no frontal sinus tenderness. Left sinus exhibits no maxillary sinus tenderness and no frontal sinus tenderness.   Mouth/Throat: Uvula is midline, oropharynx is clear and moist and mucous membranes are normal. No trismus in the jaw. No uvula swelling. No oropharyngeal exudate, posterior oropharyngeal edema or posterior oropharyngeal erythema.   Cardiovascular: Normal rate and regular rhythm.   Pulmonary/Chest: Effort normal and breath sounds normal.   Neurological: She is alert and oriented to person, place, and time.   Skin: Skin is warm and dry.   Nursing note and vitals reviewed.      Assessment:     1. Cough, unspecified type    2. Influenza A        Plan:       Cough, unspecified type  -     SARS Coronavirus 2 Antigen, POCT Manual Read  -     POCT Influenza A/B MOLECULAR    Influenza A  -     oseltamivir (TAMIFLU) 75 MG capsule; Take 1 capsule (75 mg total) by mouth 2 (two) times daily. for 5 days  Dispense: 10 capsule; Refill: 0  -     promethazine-dextromethorphan (PROMETHAZINE-DM) 6.25-15 mg/5 mL Syrp; Take 5 mLs by mouth every 4 (four) hours as needed (cough).  Dispense: 180 mL; Refill: 0      Patient Instructions     Cough, unspecified type  -     SARS Coronavirus 2 Antigen, POCT Manual Read  -     POCT Influenza A/B MOLECULAR      Influenza A    -     oseltamivir (TAMIFLU) 75 MG capsule; Take 1 capsule (75 mg total) by mouth 2 (two) times daily. for 5 days  Dispense: 10 capsule; Refill: 0    Your Rapid FLU test was POSITIVE FOR INFLUENZA A    -  Take full course of Tamilfu as prescribed.  If symptoms began over 48 hours ago, you are not eligible for Tamiflu.  Symptomatic management is recommended as treatment.    - Rest at home.     - Drink plenty of fluids so you won't get dehydrated.    - Do not share any utensils or share drinks     - Wash hands  "frequently      Avoid taking Decongestants such as Sudafed, pseudoephedrine, phenylephrine or meds that say "cold," "sinus" or "-D".        - Cough recommendations:  Warm tea with honey can help with cough. Honey is a natural cough suppressant.    NIGHTTIME:  -     (PROMETHAZINE-DM) promethazine-dextromethorphan 6.25-15 mg/5 mL Syrp; Take 5 mLs by mouth every 4 (four) hours as needed (cough).  Dispense: 180 mL; Refill: 0      OR    DAYTIME:   Dextromethorphan (DM) is a cough suppressant over the counter (ie. mucinex DM or delsym).          -  Congestion recommendations:  take Mucinex (guaifenesin) twice a day to help loosen mucous.     - Fever/Pain recommendations:  Alternate Tylenol or Ibuprofen as directed for fever/pain.     Take ibuprofen/Motrin/Advil every 6-8 hours for pain and inflammation.  Do not take ibuprofen if you have a history of GI bleeding, kidney disease, or if you take blood thinners.    You can also take acetaminophen/Tylenol every 6-8 hours for added pain relief. Avoid tylenol if you have a history of liver disease.      - Sore throat recommendations: Warm fluids, warm salt water gargles, throat lozenges, tea, honey, soup, or drinking something cold or frozen.  Throat lozenges or sprays help reduce pain. Gargling with warm saltwater (1/4 teaspoon of salt in 1/2 cup of warm water) or an OTC anesthetic gargle may be useful for irritation.    - Follow up with your PCP or specialty clinic as directed in the next 1-2 weeks if not improved or as needed.  You can call [phone] to schedule an appointment with the appropriate provider.      - If your condition worsens or fails to improve we recommend that you receive another evaluation at the ER immediately or contact your PCP to discuss your concerns or return here.    When to seek medical advice  Call your healthcare provider right away if any of these occur:  Fever that is poorly controlled with OTC fever reducing medication  New or worsening " ear pain, sinus pain, or headache  Stiff neck  You can't swallow liquids or you can't open your mouth wide because of throat pain  Signs of dehydration. These include very dark urine or no urine, sunken eyes, and dizziness.  Trouble breathing or noisy breathing  Muffled voice  Rash

## 2023-12-31 NOTE — LETTER
December 31, 2023      [facility]  [address]  [place] [address]  Phone: [phone]  Fax: [phone]       Patient: Nereida Narayanan   YOB: 1975  Date of Visit: 12/31/2023    To Whom It May Concern:    Yasmine Narayanan  was at [facility] on 12/31/2023. The patient can return to work when the following conditions are met:  24 hours fever free without fever-reducing medications AND  Improved symptoms.    If you have any questions or concerns, or if I can be of further assistance, please do not hesitate to contact me.    Sincerely,    DULCE EspinalC

## 2024-01-05 ENCOUNTER — PATIENT MESSAGE (OUTPATIENT)
Dept: ADMINISTRATIVE | Facility: HOSPITAL | Age: 49
End: 2024-01-05
Payer: COMMERCIAL

## 2024-01-11 ENCOUNTER — PATIENT MESSAGE (OUTPATIENT)
Dept: SPORTS MEDICINE | Facility: CLINIC | Age: 49
End: 2024-01-11
Payer: COMMERCIAL

## 2024-01-22 ENCOUNTER — OFFICE VISIT (OUTPATIENT)
Dept: SPORTS MEDICINE | Facility: CLINIC | Age: 49
End: 2024-01-22
Payer: COMMERCIAL

## 2024-01-22 VITALS
BODY MASS INDEX: 46.73 KG/M2 | SYSTOLIC BLOOD PRESSURE: 143 MMHG | HEART RATE: 80 BPM | HEIGHT: 59 IN | WEIGHT: 231.81 LBS | DIASTOLIC BLOOD PRESSURE: 79 MMHG

## 2024-01-22 DIAGNOSIS — G89.29 CHRONIC PAIN OF BOTH KNEES: Primary | ICD-10-CM

## 2024-01-22 DIAGNOSIS — M17.0 PRIMARY OSTEOARTHRITIS OF BOTH KNEES: ICD-10-CM

## 2024-01-22 DIAGNOSIS — M25.562 CHRONIC PAIN OF BOTH KNEES: Primary | ICD-10-CM

## 2024-01-22 DIAGNOSIS — M25.369 KNEE INSTABILITY, UNSPECIFIED LATERALITY: ICD-10-CM

## 2024-01-22 DIAGNOSIS — M25.561 CHRONIC PAIN OF BOTH KNEES: Primary | ICD-10-CM

## 2024-01-22 PROCEDURE — 99999 PR PBB SHADOW E&M-EST. PATIENT-LVL III: CPT | Mod: PBBFAC,,, | Performed by: ORTHOPAEDIC SURGERY

## 2024-01-22 PROCEDURE — 99214 OFFICE O/P EST MOD 30 MIN: CPT | Mod: S$GLB,,, | Performed by: ORTHOPAEDIC SURGERY

## 2024-01-22 NOTE — PROGRESS NOTES
CC: bilateral knee pain    Nereida is here today for follow up evaluation of her bilateral knee pain. Patient reports her pain is 8/10 today. Her most recent bilateral knee CSI was 12/11/2023 and she admits to appreciating 2 weeks of improvement in her pain following. She states her insurance recently changed and she is interested in trying to get viscosupplementation authorized. She denies new falls or trauma since her last visit. She states that as her pain has gotten worse she has also been feeling more unstable especially with long periods of standing at work and [place].    Recall from visit on 12/11/2023  Nereida is here today for follow up evaluation of her bilateral knee pain. Patient reports her pain is 10/10 today. Her most recent bilateral knee CSI was 09/01/2023 and she admits to appreciating approximately 2 weeks of improvement in her pain following. She endorses increased pain beginning 12/06/2023 without new mechanism of injury. The increased right knee pain occurred after her typical work shift, where she noticed increase pain and swelling, making it hard for her to ambulate. Patient treated pain with rest, elevation, and Epson salt. Was able to ambulate better on her way to her appointment today than in recent days. Denies recent trauma, fevers, chills. Endorsed some radiation down her right leg, which soon self resolved. Has been taking Aleve with no relief of pain.     Recall from visit on 09/01/2023  Nereida is here today for follow up evaluation of her bilateral knee pain. Patient reports her pain is 8/10 today. Recall she had right knee intra-articular ACP injection 11 weeks ago and states her pain is improved but not resolved. She states her left knee pain has been severe and admits to feeling swollen bilaterally today. She denies new falls or trauma since her last visit. She would like to discuss possible injection options today.     Recall  from visit on 2023  48 y.o. Female presents today for evaluation of her bilateral R>L knee pain. The pain is located mostly medial of bilateral knees. Patient does have plans to be seen in bariatric clinic in  for evaluation of potential surgery. She is interested in any treatment options that could help the pain.  How long: Multiple year history  What makes it better: advil, aleve, heat  What makes it worse: exertion and weightbearing  Does it radiate: No  Attempted treatments: Recently received bilateral triamcinolone intra-articular steroid injections to knees in 2022 with 2 weeks of partial relief. Also tried PT in  with limited relief.  Pain score: Currently 5/10 which for her is low. Can be 8/10 at times  Any mechanical symptoms: Denies  Feelings of instability: Denies  Affect on ADLs: She is not as active as she wishes due to knee pain.    Occupation: Works at [employer]    PAST MEDICAL HISTORY:   Past Medical History:   Diagnosis Date    Allergy     Morbid obesity with BMI of 45.0-49.9, adult        PAST SURGICAL HISTORY:   Past Surgical History:   Procedure Laterality Date    ARTHROSCOPIC CHONDROPLASTY OF KNEE JOINT Right 2020    Procedure: ARTHROSCOPY, KNEE, WITH CHONDROPLASTY;  Surgeon: Aiden Clarke DO;  Location: [facility] OR;  Service: Orthopedics;  Laterality: Right;     SECTION      KNEE ARTHROSCOPY W/ MENISCECTOMY Right 2020    Procedure: ARTHROSCOPY, KNEE, WITH MENISCECTOMY;  Surgeon: Aiden Clarke DO;  Location: [facility] OR;  Service: Orthopedics;  Laterality: Right;  Equipment: Scope; [employer] Tr[employer]par Meniscus Repair System; Maria Eugenia chondral drill set  Vendor: [employer]; [place]  Table: Supine    KNEE ARTHROSCOPY W/ PLICA EXCISION Right 2020    Procedure: EXCISION, PLICA, KNEE, ARTHROSCOPIC;  Surgeon: Aiden Clarke DO;  Location: [facility] OR;  Service: Orthopedics;  Laterality: Right;       FAMILY HISTORY:   Family History   Problem Relation Age of Onset     Hypertension Mother     Alcohol abuse Father         none    Breast cancer Neg Hx     Ovarian cancer Neg Hx        SOCIAL HISTORY:   Social History     Socioeconomic History    Marital status: Single   Tobacco Use    Smoking status: Never    Smokeless tobacco: Never   Substance and Sexual Activity    Alcohol use: No    Drug use: No    Sexual activity: Yes     Partners: Male     Birth control/protection: None     Social Determinants of Health     Financial Resource Strain: Low Risk  (12/11/2023)    Overall Financial Resource Strain (CARDIA)     Difficulty of Paying Living Expenses: Not hard at all   Food Insecurity: No Food Insecurity (12/11/2023)    Hunger Vital Sign     Worried About Running Out of Food in the Last Year: Never true     Ran Out of Food in the Last Year: Never true   Transportation Needs: No Transportation Needs (12/11/2023)    PRAPARE - Transportation     Lack of Transportation (Medical): No     Lack of Transportation (Non-Medical): No   Physical Activity: Sufficiently Active (12/11/2023)    Exercise Vital Sign     Days of Exercise per Week: 5 days     Minutes of Exercise per Session: 30 min   Stress: No Stress Concern Present (12/11/2023)    [place] [employer] - Occupational Stress Questionnaire     Feeling of Stress : Not at all   Social Connections: Unknown (12/11/2023)    Social Connection and Isolation Panel [NHANES]     Frequency of Communication with Friends and Family: Twice a week     Frequency of Social Gatherings with Friends and Family: Twice a week     Active Member of Clubs or Organizations: No     Attends Club or Organization Meetings: 1 to 4 times per year     Marital Status: Never    Housing Stability: Low Risk  (12/11/2023)    Housing Stability Vital Sign     Unable to Pay for Housing in the Last Year: No     Number of Places Lived in the Last Year: 1     Unstable Housing in the Last Year: No       MEDICATIONS:     Current Outpatient Medications:      "diclofenac sodium (VOLTAREN) 1 % Gel, Apply 2 g topically 4 (four) times daily., Disp: 20 g, Rfl: 2    furosemide (LASIX) 40 MG tablet, Take 1 tablet (40 mg total) by mouth once daily., Disp: 30 tablet, Rfl: 3    gabapentin (NEURONTIN) 100 MG capsule, Take 1 capsule (100 mg total) by mouth 3 (three) times daily., Disp: 90 capsule, Rfl: 6    diclofenac (VOLTAREN) 75 MG EC tablet, Take 1 tablet (75 mg total) by mouth 2 (two) times daily., Disp: 60 tablet, Rfl: 0    methocarbamoL (ROBAXIN) 500 MG Tab, Take 1 tablet (500 mg total) by mouth 3 (three) times daily as needed (pain)., Disp: 30 tablet, Rfl: 0    ALLERGIES:   Review of patient's allergies indicates:   Allergen Reactions    Adhesive      Causes skin tearing and redness         PHYSICAL EXAMINATION:  BP (!) 143/79   Pulse 80   Ht 4' 11" (1.499 m)   Wt 105.2 kg (231 lb 13 oz)   LMP 12/31/2023   BMI 46.82 kg/m²   Vitals signs and nursing note have been reviewed.  General: In no acute distress, well developed, well nourished, no diaphoresis  Eyes: EOM full and smooth, no eye redness or discharge  HENT: normocephalic and atraumatic, neck supple, trachea midline, no nasal discharge, no external ear redness or discharge  Cardiovascular: 2+ and symmetric DP pulses bilaterally, no LE edema  Lungs: respirations non-labored, no conversational dyspnea   Abd: non-distended, no rigidity  MSK: no amputation or deformity, no swelling of extremities  Neuro: AAOx3, CN2-12 grossly intact  Skin: No rashes, warm and dry  Psychiatric: cooperative, pleasant, mood and affect appropriate for age    MUSCULOSKELETAL EXAM:    BILATERAL KNEE EXAMINATION   Affected side is compared to contralateral knee     Observation:  No edema, erythema, ecchymosis noted.  Suprapatellar effusion bilaterally but more pronounced on right  No muscle atrophy of the thighs and calves noted.  No obvious bony deformities noted.   No genu valgus/varum noted. No recurvatum noted.      Tenderness:  Patella - " none    Lateral joint line - none  Quad tendon - none   Medial joint line - + bilaterally  Patellar tendon - none  Medial plica - none  Tibial tubercle - none   Lateral plica - none  Pes anserine - + b/l   MCL prox - none  Distal ITB - none   MCL distal - none  MFC - none    LCL prox - none  LFC - none    LCL distal - none  Tibia - none    Fibula - none    No obvious bursae, plicae, popliteal cysts, or tendon derangement palpated.          ROM:   Active extension to 0° on left without hyperextension, lag, or patellar J sign.   Active extension to 0° on right without hyperextension, lag, or patellar J sign.   Slight crepitus with extension bilaterally  Active flexion to 120° on left and 120° on right.    Strength: (bilaterally)  Knee Flexion - 5/5 on left and 5/5 on right  Knee Extension - 5/5 on left and 5/5 on right  Hip Flexion - 5/5 on left and 5/5 on right  Hip Extension - 5/5 on left and 5/5 on right  Ankle dorsiflexion - 5/5 on left and 5/5 on right  Ankle Plantarflexion - 5/5 on left and 5/5 on right    Patellofemoral Exam:  Patellar ballottement - negative  Bulge sign - negative  Patellar grind - negative on left, positive on right  No patellar laxity with medial and lateral translation   No apprehension with medial and lateral patellar translation.     Meniscus Testing:     No pain with terminal extension and flexion.  Shashas test - negative   Bounce home test - negative    Ligament Testing:  Lachman's test - negative  No laxity with varus testing at 0 and 30 degrees.  No laxity with valgus testing at 0 and 30 degrees.    Neurovascular Examination:   Antalgic gait  Sensation intact to light touch in the obturator, lateral/intermediate/medial/posterior femoral cutaneous, saphenous, and common peroneal nerves bilaterally.  Pulses intact at the DP and PT arteries bilaterally.    Capillary refill intact <2 seconds in all toes bilaterally.      IMAGIN. X-ray obtained on 2022 due to bilateral knee  pain  2. X-ray images were reviewed personally by me and then directly with patient.  3. FINDINGS: X-ray images obtained demonstrate tricompartmental degenerative changes, most pronounced in the medial compartment bilaterally.  Kellgren and Clay grade 3  4. IMPRESSION: As above.       ASSESSMENT:      ICD-10-CM ICD-9-CM   1. Chronic pain of both knees  M25.561 719.46    M25.562 338.29    G89.29    2. Primary osteoarthritis of both knees  M17.0 715.16   3. Knee instability, unspecified laterality  M25.369 718.86         PLAN:  1-2.  Chronic bilateral knee pain/osteoarthritis - improved, recent exacerbation    - Nereida is following up today for her chronic bilateral knee pain which has been slowly worsening over several years.  She has also been appreciating increased swelling and instability which worsens with walking and long periods of standing at work.    - Her most recent bilateral knee CSI was 12/11/2023 and she admits to appreciating approximately 2 weeks of improvement in her pain following.     - We reviewed the natural history of osteoarthritis and a multipronged treatment approach. We reviewed the importance of addressing three different aspects to best manage this condition. Controlling the intra-articular immune reaction through medications and/or injections, improving local stability through bracing and/or injection, and improving functional stability through hip, core, and ankle strength, stability and mobility which may benefit from formal physical therapy.    - Will try diclofenac 75 mg twice daily for pain and swelling of the knees.    - Robaxin 500 mg tabs for nighttime use as needed.    - Prior authorization for bilateral knee Euflexxa series started today.    - MEDICAL NECESSITY FOR VISCOSUPPLEMETNATION: After thorough evaluation of the patient, I have determined that visco-supplementation is medically necessary. The patient has painful DJD of the knee with failure of conservative treatments  including lifestyle modifications and rehabilitation exercises.  Oral analgesics/NSAIDs have not adequately controlled symptoms and there is radiographic evidence of Kellgren Clay grade 2 or greater OA changes, or in lack of radiographic evidence, there is arthroscopic or other evidence of chondrosis.     - Patient fitted for bilateral knee medial  braces today by DME.       Future planning includes - viscosupplementation injection series, repeat CSI in 3 months if helpful, physical therapy    All questions were answered to the best of my ability and all concerns were addressed at this time.    Follow up in 2-3 weeks for above, or sooner if needed.      Total time spent face-to face with patient counseling or coordinating care including prognosis, differential diagnosis, risks and benefits of treatment, instructions, compliance risk reductions as well as non-face-to-face time personally spent reviewing medial record, medical documentation, and coordination of care.     EST MINUTES X   39396 10-19    47933 20-29    62815 30-39 X   99215 40-54    NEW     21538 15-29    44526 30-44    37229 45-59    89162 60-74    PHONE      5-10    20033 11-20    00890 21-30

## 2024-01-23 ENCOUNTER — PATIENT MESSAGE (OUTPATIENT)
Dept: SPORTS MEDICINE | Facility: CLINIC | Age: 49
End: 2024-01-23
Payer: COMMERCIAL

## 2024-01-23 RX ORDER — METHOCARBAMOL 500 MG/1
500 TABLET, FILM COATED ORAL 3 TIMES DAILY PRN
Qty: 30 TABLET | Refills: 0 | Status: SHIPPED | OUTPATIENT
Start: 2024-01-23 | End: 2024-02-20 | Stop reason: SDUPTHER

## 2024-01-23 RX ORDER — DICLOFENAC SODIUM 75 MG/1
75 TABLET, DELAYED RELEASE ORAL 2 TIMES DAILY
Qty: 60 TABLET | Refills: 0 | Status: SHIPPED | OUTPATIENT
Start: 2024-01-23 | End: 2024-03-05 | Stop reason: SDUPTHER

## 2024-01-30 ENCOUNTER — OFFICE VISIT (OUTPATIENT)
Dept: SPORTS MEDICINE | Facility: CLINIC | Age: 49
End: 2024-01-30
Payer: COMMERCIAL

## 2024-01-30 VITALS
SYSTOLIC BLOOD PRESSURE: 148 MMHG | DIASTOLIC BLOOD PRESSURE: 79 MMHG | HEART RATE: 94 BPM | WEIGHT: 228.81 LBS | HEIGHT: 59 IN | BODY MASS INDEX: 46.13 KG/M2

## 2024-01-30 DIAGNOSIS — M25.461 BILATERAL KNEE EFFUSIONS: ICD-10-CM

## 2024-01-30 DIAGNOSIS — M25.561 CHRONIC PAIN OF BOTH KNEES: Primary | ICD-10-CM

## 2024-01-30 DIAGNOSIS — M25.562 CHRONIC PAIN OF BOTH KNEES: Primary | ICD-10-CM

## 2024-01-30 DIAGNOSIS — M17.0 PRIMARY OSTEOARTHRITIS OF BOTH KNEES: ICD-10-CM

## 2024-01-30 DIAGNOSIS — M25.462 BILATERAL KNEE EFFUSIONS: ICD-10-CM

## 2024-01-30 DIAGNOSIS — G89.29 CHRONIC PAIN OF BOTH KNEES: Primary | ICD-10-CM

## 2024-01-30 PROCEDURE — 20611 DRAIN/INJ JOINT/BURSA W/US: CPT | Mod: 50,S$GLB,, | Performed by: ORTHOPAEDIC SURGERY

## 2024-01-30 PROCEDURE — 99999 PR PBB SHADOW E&M-EST. PATIENT-LVL III: CPT | Mod: PBBFAC,,, | Performed by: ORTHOPAEDIC SURGERY

## 2024-01-30 PROCEDURE — 99499 UNLISTED E&M SERVICE: CPT | Mod: S$GLB,,, | Performed by: ORTHOPAEDIC SURGERY

## 2024-01-30 NOTE — LETTER
January 30, 2024      [facility]  [address]  [place] [address]  Phone: [phone]  Fax: [phone]       Patient: Nereida Narayanan   YOB: 1975  Date of Visit: 01/30/2024    To Whom It May Concern:    Yasmine Narayanan  was at [facility] on 01/30/2024. Please excuse her from work missed today due to her appointment. If you have any questions or concerns, or if I can be of further assistance, please do not hesitate to contact me.    Sincerely,      Dr. Tang Allison, DO

## 2024-01-30 NOTE — PROGRESS NOTES
"CC: Bilateral knee pain    HPI: Nereida is here today for bilateral knee aspiration attempts under ultrasound guidance as she is not yet eligible for VSI or CSI. She reports her pain is 8/10 today. Recall her most recent bilateral knee CSI was 12/11/2023 and as such is not eligible for CSI today.     PROCEDURE:  Large Joint Aspiration/Injection  Knee joint, bilateral  Performed by: PEYMAN MCGUIRE  Authorized by: PEYMAN MCGUIRE  Consent Done?: Yes (Verbal)  Indications: Pain and joint effusion  Site marked: The procedure site was marked   Timeout: Prior to procedure the correct patient, procedure, and site was verified   Location: Knee joint, bilateral  Prep: Patient was prepped with Chlorhexidine and alcohol.  Ultrasound Guidance for needle placement: yes    Procedure RIGHT: Ethyl Chloride spray was used prior to skin puncture. After cold spray was applied, 2-4 cc's of 0.2% ropivacaine was injected into the skin and superficial tissue at the injection site using a 25G, 1.5" needle to form an anesthetic tunnel and ensure proper placement of the needle into the joint space. After local anesthetic was applied an 18G, 1.5 needle was used to enter the knee joint capsule under US guidance. 24 cc of clear synovial fluid was aspirated.     Procedure LEFT: Ethyl Chloride spray was used prior to skin puncture. After cold spray was applied, 2-4 cc's of 0.2% ropivacaine was injected into the skin and superficial tissue at the injection site using a 25G, 1.5" needle to form an anesthetic tunnel and ensure proper placement of the needle into the joint space. After local anesthetic was applied an 18G, 1.5 needle was used to enter the knee joint capsule under US guidance. 4 cc of clear synovial fluid was aspirated.     Approach: superolateral  Patient tolerance: Patient tolerated the procedure well with no immediate complications. Improvement noted after aspiration. Patient was wrapped following bandaging.    Ultrasound " guidance was used for needle localization with SonoSite Edge 2, 15-6 MHz (L)probe(s). Images were saved and stored for documentation. Short and long axis images of the anterior knee were taken prior to injection confirming presence of joint effusion. The knee joint well visualized. Dynamic visualization of the needles was continuous throughout the procedure and maintained good position and correct needle placement.    ASSESSMENT:  1. Chronic pain of both knees    2. Primary osteoarthritis of both knees    3. Bilateral knee effusions        PLAN:   1.  Ultrasound-guided bilateral knee aspiration performed today. No immediate complications and the procedure was tolerated well.  2.  All questions were answered to the best of my ability and all concerns were addressed at this time.  2.  Follow up with for bilateral knee viscosupplementation pending insurance approval

## 2024-02-06 ENCOUNTER — TELEPHONE (OUTPATIENT)
Dept: BARIATRICS | Facility: CLINIC | Age: 49
End: 2024-02-06
Payer: COMMERCIAL

## 2024-02-19 ENCOUNTER — TELEPHONE (OUTPATIENT)
Dept: SPORTS MEDICINE | Facility: CLINIC | Age: 49
End: 2024-02-19
Payer: COMMERCIAL

## 2024-02-19 DIAGNOSIS — G89.29 CHRONIC PAIN OF BOTH KNEES: Primary | ICD-10-CM

## 2024-02-19 DIAGNOSIS — M25.562 CHRONIC PAIN OF BOTH KNEES: Primary | ICD-10-CM

## 2024-02-19 DIAGNOSIS — M17.0 PRIMARY OSTEOARTHRITIS OF BOTH KNEES: ICD-10-CM

## 2024-02-19 DIAGNOSIS — M25.561 CHRONIC PAIN OF BOTH KNEES: Primary | ICD-10-CM

## 2024-02-19 NOTE — TELEPHONE ENCOUNTER
Viscosupplementation drug updated to preferred brand.    Pennie Krishnan MS, OTC  Clinical Assistant to Dr. Tang Allison

## 2024-02-20 ENCOUNTER — PATIENT MESSAGE (OUTPATIENT)
Dept: SPORTS MEDICINE | Facility: CLINIC | Age: 49
End: 2024-02-20
Payer: COMMERCIAL

## 2024-02-20 ENCOUNTER — OFFICE VISIT (OUTPATIENT)
Dept: SPORTS MEDICINE | Facility: CLINIC | Age: 49
End: 2024-02-20
Payer: COMMERCIAL

## 2024-02-20 VITALS
HEART RATE: 75 BPM | WEIGHT: 228.75 LBS | BODY MASS INDEX: 46.12 KG/M2 | DIASTOLIC BLOOD PRESSURE: 77 MMHG | HEIGHT: 59 IN | SYSTOLIC BLOOD PRESSURE: 146 MMHG

## 2024-02-20 DIAGNOSIS — M17.0 PRIMARY OSTEOARTHRITIS OF BOTH KNEES: ICD-10-CM

## 2024-02-20 DIAGNOSIS — G89.29 CHRONIC PAIN OF BOTH KNEES: Primary | ICD-10-CM

## 2024-02-20 DIAGNOSIS — M25.562 CHRONIC PAIN OF BOTH KNEES: Primary | ICD-10-CM

## 2024-02-20 DIAGNOSIS — M25.561 CHRONIC PAIN OF BOTH KNEES: Primary | ICD-10-CM

## 2024-02-20 PROCEDURE — 99214 OFFICE O/P EST MOD 30 MIN: CPT | Mod: 25,S$GLB,, | Performed by: ORTHOPAEDIC SURGERY

## 2024-02-20 PROCEDURE — 99999 PR PBB SHADOW E&M-EST. PATIENT-LVL III: CPT | Mod: PBBFAC,,, | Performed by: ORTHOPAEDIC SURGERY

## 2024-02-20 PROCEDURE — 20611 DRAIN/INJ JOINT/BURSA W/US: CPT | Mod: 50,S$GLB,, | Performed by: ORTHOPAEDIC SURGERY

## 2024-02-20 RX ORDER — KETOROLAC TROMETHAMINE 30 MG/ML
30 INJECTION, SOLUTION INTRAMUSCULAR; INTRAVENOUS
Status: COMPLETED | OUTPATIENT
Start: 2024-02-20 | End: 2024-02-20

## 2024-02-20 RX ORDER — KETOROLAC TROMETHAMINE 30 MG/ML
30 INJECTION, SOLUTION INTRAMUSCULAR; INTRAVENOUS
Status: CANCELLED | OUTPATIENT
Start: 2024-02-20 | End: 2024-02-20

## 2024-02-20 RX ORDER — METHOCARBAMOL 500 MG/1
500 TABLET, FILM COATED ORAL 3 TIMES DAILY PRN
Qty: 45 TABLET | Refills: 0 | Status: SHIPPED | OUTPATIENT
Start: 2024-02-20 | End: 2024-04-10 | Stop reason: SDUPTHER

## 2024-02-20 RX ADMIN — KETOROLAC TROMETHAMINE 30 MG: 30 INJECTION, SOLUTION INTRAMUSCULAR; INTRAVENOUS at 02:02

## 2024-02-20 RX ADMIN — KETOROLAC TROMETHAMINE 30 MG: 30 INJECTION, SOLUTION INTRAMUSCULAR; INTRAVENOUS at 01:02

## 2024-02-20 NOTE — PROGRESS NOTES
CC: bilateral knee pain    Nereida is here today for follow up evaluation of her bilateral knee pain. Patient reports her pain is 8/10 today. She had bilateral knee aspirations at visit 01/30/2024 without CSI as she was not eligible at that time. Her most recent bilateral knee CSI was 12/11/2023. She is interested in discussing other possible treatment options prior to her scheduled viscosupplementation injections in a few weeks.     Recall from visit on 01/22/2024  Nereida is here today for follow up evaluation of her bilateral knee pain. Patient reports her pain is 8/10 today. Her most recent bilateral knee CSI was 12/11/2023 and she admits to appreciating 2 weeks of improvement in her pain following. She states her insurance recently changed and she is interested in trying to get viscosupplementation authorized. She denies new falls or trauma since her last visit. She states that as her pain has gotten worse she has also been feeling more unstable especially with long periods of standing at work and [place].    Recall from visit on 12/11/2023  Nereida is here today for follow up evaluation of her bilateral knee pain. Patient reports her pain is 10/10 today. Her most recent bilateral knee CSI was 09/01/2023 and she admits to appreciating approximately 2 weeks of improvement in her pain following. She endorses increased pain beginning 12/06/2023 without new mechanism of injury. The increased right knee pain occurred after her typical work shift, where she noticed increase pain and swelling, making it hard for her to ambulate. Patient treated pain with rest, elevation, and Epson salt. Was able to ambulate better on her way to her appointment today than in recent days. Denies recent trauma, fevers, chills. Endorsed some radiation down her right leg, which soon self resolved. Has been taking Aleve with no relief of pain.     Recall from visit on 09/01/2023  Nereida is  here today for follow up evaluation of her bilateral knee pain. Patient reports her pain is 8/10 today. Recall she had right knee intra-articular ACP injection 11 weeks ago and states her pain is improved but not resolved. She states her left knee pain has been severe and admits to feeling swollen bilaterally today. She denies new falls or trauma since her last visit. She would like to discuss possible injection options today.     Recall from visit on 2023  48 y.o. Female presents today for evaluation of her bilateral R>L knee pain. The pain is located mostly medial of bilateral knees. Patient does have plans to be seen in bariatric clinic in  for evaluation of potential surgery. She is interested in any treatment options that could help the pain.  How long: Multiple year history  What makes it better: advil, aleve, heat  What makes it worse: exertion and weightbearing  Does it radiate: No  Attempted treatments: Recently received bilateral triamcinolone intra-articular steroid injections to knees in 2022 with 2 weeks of partial relief. Also tried PT in  with limited relief.  Pain score: Currently 5/10 which for her is low. Can be 8/10 at times  Any mechanical symptoms: Denies  Feelings of instability: Denies  Affect on ADLs: She is not as active as she wishes due to knee pain.    Occupation: Works at airport      PAST MEDICAL HISTORY:   Past Medical History:   Diagnosis Date    Allergy     Morbid obesity with BMI of 45.0-49.9, adult        PAST SURGICAL HISTORY:   Past Surgical History:   Procedure Laterality Date    ARTHROSCOPIC CHONDROPLASTY OF KNEE JOINT Right 2020    Procedure: ARTHROSCOPY, KNEE, WITH CHONDROPLASTY;  Surgeon: Aiden Clarke DO;  Location: [facility] OR;  Service: Orthopedics;  Laterality: Right;     SECTION      KNEE ARTHROSCOPY W/ MENISCECTOMY Right 2020    Procedure: ARTHROSCOPY, KNEE, WITH MENISCECTOMY;  Surgeon: Aiden Clarke DO;  Location: [facility] OR;   Service: Orthopedics;  Laterality: Right;  Equipment: Scope; [employer] Truspar Meniscus Repair System; Maria Eugenia chondral drill set  Vendor: [employer]; [place]  Table: Supine    KNEE ARTHROSCOPY W/ PLICA EXCISION Right 9/9/2020    Procedure: EXCISION, PLICA, KNEE, ARTHROSCOPIC;  Surgeon: Aiden Clarke DO;  Location: [facility] OR;  Service: Orthopedics;  Laterality: Right;       FAMILY HISTORY:   Family History   Problem Relation Age of Onset    Hypertension Mother     Alcohol abuse Father         none    Breast cancer Neg Hx     Ovarian cancer Neg Hx        SOCIAL HISTORY:   Social History     Socioeconomic History    Marital status: Single   Tobacco Use    Smoking status: Never    Smokeless tobacco: Never   Substance and Sexual Activity    Alcohol use: No    Drug use: No    Sexual activity: Yes     Partners: Male     Birth control/protection: None     Social Determinants of Health     Financial Resource Strain: Low Risk  (12/11/2023)    Overall Financial Resource Strain (CARDIA)     Difficulty of Paying Living Expenses: Not hard at all   Food Insecurity: No Food Insecurity (12/11/2023)    Hunger Vital Sign     Worried About Running Out of Food in the Last Year: Never true     Ran Out of Food in the Last Year: Never true   Transportation Needs: No Transportation Needs (12/11/2023)    PRAPARE - Transportation     Lack of Transportation (Medical): No     Lack of Transportation (Non-Medical): No   Physical Activity: Sufficiently Active (12/11/2023)    Exercise Vital Sign     Days of Exercise per Week: 5 days     Minutes of Exercise per Session: 30 min   Stress: No Stress Concern Present (12/11/2023)    [place] [place] [employer] - Occupational Stress Questionnaire     Feeling of Stress : Not at all   Social Connections: Unknown (12/11/2023)    Social Connection and Isolation Panel [NHANES]     Frequency of Communication with Friends and Family: Twice a week     Frequency of Social Gatherings with Friends and Family:  "Twice a week     Active Member of Clubs or Organizations: No     Attends Club or Organization Meetings: 1 to 4 times per year     Marital Status: Never    Housing Stability: Low Risk  (12/11/2023)    Housing Stability Vital Sign     Unable to Pay for Housing in the Last Year: No     Number of Places Lived in the Last Year: 1     Unstable Housing in the Last Year: No       MEDICATIONS:     Current Outpatient Medications:     diclofenac (VOLTAREN) 75 MG EC tablet, Take 1 tablet (75 mg total) by mouth 2 (two) times daily., Disp: 60 tablet, Rfl: 0    diclofenac sodium (VOLTAREN) 1 % Gel, Apply 2 g topically 4 (four) times daily., Disp: 20 g, Rfl: 2    furosemide (LASIX) 40 MG tablet, Take 1 tablet (40 mg total) by mouth once daily., Disp: 30 tablet, Rfl: 3    gabapentin (NEURONTIN) 100 MG capsule, Take 1 capsule (100 mg total) by mouth 3 (three) times daily., Disp: 90 capsule, Rfl: 6    methocarbamoL (ROBAXIN) 500 MG Tab, Take 1 tablet (500 mg total) by mouth 3 (three) times daily as needed (pain)., Disp: 45 tablet, Rfl: 0  No current facility-administered medications for this visit.    ALLERGIES:   Review of patient's allergies indicates:   Allergen Reactions    Adhesive      Causes skin tearing and redness         PHYSICAL EXAMINATION:  BP (!) 146/77   Pulse 75   Ht 4' 11" (1.499 m)   Wt 103.8 kg (228 lb 11.6 oz)   BMI 46.20 kg/m²   Vitals signs and nursing note have been reviewed.  General: In no acute distress, well developed, well nourished, no diaphoresis  Eyes: EOM full and smooth, no eye redness or discharge  HENT: normocephalic and atraumatic, neck supple, trachea midline, no nasal discharge, no external ear redness or discharge  Cardiovascular: 2+ and symmetric DP pulses bilaterally, no LE edema  Lungs: respirations non-labored, no conversational dyspnea   Abd: non-distended, no rigidity  MSK: no amputation or deformity, no swelling of extremities  Neuro: AAOx3, CN2-12 grossly intact  Skin: No " rashes, warm and dry  Psychiatric: cooperative, pleasant, mood and affect appropriate for age    MUSCULOSKELETAL EXAM:    BILATERAL KNEE EXAMINATION   Affected side is compared to contralateral knee     Observation:  No edema, erythema, ecchymosis noted.  Suprapatellar effusion on the right  No muscle atrophy of the thighs and calves noted.  No obvious bony deformities noted.   No genu valgus/varum noted. No recurvatum noted.      Tenderness:  Patella - none    Lateral joint line - none  Quad tendon - none   Medial joint line - + bilaterally  Patellar tendon - none  Medial plica - none  Tibial tubercle - none   Lateral plica - none  Pes anserine - + b/l   MCL prox - none  Distal ITB - none   MCL distal - none  MFC - none    LCL prox - none  LFC - none    LCL distal - none  Tibia - none    Fibula - none    No obvious bursae, plicae, popliteal cysts, or tendon derangement palpated.          ROM:   Active extension to 0° on left without hyperextension, lag, or patellar J sign.   Active extension to 0° on right without hyperextension, lag, or patellar J sign.   + crepitus with extension bilaterally  Active flexion to 120° on left and 120° on right.    Strength: (bilaterally)  Knee Flexion - 5/5 on left and 5/5 on right  Knee Extension - 5/5 on left and 5/5 on right  Hip Flexion - 5/5 on left and 5/5 on right  Hip Extension - 5/5 on left and 5/5 on right  Ankle dorsiflexion - 5/5 on left and 5/5 on right  Ankle Plantarflexion - 5/5 on left and 5/5 on right    Patellofemoral Exam:  Patellar ballottement - negative  Bulge sign - negative  Patellar grind - negative on left, positive on right  No patellar laxity with medial and lateral translation   No apprehension with medial and lateral patellar translation.     Ligament Testing:  Lachman's test - negative  No laxity with varus testing at 0 and 30 degrees.  No laxity with valgus testing at 0 and 30 degrees.    Neurovascular Examination:   Antalgic gait  Sensation intact to  "light touch in the obturator, lateral/intermediate/medial/posterior femoral cutaneous, saphenous, and common peroneal nerves bilaterally.  Pulses intact at the DP and PT arteries bilaterally.    Capillary refill intact <2 seconds in all toes bilaterally.      IMAGIN. X-ray obtained on 2022 due to bilateral knee pain  2. X-ray images were reviewed personally by me and then directly with patient.  3. FINDINGS: X-ray images obtained demonstrate tricompartmental degenerative changes, most pronounced in the medial compartment bilaterally.  Kellgren and Caly grade 3  4. IMPRESSION: As above.     PROCEDURE:  Large Joint Aspiration/Injection  Knee joint, bilateral  Performed by: PEYMAN MCGUIRE  Authorized by: PEYMAN MCGUIRE  Consent Done?: Yes (Verbal)  Indications: Pain and joint effusion  Site marked: The procedure site was marked   Timeout: Prior to procedure the correct patient, procedure, and site was verified   Location: Knee joint, bilateral  Prep: Patient was prepped with Chlorhexidine and alcohol.  Ultrasound Guidance for needle placement: yes    Procedure RIGHT: Ethyl Chloride spray was used prior to skin puncture. After cold spray was applied, 2-4 cc's of 0.2% ropivacaine was injected into the skin and superficial tissue at the injection site using a 25G, 1.5" needle to form an anesthetic tunnel and ensure proper placement of the needle into the joint space. After local anesthetic was applied an 18G, 1.5 needle was used to enter the knee joint capsule under US guidance. 26 cc of clear synovial fluid was aspirated. Following aspiration, a 3 cc mixture of 1 cc of 40 mg/ml triamcinolone acetonide and 2 cc of 0.2% ropivacaine were injected into the knee joint.     Procedure LEFT: Ethyl Chloride spray was used prior to skin puncture. After cold spray was applied, 2-4 cc's of 0.2% ropivacaine was injected into the skin and superficial tissue at the injection site using a 25G, 1.5" needle to form an " anesthetic tunnel and ensure proper placement of the needle into the joint space. After local anesthetic was applied an 18G, 1.5 needle was used to enter the knee joint capsule under US guidance. 7 cc of clear synovial fluid was aspirated. Following aspiration, a 3 cc mixture of 1 cc of 40 mg/ml triamcinolone acetonide and 2 cc of 0.2% ropivacaine were injected into the knee joint.     Approach: superiorlateral  Medications: Ketorolac tromethamine 30 mg/mL  Patient tolerance: Patient tolerated the procedure well with no immediate complications. Improvement noted after aspiration. Patient was wrapped following bandaging.    Ultrasound guidance was used for needle localization with SonMy Pick Box Edge 2, 15-6 MHz (L)probe(s). Images were saved and stored for documentation. Short and long axis images of the anterior knee were taken prior to injection confirming presence of joint effusion. The knee joint well visualized. Dynamic visualization of the needles was continuous throughout the procedure and maintained good position and correct needle placement.    Ketorolac tromethamine:  NDC: 39165-967-03  LOT: [id number]  EXP: 05/2024        ASSESSMENT:      ICD-10-CM ICD-9-CM   1. Chronic pain of both knees  M25.561 719.46    M25.562 338.29    G89.29    2. Primary osteoarthritis of both knees  M17.0 715.16         PLAN:  1-2.  Chronic bilateral knee pain/osteoarthritis - improved, recent exacerbation    - Nereida is following up today for her chronic bilateral knee pain which has been slowly worsening over several years.  She has also been appreciating increased swelling and instability which worsens with walking and long periods of standing at work.    - Her most recent bilateral knee CSI was 12/11/2023. She is interested in discussing other possible treatment options prior to her scheduled viscosupplementation injections in a few weeks as her pain is persistent and her knees, especially the right, feel swollen.      - We reviewed  the natural history of osteoarthritis and a multipronged treatment approach. We reviewed the importance of addressing three different aspects to best manage this condition. Controlling the intra-articular immune reaction through medications and/or injections, improving local stability through bracing and/or injection, and improving functional stability through hip, core, and ankle strength, stability and mobility which may benefit from formal physical therapy.    - After discussing options, she elects to proceed with bilateral knee aspirations with intra-articular Toradol injection.  See above for further detail    - Continue with Diclofenac 75 mg twice daily to help with pain and swelling.     - Continue with Robaxin 500 mg tabs for nighttime use as needed.  Refilled today    - Continue with bilateral medial hinged knee braces during activity.       Future planning includes - viscosupplementation injection series, physical therapy    All questions were answered to the best of my ability and all concerns were addressed at this time.    Follow up in 2-3 weeks for above, or sooner if needed.      Total time spent face-to face with patient counseling or coordinating care including prognosis, differential diagnosis, risks and benefits of treatment, instructions, compliance risk reductions as well as non-face-to-face time personally spent reviewing medial record, medical documentation, and coordination of care.     EST MINUTES X   24865 10-19    37263 20-29    66265 30-39 X   99215 40-54    NEW     89401 15-29    06499 30-44    13979 45-59    85757 60-74    PHONE      5-10    [id number] 11-20    48096 21-30

## 2024-03-05 DIAGNOSIS — G89.29 CHRONIC PAIN OF BOTH KNEES: ICD-10-CM

## 2024-03-05 DIAGNOSIS — M25.561 CHRONIC PAIN OF BOTH KNEES: ICD-10-CM

## 2024-03-05 DIAGNOSIS — M25.562 CHRONIC PAIN OF BOTH KNEES: ICD-10-CM

## 2024-03-05 RX ORDER — DICLOFENAC SODIUM 75 MG/1
75 TABLET, DELAYED RELEASE ORAL 2 TIMES DAILY
Qty: 60 TABLET | Refills: 0 | Status: SHIPPED | OUTPATIENT
Start: 2024-03-05

## 2024-03-06 DIAGNOSIS — R73.03 PREDIABETES: ICD-10-CM

## 2024-03-07 ENCOUNTER — OFFICE VISIT (OUTPATIENT)
Dept: SPORTS MEDICINE | Facility: CLINIC | Age: 49
End: 2024-03-07
Payer: COMMERCIAL

## 2024-03-07 VITALS
WEIGHT: 228 LBS | DIASTOLIC BLOOD PRESSURE: 89 MMHG | BODY MASS INDEX: 45.96 KG/M2 | SYSTOLIC BLOOD PRESSURE: 149 MMHG | HEIGHT: 59 IN

## 2024-03-07 DIAGNOSIS — M25.562 CHRONIC PAIN OF BOTH KNEES: Primary | ICD-10-CM

## 2024-03-07 DIAGNOSIS — G89.29 CHRONIC PAIN OF BOTH KNEES: Primary | ICD-10-CM

## 2024-03-07 DIAGNOSIS — M25.561 CHRONIC PAIN OF BOTH KNEES: Primary | ICD-10-CM

## 2024-03-07 DIAGNOSIS — M17.0 PRIMARY OSTEOARTHRITIS OF BOTH KNEES: ICD-10-CM

## 2024-03-07 PROCEDURE — 99499 UNLISTED E&M SERVICE: CPT | Mod: S$GLB,,, | Performed by: ORTHOPAEDIC SURGERY

## 2024-03-07 PROCEDURE — 20611 DRAIN/INJ JOINT/BURSA W/US: CPT | Mod: 50,S$GLB,, | Performed by: ORTHOPAEDIC SURGERY

## 2024-03-07 PROCEDURE — 99999 PR PBB SHADOW E&M-EST. PATIENT-LVL III: CPT | Mod: PBBFAC,,, | Performed by: ORTHOPAEDIC SURGERY

## 2024-03-07 NOTE — PROGRESS NOTES
"Subjective:     CC: bilateral knee pain/osteoarthritis    Nereida is a 48 y.o. female coming in today for their 1st Orthovisc injection to the bilateral knee. The patient reports their pain is 9/10 today.     Objective:     VITAL SIGNS: BP (!) 149/89   Ht 4' 11" (1.499 m)   Wt 103.4 kg (228 lb)   BMI 46.05 kg/m²      Orthovisc Injection Procedure #1     A time out was performed, including verification of patient ID, procedure, site and side, availability of information and equipment, review of safety issues, and agreement with consent, the procedure site was marked.    Location: Knee joint, bilateral     Procedure:   Right:  The patient was prepped aseptically with alcohol and chlorhexidine. Ethyl Chloride spray was used prior to skin puncture to help numb the superficial skin. After cold spray was applied, 2-4 cc's of 0.2% ropivacaine was injected into the skin and superficial tissue at the injection site using a 22G, 1.5" needle to form an anesthetic tunnel and ensure proper needle placement into the knee joint space.  Following the anesthetic tunnel, an 18 gauge 1.5 in needle was used to enter the joint space and 28 cc of straw-colored joint fluid was aspirated.  Using a hemostat, the syringe was exchanged with the Orthovisc syringe, and 2 cc of Orthovisc was injected into the knee joint. The patient was in the supine position during the duration of this procedure and the injection approach was from the superolateral aspect.     Left:  The patient was prepped aseptically with alcohol and chlorhexidine. Ethyl Chloride spray was used prior to skin puncture to help numb the superficial skin. After cold spray was applied, 2-4 cc's of 0.2% ropivacaine was injected into the skin and superficial tissue at the injection site using a 22G, 1.5" needle to form an anesthetic tunnel and ensure proper needle placement into the knee joint space. Following the anesthetic tunnel, an 18 gauge 1.5 in needle was used to enter the " joint space and 8 cc of straw-colored joint fluid was aspirated.  Using a hemostat, the syringe was exchanged with the Orthovisc syringe, and 2 cc of Orthovisc was injected into the knee joint. The patient was in the supine position during the duration of this procedure and the injection approach was from the superolateral aspect.     Ultrasound guidance was used for needle localization with SonoSite Edge 2, 15-6 MHz (L)probe(s). Images were saved and stored for documentation. The knee joint was well visualized. Dynamic visualization of the 22G x 1.5 needles was continuous throughout the procedure and maintained good position and correct needle placement.        Patient tolerance: The patient tolerated the procedure well with no immediate complications. There were no adverse reactions to the medication. Patient was instructed to apply ice to the joint for up to 20 minutes at a time and avoid strenuous activities for 24-36 hours following the injection. Following that time, the patient can resume activities as prior to the injection.     The patient was reminded to call the clinic immediately for any adverse side effects as explained in clinic today.     Orthovisc: x2  NDC: [address]-47  Product Code: [id number]  Lot: [id number]  Exp: 02/28/2026      Assessment:      Encounter Diagnoses   Name Primary?    Chronic pain of both knees Yes    Primary osteoarthritis of both knees           Plan:     1. Orthovisc injection of bilateral knee received today (see procedure note above)  2. Follow-up in 1 week for 2nd injection of 3 injection series  3. Patient agreeable to today's plan and all questions were answered      This note is dictated using the M*Modal Fluency Direct word recognition program. There are word recognition mistakes that are occasionally missed on review.

## 2024-03-11 DIAGNOSIS — M17.0 OSTEOARTHRITIS OF BOTH KNEES, UNSPECIFIED OSTEOARTHRITIS TYPE: ICD-10-CM

## 2024-03-12 ENCOUNTER — PATIENT MESSAGE (OUTPATIENT)
Dept: RHEUMATOLOGY | Facility: CLINIC | Age: 49
End: 2024-03-12
Payer: COMMERCIAL

## 2024-03-12 RX ORDER — GABAPENTIN 100 MG/1
100 CAPSULE ORAL 3 TIMES DAILY
Qty: 90 CAPSULE | Refills: 6 | OUTPATIENT
Start: 2024-03-12 | End: 2024-10-08

## 2024-03-14 ENCOUNTER — TELEPHONE (OUTPATIENT)
Dept: BARIATRICS | Facility: CLINIC | Age: 49
End: 2024-03-14
Payer: COMMERCIAL

## 2024-03-15 ENCOUNTER — PATIENT MESSAGE (OUTPATIENT)
Dept: SPORTS MEDICINE | Facility: CLINIC | Age: 49
End: 2024-03-15

## 2024-03-15 ENCOUNTER — OFFICE VISIT (OUTPATIENT)
Dept: SPORTS MEDICINE | Facility: CLINIC | Age: 49
End: 2024-03-15
Payer: COMMERCIAL

## 2024-03-15 VITALS
BODY MASS INDEX: 46.47 KG/M2 | WEIGHT: 230.5 LBS | SYSTOLIC BLOOD PRESSURE: 155 MMHG | HEIGHT: 59 IN | DIASTOLIC BLOOD PRESSURE: 74 MMHG | HEART RATE: 86 BPM

## 2024-03-15 DIAGNOSIS — M25.561 CHRONIC PAIN OF BOTH KNEES: Primary | ICD-10-CM

## 2024-03-15 DIAGNOSIS — M17.0 PRIMARY OSTEOARTHRITIS OF BOTH KNEES: ICD-10-CM

## 2024-03-15 DIAGNOSIS — G89.29 CHRONIC PAIN OF BOTH KNEES: Primary | ICD-10-CM

## 2024-03-15 DIAGNOSIS — M25.562 CHRONIC PAIN OF BOTH KNEES: Primary | ICD-10-CM

## 2024-03-15 PROCEDURE — 20611 DRAIN/INJ JOINT/BURSA W/US: CPT | Mod: 50,S$GLB,, | Performed by: ORTHOPAEDIC SURGERY

## 2024-03-15 PROCEDURE — 99499 UNLISTED E&M SERVICE: CPT | Mod: S$GLB,,, | Performed by: ORTHOPAEDIC SURGERY

## 2024-03-15 PROCEDURE — 99999 PR PBB SHADOW E&M-EST. PATIENT-LVL III: CPT | Mod: PBBFAC,,, | Performed by: ORTHOPAEDIC SURGERY

## 2024-03-15 RX ORDER — IBUPROFEN 800 MG/1
800 TABLET ORAL 2 TIMES DAILY PRN
Qty: 90 TABLET | Refills: 0 | Status: SHIPPED | OUTPATIENT
Start: 2024-03-15 | End: 2024-05-31 | Stop reason: SDUPTHER

## 2024-03-15 NOTE — PROGRESS NOTES
"Subjective:     CC: bilateral knee pain/osteoarthritis    Nereida is a 48 y.o. female coming in today for their 2nd Orthovisc injection to the bilateral knee. The patient reports their pain is 8/10 today.     Objective:     VITAL SIGNS: BP (!) 155/74   Pulse 86   Ht 4' 11" (1.499 m)   Wt 104.5 kg (230 lb 7.9 oz)   BMI 46.55 kg/m²      Orthovisc Injection Procedure #2     A time out was performed, including verification of patient ID, procedure, site and side, availability of information and equipment, review of safety issues, and agreement with consent, the procedure site was marked.    Location: Knee joint, bilateral     Procedure RIGHT: The patient was prepped aseptically with alcohol and chlorhexidine. Ethyl Chloride spray was used prior to skin puncture to help numb the superficial skin. After cold spray was applied, 2-4 cc's of 0.2% ropivacaine was injected into the skin and superficial tissue at the injection site using a 25G, 1.5" needle to form an anesthetic tunnel and ensure proper needle placement into the knee joint space. Cold spray was applied again and an 18G x 1.5" needle was used to enter the joint space and 30 cc of clear synovial fluid was aspirated. Using a hemostat, the syringe was exchanged with the Orthovisc syringe, and 2 cc of Orthovisc was injected into the knee joint. The patient was in the supine position during the duration of this procedure and the injection approach was from the superolateral aspect.     Procedure LEFT: The patient was prepped aseptically with alcohol and chlorhexidine. Ethyl Chloride spray was used prior to skin puncture to help numb the superficial skin. After cold spray was applied, a 22G, 2.5" needle was used to enter the joint space and 2 cc of Orthovisc was injected into the knee joint. The patient was in the supine position during the duration of this procedure and the injection approach was from the superolateral aspect.     Ultrasound guidance was used for " "needle localization with SonoSite Edge 2, 15-6 MHz (L)probe(s). Images were saved and stored for documentation. The knee joint was well visualized. Dynamic visualization of the 25G x 1.5," 18G x 1.5" and 22G x 1.5 needles was continuous throughout the procedure and maintained good position and correct needle placement.        Patient tolerance: The patient tolerated the procedure well with no immediate complications. There were no adverse reactions to the medication. Patient was instructed to apply ice to the joint for up to 20 minutes at a time and avoid strenuous activities for 24-36 hours following the injection. Following that time, the patient can resume activities as prior to the injection.     The patient was reminded to call the clinic immediately for any adverse side effects as explained in clinic today.     Orthovisc: X2:  NDC: [address]-20  Product Code: [id number]  Lot: [id number]  Exp: 2026-02-28      Assessment:      Encounter Diagnoses   Name Primary?    Chronic pain of both knees Yes    Primary osteoarthritis of both knees           Plan:     1. Orthovisc injection of bilateral knee received today (see procedure note above)  2. Follow-up in 1 week for 3rd injection of 3 injection series  3. Patient agreeable to today's plan and all questions were answered      This note is dictated using the M*Modal Fluency Direct word recognition program. There are word recognition mistakes that are occasionally missed on review.              "

## 2024-03-22 ENCOUNTER — OFFICE VISIT (OUTPATIENT)
Dept: SPORTS MEDICINE | Facility: CLINIC | Age: 49
End: 2024-03-22
Payer: COMMERCIAL

## 2024-03-22 VITALS
SYSTOLIC BLOOD PRESSURE: 153 MMHG | DIASTOLIC BLOOD PRESSURE: 82 MMHG | WEIGHT: 230.5 LBS | HEIGHT: 59 IN | HEART RATE: 86 BPM | BODY MASS INDEX: 46.47 KG/M2

## 2024-03-22 DIAGNOSIS — M25.561 CHRONIC PAIN OF BOTH KNEES: Primary | ICD-10-CM

## 2024-03-22 DIAGNOSIS — G89.29 CHRONIC PAIN OF BOTH KNEES: Primary | ICD-10-CM

## 2024-03-22 DIAGNOSIS — M17.0 PRIMARY OSTEOARTHRITIS OF BOTH KNEES: ICD-10-CM

## 2024-03-22 DIAGNOSIS — M25.562 CHRONIC PAIN OF BOTH KNEES: Primary | ICD-10-CM

## 2024-03-22 PROCEDURE — 99999 PR PBB SHADOW E&M-EST. PATIENT-LVL III: CPT | Mod: PBBFAC,,, | Performed by: ORTHOPAEDIC SURGERY

## 2024-03-22 PROCEDURE — 20611 DRAIN/INJ JOINT/BURSA W/US: CPT | Mod: 50,S$GLB,, | Performed by: ORTHOPAEDIC SURGERY

## 2024-03-22 PROCEDURE — 99499 UNLISTED E&M SERVICE: CPT | Mod: S$GLB,,, | Performed by: ORTHOPAEDIC SURGERY

## 2024-03-22 NOTE — PROGRESS NOTES
"Subjective:     CC: bilateral knee pain/osteoarthritis    Nereida is a 48 y.o. female coming in today for their 3rd Orthovisc injection to the bilateral knee. The patient reports their pain is 7/10 today.     Objective:     VITAL SIGNS: BP (!) 153/82   Pulse 86   Ht 4' 11" (1.499 m)   Wt 104.5 kg (230 lb 7.9 oz)   BMI 46.55 kg/m²      Orthovisc Injection Procedure #3     A time out was performed, including verification of patient ID, procedure, site and side, availability of information and equipment, review of safety issues, and agreement with consent, the procedure site was marked.    Location: Knee joint, bilateral     Procedure RIGHT: The patient was prepped aseptically with alcohol and chlorhexidine. Ethyl Chloride spray was used prior to skin puncture to help numb the superficial skin. After cold spray was applied, 2-4 cc's of 0.2% ropivacaine was injected into the skin and superficial tissue at the injection site using a 25G, 1.5" needle to form an anesthetic tunnel and ensure proper needle placement into the knee joint space. Cold spray was applied again and an 18G x 1.5" needle was used to enter the joint space and 30 cc of clear synovial fluid was aspirated. Using a hemostat, the syringe was exchanged with the Orthovisc syringe, and 2 cc of Orthovisc was injected into the knee joint. The patient was in the supine position during the duration of this procedure and the injection approach was from the superolateral aspect.     Procedure LEFT: The patient was prepped aseptically with alcohol and chlorhexidine. Ethyl Chloride spray was used prior to skin puncture to help numb the superficial skin. After cold spray was applied, 2-4 cc's of 0.2% ropivacaine was injected into the skin and superficial tissue at the injection site using a 25G, 1.5" needle to form an anesthetic tunnel and ensure proper needle placement into the knee joint space. Cold spray was applied again, and an 18G x 1.5" needle was used to " "enter the joint space and 5 cc of clear synovial fluid was aspirated. Using a hemostat, the syringe was exchanged with the Orthovisc syringe, and 2 cc of Orthovisc was injected into the knee joint. The patient was in the supine position during the duration of this procedure and the injection approach was from the superolateral aspect.     Ultrasound guidance was used for needle localization with SonoSite Edge 2, 15-6 MHz (L)probe(s). Images were saved and stored for documentation. The knee joint was well visualized. Dynamic visualization of the 25G x 1.5 and 18G x 1.5" needles was continuous throughout the procedure and maintained good position and correct needle placement.        Patient tolerance: The patient tolerated the procedure well with no immediate complications. There were no adverse reactions to the medication. Patient was instructed to apply ice to the joint for up to 20 minutes at a time and avoid strenuous activities for 24-36 hours following the injection. Following that time, the patient can resume activities as prior to the injection.     The patient was reminded to call the clinic immediately for any adverse side effects as explained in clinic today.     Orthovisc: X2:  NDC: [address]-42  Product Code: [id number]  Lot: [id number]  Exp: 2026-02-16      Assessment:      Encounter Diagnoses   Name Primary?    Chronic pain of both knees Yes    Primary osteoarthritis of both knees           Plan:     1. Orthovisc injection of bilateral knee received today (see procedure note above)  2. Follow-up as needed  3. Patient agreeable to today's plan and all questions were answered      This note is dictated using the M*Modal Fluency Direct word recognition program. There are word recognition mistakes that are occasionally missed on review.              "

## 2024-03-29 ENCOUNTER — PATIENT MESSAGE (OUTPATIENT)
Dept: SPORTS MEDICINE | Facility: CLINIC | Age: 49
End: 2024-03-29
Payer: COMMERCIAL

## 2024-04-02 ENCOUNTER — OFFICE VISIT (OUTPATIENT)
Dept: SPORTS MEDICINE | Facility: CLINIC | Age: 49
End: 2024-04-02
Payer: COMMERCIAL

## 2024-04-02 VITALS
HEART RATE: 78 BPM | BODY MASS INDEX: 45.16 KG/M2 | WEIGHT: 224 LBS | DIASTOLIC BLOOD PRESSURE: 80 MMHG | SYSTOLIC BLOOD PRESSURE: 149 MMHG | HEIGHT: 59 IN

## 2024-04-02 DIAGNOSIS — M25.561 CHRONIC PAIN OF BOTH KNEES: ICD-10-CM

## 2024-04-02 DIAGNOSIS — M25.462 BILATERAL KNEE EFFUSIONS: ICD-10-CM

## 2024-04-02 DIAGNOSIS — M25.562 CHRONIC PAIN OF BOTH KNEES: ICD-10-CM

## 2024-04-02 DIAGNOSIS — M25.461 BILATERAL KNEE EFFUSIONS: ICD-10-CM

## 2024-04-02 DIAGNOSIS — M17.0 PRIMARY OSTEOARTHRITIS OF BOTH KNEES: Primary | ICD-10-CM

## 2024-04-02 DIAGNOSIS — M25.561 ACUTE PAIN OF BOTH KNEES: Primary | ICD-10-CM

## 2024-04-02 DIAGNOSIS — G89.29 CHRONIC PAIN OF BOTH KNEES: ICD-10-CM

## 2024-04-02 DIAGNOSIS — M25.562 ACUTE PAIN OF BOTH KNEES: Primary | ICD-10-CM

## 2024-04-02 PROCEDURE — 99999 PR PBB SHADOW E&M-EST. PATIENT-LVL III: CPT | Mod: PBBFAC,,, | Performed by: PHYSICIAN ASSISTANT

## 2024-04-02 PROCEDURE — 99213 OFFICE O/P EST LOW 20 MIN: CPT | Mod: 25,S$GLB,, | Performed by: PHYSICIAN ASSISTANT

## 2024-04-02 PROCEDURE — 20611 DRAIN/INJ JOINT/BURSA W/US: CPT | Mod: 50,S$GLB,, | Performed by: PHYSICIAN ASSISTANT

## 2024-04-02 NOTE — PROGRESS NOTES
NEW PATIENT    HISTORY OF PRESENT ILLNESS   48 y.o. Female with a history of bilateral knee pain who has been seeing Dr. Tang Allison for management of her knee osteoarthritis.  She has been receiving Orthovisc injections now for several years.  Her most recent Orthovisc series was completed on 2024.  She complains today of having increased swelling and is asking to have her knee strained.  She states that she has her knees drained weekly by Dr. Allison.  She relates her pain and swelling to [place]      + mechanical symptoms, + instability    Is affecting ADLs.  Pain is 10/10 at it's worst.        PAST MEDICAL HISTORY    Past Medical History:   Diagnosis Date    Allergy     Morbid obesity with BMI of 45.0-49.9, adult        PAST SURGICAL HISTORY     Past Surgical History:   Procedure Laterality Date    ARTHROSCOPIC CHONDROPLASTY OF KNEE JOINT Right 2020    Procedure: ARTHROSCOPY, KNEE, WITH CHONDROPLASTY;  Surgeon: Aiden Clarke DO;  Location: [facility] OR;  Service: Orthopedics;  Laterality: Right;     SECTION      KNEE ARTHROSCOPY W/ MENISCECTOMY Right 2020    Procedure: ARTHROSCOPY, KNEE, WITH MENISCECTOMY;  Surgeon: Aiden Clarke DO;  Location: [facility] OR;  Service: Orthopedics;  Laterality: Right;  Equipment: Scope; [employer] Truspar Meniscus Repair System; Maria Eugenia chondral drill set  Vendor: [employer]; [place]  Table: Supine    KNEE ARTHROSCOPY W/ PLICA EXCISION Right 2020    Procedure: EXCISION, PLICA, KNEE, ARTHROSCOPIC;  Surgeon: Aiden Clarke DO;  Location: [facility] OR;  Service: Orthopedics;  Laterality: Right;       FAMILY HISTORY    Family History   Problem Relation Age of Onset    Hypertension Mother     Alcohol abuse Father         none    Breast cancer Neg Hx     Ovarian cancer Neg Hx        SOCIAL HISTORY    Social History     Socioeconomic History    Marital status: Single   Tobacco Use    Smoking status: Never    Smokeless tobacco: Never    Substance and Sexual Activity    Alcohol use: No    Drug use: No    Sexual activity: Yes     Partners: Male     Birth control/protection: None     Social Determinants of Health     Financial Resource Strain: Low Risk  (12/11/2023)    Overall Financial Resource Strain (CARDIA)     Difficulty of Paying Living Expenses: Not hard at all   Food Insecurity: No Food Insecurity (12/11/2023)    Hunger Vital Sign     Worried About Running Out of Food in the Last Year: Never true     Ran Out of Food in the Last Year: Never true   Transportation Needs: No Transportation Needs (12/11/2023)    PRAPARE - Transportation     Lack of Transportation (Medical): No     Lack of Transportation (Non-Medical): No   Physical Activity: Sufficiently Active (12/11/2023)    Exercise Vital Sign     Days of Exercise per Week: 5 days     Minutes of Exercise per Session: 30 min   Stress: No Stress Concern Present (12/11/2023)    [employer] - Occupational Stress Questionnaire     Feeling of Stress : Not at all   Social Connections: Unknown (12/11/2023)    Social Connection and Isolation Panel [NHANES]     Frequency of Communication with Friends and Family: Twice a week     Frequency of Social Gatherings with Friends and Family: Twice a week     Active Member of Clubs or Organizations: No     Attends Club or Organization Meetings: 1 to 4 times per year     Marital Status: Never    Housing Stability: Unknown (12/11/2023)    Housing Stability Vital Sign     Unable to Pay for Housing in the Last Year: No     Unstable Housing in the Last Year: No       MEDICATIONS      Current Outpatient Medications:     diclofenac (VOLTAREN) 75 MG EC tablet, Take 1 tablet (75 mg total) by mouth 2 (two) times daily., Disp: 60 tablet, Rfl: 0    diclofenac sodium (VOLTAREN) 1 % Gel, Apply 2 g topically 4 (four) times daily., Disp: 20 g, Rfl: 2    furosemide (LASIX) 40 MG tablet, Take 1 tablet (40 mg total) by mouth once daily., Disp:  "30 tablet, Rfl: 3    gabapentin (NEURONTIN) 100 MG capsule, Take 1 capsule (100 mg total) by mouth 3 (three) times daily., Disp: 90 capsule, Rfl: 6    ibuprofen (ADVIL,MOTRIN) 800 MG tablet, Take 1 tablet (800 mg total) by mouth 2 (two) times daily as needed for Pain., Disp: 90 tablet, Rfl: 0    methocarbamoL (ROBAXIN) 500 MG Tab, Take 1 tablet (500 mg total) by mouth 3 (three) times daily as needed (pain)., Disp: 45 tablet, Rfl: 0    ALLERGIES     Review of patient's allergies indicates:   Allergen Reactions    Adhesive      Causes skin tearing and redness          REVIEW OF SYSTEMS   Constitution: Negative. Negative for chills, fever and night sweats.   HENT: Negative for congestion and headaches.    Eyes: Negative for blurred vision, left vision loss and right vision loss.   Cardiovascular: Negative for chest pain and syncope.   Respiratory: Negative for cough and shortness of breath.    Endocrine: Negative for polydipsia, polyphagia and polyuria.   Hematologic/Lymphatic: Negative for bleeding problem. Does not bruise/bleed easily.   Skin: Negative for dry skin, itching and rash.   Musculoskeletal: Negative for falls. Positive for bilateral knee pain and swelling.  Gastrointestinal: Negative for abdominal pain and bowel incontinence.   Genitourinary: Negative for bladder incontinence and nocturia.   Neurological: Negative for disturbances in coordination, loss of balance and seizures.   Psychiatric/Behavioral: Negative for depression. The patient does not have insomnia.    Allergic/Immunologic: Negative for hives and persistent infections.     PHYSICAL EXAMINATION    Vitals: BP (!) 149/80   Pulse 78   Ht 4' 11" (1.499 m)   Wt 101.6 kg (224 lb)   BMI 45.24 kg/m²     General: The patient appears active and healthy with no apparent physical problems.  No disturbance of mood or affect is demonstrated. Alert and Oriented.    HEENT: Eyes normal, pupils equally round, nose normal.    Resp: Equal and symmetrical " chest rises. No wheezing    CV: Regular rate    Neck: Supple; nonpainful range of motion.    Extremities: no cyanosis, clubbing, edema, or diffuse swelling.  Palpable pulses, good capillary refill of the digits.  No coolness, discoloration, edema or obvious varicosities.    Skin: no lesions noted.    Lymphatic: no detected adenopathy in the upper or lower extremities.    Neurologic: normal mental status, normal reflexes, normal gait and balance.  Patient is alert and oriented to person, place and time.  No flaccidity or spasticity is noted.  No motor or sensory deficits are noted.  Light touch is intact    Orthopaedic: KNEE EXAM - RIGHT    Inspection:   Normal skin color and appearance with no scars, no ecchymosis + effusion.      ROM:   0° - 115°.      Palpation:   There is no tenderness along medial plica, medial collateral ligament, pes bursa, lateral joint line, iliotibial band, lateral collateral ligament, popliteal fossa, patellar tendon, or quadriceps tendon.  There is tenderness along the medial joint line.      Stability: - anterior drawer, - Lachman, - pivot shift and - posterior drawer.      No instability with varus stress at 0° or 30°.  There is valgus instability due to the varus deformity. Negative dial  test at 30° and 90°.    Tests:   - Shashas test.  - patellar compression - grind test, + crepitus.  There is no patellar apprehension.     - J Sign. - Venr's. - patellar tilt. - Sunny. Lateral patella translation 1 quadrant. Medial patella translation 1 quadrant    Motor:   Quadriceps strength is 5/5 and hamstrings strength is 5/5. Hamstrings show no tightness.      Neuro:   Distal neurovascular status is normal    Vascular: Negative Homans and no palpable popliteal cords. 2+ pedal pulse with brisk cap refill    KNEE EXAM - LEFT    Inspection:   Normal skin color and appearance with no scars, no ecchymosis + effusion.      ROM:   0° - 115°.      Palpation:   There is no tenderness along medial  plica, medial collateral ligament, pes bursa, lateral joint line, iliotibial band, lateral collateral ligament, popliteal fossa, patellar tendon, or quadriceps tendon.  There is tenderness along the medial joint line.      Stability: - anterior drawer, - Lachman, - pivot shift and - posterior drawer.      No instability with varus stress at 0° or 30°.  There is valgus instability due to the varus deformity. Negative dial  test at 30° and 90°.    Tests:   - Shashas test.  - patellar compression - grind test, + crepitus.  There is no patellar apprehension.     - J Sign. - Vern's. - patellar tilt. - Sunny. Lateral patella translation 1 quadrant. Medial patella translation 1 quadrant    Motor:   Quadriceps strength is 5/5 and hamstrings strength is 5/5. Hamstrings show no tightness.      Neuro:   Distal neurovascular status is normal    Vascular: Negative Homans and no palpable popliteal cords. 2+ pedal pulse with brisk cap refill    Gait antalgic      IMAGING    X-ray Knee Ortho Bilateral with Flexion  Order: [id number]  Status: Final result       Visible to patient: Yes (seen)       Next appt: 04/09/2024 at 03:30 PM in Bariatrics (Albina Lawson MD)       Dx: Chronic pain of both knees    0 Result Notes  Details    Reading Physician Reading Date Result Priority   Leonela Roy MD  [phone] 12/14/2022 Routine     Narrative & Impression  EXAMINATION:  XR KNEE ORTHO BILAT WITH FLEXION     CLINICAL HISTORY:  Pain in right knee     TECHNIQUE:  AP standing of both knees, PA flexion standing views of both knees, and Merchant views of both knees were performed.  Lateral views of both knees were also performed.     COMPARISON:  08/04/2022     FINDINGS:  Bilaterally there is severe narrowing of the medial compartments of the knees more so on the AP view, with lateral compartment mildly widened and distal femur slightly medially positioned relative to the tibias.  Findings slightly more so on the right than on  the left.  Tricompartmental degenerative change also appearing slightly greater on the right.  No acute fracture or dislocation.  Medial compartment of the right knee is narrowed today than on the prior exam although prior images were not labeled erect and weight-bearing.     Impression:     As above.  Tricompartmental degenerative change and severe narrowing medial compartments of the knees.        Electronically signed by: Leonela Roy MD  Date:                                            12/14/2022  Time:                                           14:23           Exam Ended: 12/14/22 11:06 CST Last Resulted: 12/14/22 14:23 CST             IMPRESSION       ICD-10-CM ICD-9-CM   1. Primary osteoarthritis of both knees  M17.0 715.16   2. Chronic pain of both knees  M25.561 719.46    M25.562 338.29    G89.29    3. Bilateral knee effusions  M25.461 719.06    M25.462        MEDICATIONS PRESCRIBED      None    RECOMMENDATIONS     Recommended and performed an aspiration of each knee done under ultrasound guidance today  Encouraged frequent icing and elevation as needed for pain and swelling  Return to clinic with Dr. Allison as scheduled      Sports Medicine US - Guidance for Needle Placement    Date/Time: 4/2/2024 2:00 PM    Performed by: Imer Hanson PA-C  Authorized by: Imer Hanson PA-C  Preparation: Patient was prepped and draped in the usual sterile fashion.  Local anesthesia used: yes    Anesthesia:  Local anesthesia used: yes  Local Anesthetic: co-phenylcaine spray    Sedation:  Patient sedated: no    Patient tolerance: patient tolerated the procedure well with no immediate complications      Large Joint Aspiration/Injection: bilateral knee    Date/Time: 4/2/2024 2:00 PM    Performed by: Imer Hanson PA-C  Authorized by: Imer Hanson PA-C    Consent Done?:  Yes (Verbal)  Indications:  Joint swelling and pain  Site marked: the procedure site was marked    Timeout: prior to procedure the correct  patient, procedure, and site was verified    Prep: patient was prepped and draped in usual sterile fashion      Local anesthesia used?: Yes    Local anesthetic:  Co-phenylcaine spray    Details:  Needle Size:  22 G  Ultrasonic Guidance for needle placement?: Yes (Ultrasound guidance was used for needle localization.  Images were saved and stored for documentation.  Dynamic visualization of the needle was continuous throughout the procedure and maintained accurate placement)    Images are saved and documented.  Approach: superolateral.  Location:  Knee (bilateral knees)  Laterality:  Bilateral  Site:  Bilateral knee  Aspirate (Right) amount (mL):  30  Aspirate (Right):  Clear and yellow  Aspirate (Left) amount (mL):  20  Aspirate (Left):  Clear and yellow  Patient tolerance:  Patient tolerated the procedure well with no immediate complications          All questions were answered, pt will contact us for questions or concerns in the interim.

## 2024-04-10 DIAGNOSIS — G89.29 CHRONIC PAIN OF BOTH KNEES: ICD-10-CM

## 2024-04-10 DIAGNOSIS — M25.561 CHRONIC PAIN OF BOTH KNEES: ICD-10-CM

## 2024-04-10 DIAGNOSIS — M25.562 CHRONIC PAIN OF BOTH KNEES: ICD-10-CM

## 2024-04-10 RX ORDER — METHOCARBAMOL 500 MG/1
500 TABLET, FILM COATED ORAL 3 TIMES DAILY PRN
Qty: 45 TABLET | Refills: 0 | Status: SHIPPED | OUTPATIENT
Start: 2024-04-10 | End: 2024-04-24 | Stop reason: SDUPTHER

## 2024-04-15 ENCOUNTER — TELEPHONE (OUTPATIENT)
Dept: SPORTS MEDICINE | Facility: CLINIC | Age: 49
End: 2024-04-15
Payer: COMMERCIAL

## 2024-04-15 NOTE — TELEPHONE ENCOUNTER
FW: Appointment Request   Elias Santana   Sent: Mon April 15, 2024  1:01 PM   To: [facility] Staff      Nereida Narayanan   MRN: [id number] : 1975   Pt Home: [phone]     Entered: [phone]        Message       ----- Message -----   From: OracioNereida   Sent: 4/15/2024  12:53 PM CDT   To: [facility] Clinical Staff   Subject: Appointment Request                                Appointment Request From: Nereida Narayanan      With Provider: Imer Hanson [[facility] - Sports [facility]]      Preferred Date Range: 2024 - 2024      Preferred Times: Any Time      Reason for visit: Fluid on knees      Comments:   Fluid on knees      Left a message on patient phone to let her know that I got her message and sorry that I missed  your call, please back at [phone] or you can sent me a message.    Nikia

## 2024-04-21 ENCOUNTER — HOSPITAL ENCOUNTER (EMERGENCY)
Facility: HOSPITAL | Age: 49
Discharge: HOME | End: 2024-04-21
Attending: STUDENT IN AN ORGANIZED HEALTH CARE EDUCATION/TRAINING PROGRAM
Payer: COMMERCIAL

## 2024-04-21 VITALS
WEIGHT: 230 LBS | RESPIRATION RATE: 16 BRPM | BODY MASS INDEX: 46.37 KG/M2 | DIASTOLIC BLOOD PRESSURE: 88 MMHG | HEART RATE: 71 BPM | OXYGEN SATURATION: 99 % | SYSTOLIC BLOOD PRESSURE: 142 MMHG | HEIGHT: 59 IN | TEMPERATURE: 98 F

## 2024-04-21 DIAGNOSIS — M25.462 BILATERAL KNEE SWELLING: ICD-10-CM

## 2024-04-21 DIAGNOSIS — M17.0 OSTEOARTHRITIS OF BOTH KNEES, UNSPECIFIED OSTEOARTHRITIS TYPE: ICD-10-CM

## 2024-04-21 DIAGNOSIS — M25.561 KNEE PAIN, BILATERAL: Primary | ICD-10-CM

## 2024-04-21 DIAGNOSIS — M25.461 BILATERAL KNEE SWELLING: ICD-10-CM

## 2024-04-21 DIAGNOSIS — M25.562 KNEE PAIN, BILATERAL: Primary | ICD-10-CM

## 2024-04-21 LAB
ANION GAP SERPL CALC-SCNC: 10 MMOL/L (ref 8–16)
BASOPHILS # BLD AUTO: 0.1 K/UL (ref 0–0.2)
BASOPHILS NFR BLD: 0.9 % (ref 0–1.9)
BUN SERPL-MCNC: 13 MG/DL (ref 6–20)
CALCIUM SERPL-MCNC: 9.6 MG/DL (ref 8.7–10.5)
CHLORIDE SERPL-SCNC: 106 MMOL/L (ref 95–110)
CO2 SERPL-SCNC: 22 MMOL/L (ref 23–29)
CREAT SERPL-MCNC: 0.6 MG/DL (ref 0.5–1.4)
CRP SERPL-MCNC: 14.9 MG/L (ref 0–8.2)
DIFFERENTIAL METHOD BLD: ABNORMAL
EOSINOPHIL # BLD AUTO: 0.4 K/UL (ref 0–0.5)
EOSINOPHIL NFR BLD: 3.4 % (ref 0–8)
ERYTHROCYTE [DISTWIDTH] IN BLOOD BY AUTOMATED COUNT: 14.5 % (ref 11.5–14.5)
ERYTHROCYTE [SEDIMENTATION RATE] IN BLOOD BY PHOTOMETRIC METHOD: 47 MM/HR (ref 0–36)
EST. GFR  (NO RACE VARIABLE): >60 ML/MIN/1.73 M^2
GLUCOSE SERPL-MCNC: 86 MG/DL (ref 70–110)
HCT VFR BLD AUTO: 36.7 % (ref 37–48.5)
HCV AB SERPL QL IA: NORMAL
HGB BLD-MCNC: 11.7 G/DL (ref 12–16)
HIV 1+2 AB+HIV1 P24 AG SERPL QL IA: NORMAL
IMM GRANULOCYTES # BLD AUTO: 0.06 K/UL (ref 0–0.04)
IMM GRANULOCYTES NFR BLD AUTO: 0.5 % (ref 0–0.5)
LYMPHOCYTES # BLD AUTO: 3.2 K/UL (ref 1–4.8)
LYMPHOCYTES NFR BLD: 27.7 % (ref 18–48)
MCH RBC QN AUTO: 27.9 PG (ref 27–31)
MCHC RBC AUTO-ENTMCNC: 31.9 G/DL (ref 32–36)
MCV RBC AUTO: 88 FL (ref 82–98)
MONOCYTES # BLD AUTO: 0.8 K/UL (ref 0.3–1)
MONOCYTES NFR BLD: 6.8 % (ref 4–15)
NEUTROPHILS # BLD AUTO: 6.9 K/UL (ref 1.8–7.7)
NEUTROPHILS NFR BLD: 60.7 % (ref 38–73)
NRBC BLD-RTO: 0 /100 WBC
PLATELET # BLD AUTO: 339 K/UL (ref 150–450)
PMV BLD AUTO: 10.7 FL (ref 9.2–12.9)
POTASSIUM SERPL-SCNC: 3.9 MMOL/L (ref 3.5–5.1)
RBC # BLD AUTO: 4.19 M/UL (ref 4–5.4)
SODIUM SERPL-SCNC: 138 MMOL/L (ref 136–145)
WBC # BLD AUTO: 11.37 K/UL (ref 3.9–12.7)

## 2024-04-21 PROCEDURE — 87389 HIV-1 AG W/HIV-1&-2 AB AG IA: CPT | Performed by: PHYSICIAN ASSISTANT

## 2024-04-21 PROCEDURE — 86803 HEPATITIS C AB TEST: CPT | Performed by: PHYSICIAN ASSISTANT

## 2024-04-21 PROCEDURE — 86140 C-REACTIVE PROTEIN: CPT | Performed by: PHYSICIAN ASSISTANT

## 2024-04-21 PROCEDURE — 99284 EMERGENCY DEPT VISIT MOD MDM: CPT | Mod: 25

## 2024-04-21 PROCEDURE — 85652 RBC SED RATE AUTOMATED: CPT | Performed by: PHYSICIAN ASSISTANT

## 2024-04-21 PROCEDURE — 85025 COMPLETE CBC W/AUTO DIFF WBC: CPT | Performed by: PHYSICIAN ASSISTANT

## 2024-04-21 PROCEDURE — 80048 BASIC METABOLIC PNL TOTAL CA: CPT | Performed by: PHYSICIAN ASSISTANT

## 2024-04-21 RX ORDER — HYDROCODONE BITARTRATE AND ACETAMINOPHEN 5; 325 MG/1; MG/1
1 TABLET ORAL EVERY 4 HOURS PRN
Qty: 15 TABLET | Refills: 0 | Status: SHIPPED | OUTPATIENT
Start: 2024-04-21 | End: 2024-04-24

## 2024-04-21 NOTE — ED PROVIDER NOTES
Encounter Date: 4/21/2024       History     Chief Complaint   Patient presents with    Knee Pain     R reports difficulty bending it. Denies trauma. Had gel injections in it 1 month ago and states worsening pain     The patient is a 48 year old female.  She has a documented past medical history of osteoarthritis and chronic bilateral knee pain. She is followed closely by ortho/sports medicine for this. She has been getting Orthovisc injections into both knees now for several years. Her last injections and completion of her current series was on 3/22/24. She states that she feels that the injections have not been alleviating her knee pain. She states that she has been going back to clinic 1-2 times per month for therapeutic arthrocentesis and that having her knees tapped is the only thing that provides her any degree of knee pain relief. She went back to clinic on 4/2 due to the pain and had her knees tapped/drained again. She states that she felt some relief for about a week, but that [place] [employer] [place], her knees are swollen and painful again. She is taking Gabapentin and Ibuprofen for pain with inadequate relief. She states that she feels she needs to have joint replacement surgery. She is here requesting to have knee fluid drained again and something stronger for pain.     She denies any additional symptoms or concerns. She denies any fevers or chills. She denies any redness or skin color change. She denies any calf pain or swelling. She states that she is able to bend her knee, but ROM is painful.       Review of patient's allergies indicates:   Allergen Reactions    Adhesive      Causes skin tearing and redness      Past Medical History:   Diagnosis Date    Allergy     Bilateral chronic knee pain     Morbid obesity with BMI of 45.0-49.9, adult     Osteoarthritis of both knees      Past Surgical History:   Procedure Laterality Date    ARTHROSCOPIC CHONDROPLASTY OF KNEE  JOINT Right 2020    Procedure: ARTHROSCOPY, KNEE, WITH CHONDROPLASTY;  Surgeon: Aiden Clarke DO;  Location: [facility] OR;  Service: Orthopedics;  Laterality: Right;     SECTION      KNEE ARTHROSCOPY W/ MENISCECTOMY Right 2020    Procedure: ARTHROSCOPY, KNEE, WITH MENISCECTOMY;  Surgeon: Aiden Clarke DO;  Location: [facility] OR;  Service: Orthopedics;  Laterality: Right;  Equipment: Scope; [employer] Truspar Meniscus Repair System; Cavalier chondral drill set  Vendor: [employer]; Cavalier  Table: Supine    KNEE ARTHROSCOPY W/ PLICA EXCISION Right 2020    Procedure: EXCISION, PLICA, KNEE, ARTHROSCOPIC;  Surgeon: Aiden Clarke DO;  Location: [facility] OR;  Service: Orthopedics;  Laterality: Right;     Family History   Problem Relation Name Age of Onset    Hypertension Mother      Alcohol abuse Father Yaya         none    Breast cancer Neg Hx      Ovarian cancer Neg Hx       Social History     Tobacco Use    Smoking status: Never    Smokeless tobacco: Never   Substance Use Topics    Alcohol use: No    Drug use: No     Review of Systems   Constitutional:  Negative for chills and fever.   Respiratory:  Negative for shortness of breath.    Cardiovascular:  Negative for chest pain and leg swelling.   Gastrointestinal:  Negative for diarrhea, nausea and vomiting.       Physical Exam     Initial Vitals [24 1435]   BP Pulse Resp Temp SpO2   (!) 209/93 86 16 97.7 °F (36.5 °C) 98 %      MAP       --         Physical Exam    Nursing note and vitals reviewed.  Constitutional: She appears well-developed and well-nourished. She is not diaphoretic.   Alert and ambulatory.    HENT:   Head: Normocephalic.   Eyes: Conjunctivae are normal.   Cardiovascular:  Normal rate and intact distal pulses.           Pulmonary/Chest: No respiratory distress.   Abdominal: She exhibits no distension. There is no abdominal tenderness.   Musculoskeletal:      Comments: Valgus deformity, chronic; bilateral knee pain reported with  active/passive ROM; mild suprapatellar effusion noted bilaterally, R>L, no erythema or increased warmth; FROM observed. No calf pain or swelling. No pretibial edema.      Neurological: She is alert and oriented to person, place, and time. She has normal strength. No sensory deficit.   Skin: Skin is warm and dry. No rash and no abscess noted. No erythema.   Psychiatric: She has a normal mood and affect. Her behavior is normal.         ED Course   Procedures  Labs Reviewed   CBC W/ AUTO DIFFERENTIAL - Abnormal; Notable for the following components:       Result Value    Hemoglobin 11.7 (*)     Hematocrit 36.7 (*)     MCHC 31.9 (*)     Immature Grans (Abs) 0.06 (*)     All other components within normal limits   BASIC METABOLIC PANEL - Abnormal; Notable for the following components:    CO2 22 (*)     All other components within normal limits   SEDIMENTATION RATE - Abnormal; Notable for the following components:    Sed Rate 47 (*)     All other components within normal limits   C-REACTIVE PROTEIN - Abnormal; Notable for the following components:    CRP 14.9 (*)     All other components within normal limits   HIV 1 / 2 ANTIBODY    Narrative:     Release to patient->Immediate   HEPATITIS C ANTIBODY    Narrative:     Release to patient->Immediate     Results for orders placed or performed during the hospital encounter of 04/21/24   HIV 1/2 Ag/Ab (4th Gen)   Result Value Ref Range    HIV 1/2 Ag/Ab Non-reactive Non-reactive   Hepatitis C Antibody   Result Value Ref Range    Hepatitis C Ab Non-reactive Non-reactive   CBC auto differential   Result Value Ref Range    WBC 11.37 3.90 - 12.70 K/uL    RBC 4.19 4.00 - 5.40 M/uL    Hemoglobin 11.7 (L) 12.0 - 16.0 g/dL    Hematocrit 36.7 (L) 37.0 - 48.5 %    MCV 88 82 - 98 fL    MCH 27.9 27.0 - 31.0 pg    MCHC 31.9 (L) 32.0 - 36.0 g/dL    RDW 14.5 11.5 - 14.5 %    Platelets 339 150 - 450 K/uL    MPV 10.7 9.2 - 12.9 fL    Immature Granulocytes 0.5 0.0 - 0.5 %    Gran # (ANC) 6.9 1.8 -  7.7 K/uL    Immature Grans (Abs) 0.06 (H) 0.00 - 0.04 K/uL    Lymph # 3.2 1.0 - 4.8 K/uL    Mono # 0.8 0.3 - 1.0 K/uL    Eos # 0.4 0.0 - 0.5 K/uL    Baso # 0.10 0.00 - 0.20 K/uL    nRBC 0 0 /100 WBC    Gran % 60.7 38.0 - 73.0 %    Lymph % 27.7 18.0 - 48.0 %    Mono % 6.8 4.0 - 15.0 %    Eosinophil % 3.4 0.0 - 8.0 %    Basophil % 0.9 0.0 - 1.9 %    Differential Method Automated    Basic metabolic panel   Result Value Ref Range    Sodium 138 136 - 145 mmol/L    Potassium 3.9 3.5 - 5.1 mmol/L    Chloride 106 95 - 110 mmol/L    CO2 22 (L) 23 - 29 mmol/L    Glucose 86 70 - 110 mg/dL    BUN 13 6 - 20 mg/dL    Creatinine 0.6 0.5 - 1.4 mg/dL    Calcium 9.6 8.7 - 10.5 mg/dL    Anion Gap 10 8 - 16 mmol/L    eGFR >60.0 >60 mL/min/1.73 m^2   Sedimentation rate   Result Value Ref Range    Sed Rate 47 (H) 0 - 36 mm/Hr   C-reactive protein   Result Value Ref Range    CRP 14.9 (H) 0.0 - 8.2 mg/L            Imaging Results              X-Ray Knee 3 View Bilateral (Final result)  Result time 04/21/24 20:08:47      Final result by Thomas Hinojosa MD (04/21/24 20:08:47)                   Impression:      Nonspecific suprapatellar joint effusion on the right and questionable suprapatellar joint effusion on the left, new from prior.  No acute displaced fracture-dislocation identified.    Right more than left DJD, progressed since prior.      Electronically signed by: Thomas Hinojosa MD  Date:    04/21/2024  Time:    20:08               Narrative:    EXAMINATION:  XR KNEE 3 VIEW BILATERAL    CLINICAL HISTORY:  Pain in right knee    TECHNIQUE:  AP, lateral, and Merchant views of both knees were performed.    COMPARISON:  Bilateral knee series 12/14/2022    FINDINGS:  Nonspecific suprapatellar joint effusion on the right and questionable nonspecific suprapatellar joint effusion on the left, new from prior.  No displaced fracture, dislocation or destructive osseous process seen at either knee.  Moderate to advanced degenerative change at the  right medial and patellofemoral compartments, with mild degenerative change at the right lateral compartment.  Minimal to mild tricompartmental degenerative change on the left.  Degenerative change has progressed at both knees and prior but more so on the right.  No subcutaneous emphysema or radiodense retained foreign body.                                       Medications - No data to display  Medical Decision Making  Amount and/or Complexity of Data Reviewed  Labs: ordered.  Radiology: ordered.      Additional MDM:     X-Rays: I have independently interpreted X-Ray(s) - see notes. Chronic arthritic/degenerative changes to bilateral knees; joint effusion.                       Medical Decision Making:   History:   Old Medical Records: I decided to obtain old medical records.  Old Records Summarized: records from clinic visits.       <> Summary of Records: Previous records reviewed, clinic notes from sports medicine and previous x-rays were reviewed.  Medications were reviewed.  Recent procedure note for arthrocentesis was reviewed.  Initial Assessment:   48-year-old female with chronic bilateral knee pain and severe osteoarthritis followed closely by sports medicine recently completed knee joint injections, then went back due to uncontrolled pain and little improvement for therapeutic bilateral arthrocentesis, however patient still maintains uncontrolled pain is here in the ER stating that swelling and fluid has returned requesting to be tapped again.  Differential Diagnosis:   Acute exacerbation of chronic pain, joint effusions, osteoarthritis, etc.   I considered but have low suspicion for septic joint or gout.  Clinical Tests:   Lab Tests: Ordered and Reviewed  Radiological Study: Ordered and Reviewed  ED Management:  Vital signs reviewed, no fever or tachycardia; benign  Chart review completed   X-rays completed today were reviewed   Labs reviewed, normal WBC count - minimal elevated SED/CRP   I discussed the  case with the ER attending physician, emergent arthrocentesis for therapeutic tap not indicated despite pt request, do not suspect septic joint based on history and exam, knee joint without any erythema or increased warmth, able to fully range and ambulate; diagnostic tap not indicated at this time   Pt already taking NSAID and Gabapentin, with inadequate pain control reported - will provide small qty of Norco 5 to take in addition to her current regimen, pt advised to follow up with her knee specialist in the next 24-48 hours for re-evaluation and further management   Pt advised to return to the ER if unimproved or if worse in any way                 Clinical Impression:  Final diagnoses:  [M25.561, M25.562] Knee pain, bilateral (Primary)  [M25.461, M25.462] Bilateral knee swelling  [M17.0] Osteoarthritis of both knees, unspecified osteoarthritis type          ED Disposition Condition    Discharge Stable          ED Prescriptions       Medication Sig Dispense Start Date End Date Auth. Provider    HYDROcodone-acetaminophen (NORCO) 5-325 mg per tablet Take 1 tablet by mouth every 4 (four) hours as needed for Pain. 15 tablet 4/21/2024 4/24/2024 Gil Martinez PA-C          Follow-up Information       Follow up With Specialties Details Why Contact Info    Jitendra Valadez III, MD Family Medicine Schedule an appointment as soon as possible for a visit in 2 days  [address] [facility]  [address] [place] [id number]  [phone]      Tang Allison, DO Sports Medicine Schedule an appointment as soon as possible for a visit  Follow up with your orthopedist in the next 1-2 days for re-evaluation and further management. [address] [facility] [address]  [facility] [address] [address]  [phone]      [facility] - Emergency Dept Emergency Medicine  If symptoms worsen in any way [address] [facility] [address] [phone]             Gil Martinez PA-C  04/21/24 2027

## 2024-04-21 NOTE — ED TRIAGE NOTES
Nereida Narayanan, a 48 y.o. female presents to the ED w/ complaint of knee pain, had injections with no relief after a month    Triage note:  Chief Complaint   Patient presents with    Knee Pain     R reports difficulty bending it. Denies trauma. Had gel injections in it 1 month ago and states worsening pain     Review of patient's allergies indicates:   Allergen Reactions    Adhesive      Causes skin tearing and redness      Past Medical History:   Diagnosis Date    Allergy     Morbid obesity with BMI of 45.0-49.9, adult          APPEARANCE: awake and alert in NAD. PAIN  10/10  SKIN: warm, dry and intact. No breakdown or bruising.  MUSCULOSKELETAL: Patient moving all extremities spontaneously, no obvious swelling or deformities noted. Ambulates independently.  RESPIRATORY: Denies shortness of breath.Respirations unlabored.   CARDIAC: Denies CP, 2+ distal pulses; no peripheral edema  ABDOMEN: S/ND/NT, Denies nausea  : voids spontaneously, denies difficulty  Neurologic: AAO x 4; follows commands equal strength in all extremities; denies numbness/tingling. Denies dizziness

## 2024-04-23 ENCOUNTER — OFFICE VISIT (OUTPATIENT)
Dept: SPORTS MEDICINE | Facility: CLINIC | Age: 49
End: 2024-04-23
Payer: COMMERCIAL

## 2024-04-23 VITALS
DIASTOLIC BLOOD PRESSURE: 84 MMHG | WEIGHT: 229.94 LBS | HEIGHT: 59 IN | SYSTOLIC BLOOD PRESSURE: 159 MMHG | HEART RATE: 82 BPM | BODY MASS INDEX: 46.36 KG/M2

## 2024-04-23 DIAGNOSIS — G89.29 CHRONIC PAIN OF BOTH KNEES: Primary | ICD-10-CM

## 2024-04-23 DIAGNOSIS — M25.562 CHRONIC PAIN OF BOTH KNEES: Primary | ICD-10-CM

## 2024-04-23 DIAGNOSIS — M25.561 CHRONIC PAIN OF BOTH KNEES: Primary | ICD-10-CM

## 2024-04-23 DIAGNOSIS — M17.0 PRIMARY OSTEOARTHRITIS OF BOTH KNEES: ICD-10-CM

## 2024-04-23 PROCEDURE — 20611 DRAIN/INJ JOINT/BURSA W/US: CPT | Mod: 50,S$GLB,, | Performed by: ORTHOPAEDIC SURGERY

## 2024-04-23 PROCEDURE — 99214 OFFICE O/P EST MOD 30 MIN: CPT | Mod: 25,S$GLB,, | Performed by: ORTHOPAEDIC SURGERY

## 2024-04-23 PROCEDURE — 99999 PR PBB SHADOW E&M-EST. PATIENT-LVL III: CPT | Mod: PBBFAC,,, | Performed by: ORTHOPAEDIC SURGERY

## 2024-04-23 RX ORDER — TRIAMCINOLONE ACETONIDE 40 MG/ML
40 INJECTION, SUSPENSION INTRA-ARTICULAR; INTRAMUSCULAR
Status: COMPLETED | OUTPATIENT
Start: 2024-04-23 | End: 2024-04-23

## 2024-04-23 RX ADMIN — TRIAMCINOLONE ACETONIDE 40 MG: 40 INJECTION, SUSPENSION INTRA-ARTICULAR; INTRAMUSCULAR at 03:04

## 2024-04-23 NOTE — PROGRESS NOTES
CC: bilateral knee pain    Nereida is here today for follow up evaluation of her bilateral knee pain. Patient reports her pain is 10/10 today. It is the same in character and location as noted prior. She completed bilateral knee Orthovisc series 03/22/2024 and denies appreciating improvement following. She also saw Imer Hanson PA-C 04/02/2024 who performed bilateral knee aspirations. Due to severe pain, she sought care from the ED 04/21/2024 where imaging was updated and she was prescribed Norco 5.     Recall from visit on 02/20/2024  Nereida is here today for follow up evaluation of her bilateral knee pain. Patient reports her pain is 8/10 today. She had bilateral knee aspirations at visit 01/30/2024 without CSI as she was not eligible at that time. Her most recent bilateral knee CSI was 12/11/2023. She is interested in discussing other possible treatment options prior to her scheduled viscosupplementation injections in a few weeks.     Recall from visit on 01/22/2024  Nereida is here today for follow up evaluation of her bilateral knee pain. Patient reports her pain is 8/10 today. Her most recent bilateral knee CSI was 12/11/2023 and she admits to appreciating 2 weeks of improvement in her pain following. She states her insurance recently changed and she is interested in trying to get viscosupplementation authorized. She denies new falls or trauma since her last visit. She states that as her pain has gotten worse she has also been feeling more unstable especially with long periods of standing at work and [place].        PAST MEDICAL HISTORY:   Past Medical History:   Diagnosis Date    Allergy     Bilateral chronic knee pain     Morbid obesity with BMI of 45.0-49.9, adult     Osteoarthritis of both knees        PAST SURGICAL HISTORY:   Past Surgical History:   Procedure Laterality Date    ARTHROSCOPIC CHONDROPLASTY OF KNEE JOINT Right 9/9/2020    Procedure:  ARTHROSCOPY, KNEE, WITH CHONDROPLASTY;  Surgeon: Aiden Clarke DO;  Location: [facility] OR;  Service: Orthopedics;  Laterality: Right;     SECTION      KNEE ARTHROSCOPY W/ MENISCECTOMY Right 2020    Procedure: ARTHROSCOPY, KNEE, WITH MENISCECTOMY;  Surgeon: Aiden Clarke DO;  Location: [facility] OR;  Service: Orthopedics;  Laterality: Right;  Equipment: Scope; [employer] Truspar Meniscus Repair System; [employer] chondral drill set  Vendor: [employer]; [employer]  Table: Supine    KNEE ARTHROSCOPY W/ PLICA EXCISION Right 2020    Procedure: EXCISION, PLICA, KNEE, ARTHROSCOPIC;  Surgeon: Aiden Clarke DO;  Location: [facility] OR;  Service: Orthopedics;  Laterality: Right;       FAMILY HISTORY:   Family History   Problem Relation Name Age of Onset    Hypertension Mother      Alcohol abuse Father Yaya         none    Breast cancer Neg Hx      Ovarian cancer Neg Hx         SOCIAL HISTORY:   Social History     Socioeconomic History    Marital status: Single   Tobacco Use    Smoking status: Never    Smokeless tobacco: Never   Substance and Sexual Activity    Alcohol use: No    Drug use: No    Sexual activity: Yes     Partners: Male     Birth control/protection: None     Social Determinants of Health     Financial Resource Strain: Low Risk  (2023)    Overall Financial Resource Strain (CARDIA)     Difficulty of Paying Living Expenses: Not hard at all   Food Insecurity: No Food Insecurity (2023)    Hunger Vital Sign     Worried About Running Out of Food in the Last Year: Never true     Ran Out of Food in the Last Year: Never true   Transportation Needs: No Transportation Needs (2023)    PRAPARE - Transportation     Lack of Transportation (Medical): No     Lack of Transportation (Non-Medical): No   Physical Activity: Sufficiently Active (2023)    Exercise Vital Sign     Days of Exercise per Week: 5 days     Minutes of Exercise per Session: 30 min   Stress: No Stress Concern Present (2023)     "[employer] [place] [employer] - Occupational Stress Questionnaire     Feeling of Stress : Not at all   Social Connections: Unknown (12/11/2023)    Social Connection and Isolation Panel [NHANES]     Frequency of Communication with Friends and Family: Twice a week     Frequency of Social Gatherings with Friends and Family: Twice a week     Active Member of Clubs or Organizations: No     Attends Club or Organization Meetings: 1 to 4 times per year     Marital Status: Never    Housing Stability: Unknown (12/11/2023)    Housing Stability Vital Sign     Unable to Pay for Housing in the Last Year: No     Unstable Housing in the Last Year: No       MEDICATIONS:     Current Outpatient Medications:     diclofenac (VOLTAREN) 75 MG EC tablet, Take 1 tablet (75 mg total) by mouth 2 (two) times daily., Disp: 60 tablet, Rfl: 0    diclofenac sodium (VOLTAREN) 1 % Gel, Apply 2 g topically 4 (four) times daily., Disp: 20 g, Rfl: 2    furosemide (LASIX) 40 MG tablet, Take 1 tablet (40 mg total) by mouth once daily., Disp: 30 tablet, Rfl: 3    gabapentin (NEURONTIN) 100 MG capsule, Take 1 capsule (100 mg total) by mouth 3 (three) times daily., Disp: 90 capsule, Rfl: 6    ibuprofen (ADVIL,MOTRIN) 800 MG tablet, Take 1 tablet (800 mg total) by mouth 2 (two) times daily as needed for Pain., Disp: 90 tablet, Rfl: 0    methocarbamoL (ROBAXIN) 500 MG Tab, Take 1 tablet (500 mg total) by mouth 3 (three) times daily as needed (pain)., Disp: 45 tablet, Rfl: 0    Current Facility-Administered Medications:     triamcinolone acetonide injection 40 mg, 40 mg, Intra-articular, 1 time in Clinic/HOD,     triamcinolone acetonide injection 40 mg, 40 mg, Intra-articular, 1 time in Clinic/HOD,     ALLERGIES:   Review of patient's allergies indicates:   Allergen Reactions    Adhesive      Causes skin tearing and redness         PHYSICAL EXAMINATION:  BP (!) 159/84   Pulse 82   Ht 4' 11" (1.499 m)   Wt 104.3 kg (229 lb 15 oz)   BMI " 46.44 kg/m²   Vitals signs and nursing note have been reviewed.  General: In no acute distress, well developed, well nourished, no diaphoresis  Eyes: EOM full and smooth, no eye redness or discharge  HENT: normocephalic and atraumatic, neck supple, trachea midline, no nasal discharge, no external ear redness or discharge  Cardiovascular: 2+ and symmetric DP pulses bilaterally, no LE edema  Lungs: respirations non-labored, no conversational dyspnea   Abd: non-distended, no rigidity  MSK: no amputation or deformity, no swelling of extremities  Neuro: AAOx3, CN2-12 grossly intact  Skin: No rashes, warm and dry  Psychiatric: cooperative, pleasant, mood and affect appropriate for age    MUSCULOSKELETAL EXAM:    BILATERAL KNEE EXAMINATION   Affected side is compared to contralateral knee     Observation:  No edema, erythema, ecchymosis noted.  Suprapatellar effusion bilaterally  No muscle atrophy of the thighs and calves noted.  No obvious bony deformities noted.   No genu valgus/varum noted. No recurvatum noted.      Tenderness:  Patella - + bilaterally   Lateral joint line - + bilaterally  Quad tendon - none   Medial joint line - + bilaterally  Patellar tendon - none  Medial plica - none  Tibial tubercle - none   Lateral plica - none  Pes anserine - + b/l   MCL prox - none  Distal ITB - none   MCL distal - none  MFC - none    LCL prox - none  LFC - none    LCL distal - none  Tibia - none    Fibula - none    No obvious bursae, plicae, popliteal cysts, or tendon derangement palpated.          ROM:   Active extension to 0° on left without hyperextension, lag, or patellar J sign.   Active extension to 0° on right without hyperextension, lag, or patellar J sign.   + crepitus with extension bilaterally  Active flexion to 120° on left and 120° on right.    Strength: (bilaterally)  Knee Flexion - 5/5 on left and 5/5 on right  Knee Extension - 5/5 on left and 5/5 on right  Hip Flexion - 5/5 on left and 5/5 on right  Hip  Extension - 5/5 on left and 5/5 on right  Ankle dorsiflexion - 5/5 on left and 5/5 on right  Ankle Plantarflexion - 5/5 on left and 5/5 on right    Patellofemoral Exam:  Patellar ballottement - negative  Bulge sign - negative  Patellar grind - negative on left, positive on right  No patellar laxity with medial and lateral translation   No apprehension with medial and lateral patellar translation.     Ligament Testing:  Lachman's test - negative  No laxity with varus testing at 0 and 30 degrees.  No laxity with valgus testing at 0 and 30 degrees.    Neurovascular Examination:   Antalgic gait  Sensation intact to light touch in the obturator, lateral/intermediate/medial/posterior femoral cutaneous, saphenous, and common peroneal nerves bilaterally.  Pulses intact at the DP and PT arteries bilaterally.    Capillary refill intact <2 seconds in all toes bilaterally.      IMAGIN. X-ray obtained on 2022 due to bilateral knee pain  2. X-ray images were reviewed personally by me and then directly with patient.  3. FINDINGS: X-ray images obtained demonstrate tricompartmental degenerative changes, most pronounced in the medial compartment bilaterally.  Kellgren and Clay grade 3  4. IMPRESSION: As above.     1. X-ray obtained 2024 due to bilateral knee pain   2. X-ray images were reviewed personally by me and then directly with patient.  3. FINDINGS: X-ray images obtained demonstrate nonspecific suprapatellar joint effusion on the right and possible suprapatellar joint effusion on the left, tricompartmental degenerative changes, most pronounced at the medial compartment and progressed from . Kellgren and Clay grade 3  4. IMPRESSION: As above.       PROCEDURE:  Large Joint Aspiration/Injection  Knee joint, bilateral  Performed by: PEYMAN MCGUIRE  Authorized by: PEYMAN MCGUIRE  Consent Done?: Yes (Verbal)  Indications: Pain and joint effusion  Site marked: The procedure site was marked   Timeout:  "Prior to procedure the correct patient, procedure, and site was verified   Location: Knee joint, bilateral  Prep: Patient was prepped with Chlorhexidine and alcohol.  Ultrasound Guidance for needle placement: yes    Procedure RIGHT: Ethyl Chloride spray was used prior to skin puncture. After cold spray was applied, 2-4 cc's of 0.2% ropivacaine was injected into the skin and superficial tissue at the injection site using a 25G, 1.5" needle to form an anesthetic tunnel and ensure proper placement of the needle into the joint space. After local anesthetic was applied an 18G, 1.5 needle was used to enter the knee joint capsule under US guidance. 25 cc of clear synovial fluid was aspirated. Following aspiration, a 3 cc mixture of 1 cc of 40 mg/ml triamcinolone acetonide and 2 cc of 0.2% ropivacaine were injected into the knee joint.     Procedure LEFT: Ethyl Chloride spray was used prior to skin puncture. After cold spray was applied, 2-4 cc's of 0.2% ropivacaine was injected into the skin and superficial tissue at the injection site using a 25G, 1.5" needle to form an anesthetic tunnel and ensure proper placement of the needle into the joint space. After local anesthetic was applied an 18G, 1.5 needle was used to enter the knee joint capsule under US guidance. 14cc of clear synovial fluid was aspirated. Following aspiration, a 3 cc mixture of 1 cc of 40 mg/ml triamcinolone acetonide and 2 cc of 0.2% ropivacaine were injected into the knee joint.     Approach: superiorlateral  Medications: 40 mg triamcinolone acetonide 40 mg/mL  Patient tolerance: Patient tolerated the procedure well with no immediate complications. Improvement noted after aspiration. Patient was wrapped following bandaging.    Ultrasound guidance was used for needle localization with [employer] Edge 2, 15-6 MHz (L)probe(s). Images were saved and stored for documentation. Short and long axis images of the anterior knee were taken prior to injection " confirming presence of joint effusion. The knee joint well visualized. Dynamic visualization of the needles was continuous throughout the procedure and maintained good position and correct needle placement.    Triamcinolone: X2:  NDC: [address]-1  LOT: [id number]  EXP: 2025-11     ASSESSMENT:      ICD-10-CM ICD-9-CM   1. Chronic pain of both knees  M25.561 719.46    M25.562 338.29    G89.29    2. Primary osteoarthritis of both knees  M17.0 715.16       PLAN:  1-2.  Chronic bilateral knee pain/osteoarthritis - improved, recent exacerbation    - Nereida is following up today for her chronic bilateral knee pain which has been slowly worsening over several years.  She has also been appreciating increased swelling and instability which worsens with walking and long periods of standing at work.    - She completed bilateral knee Orthovisc series 03/22/2024 and denies appreciating improvement following. She also saw Imer Hanson PA-C 04/02/2024 who performed bilateral knee aspirations. Due to severe pain, she sought care from the ED 04/21/2024 where imaging was updated and she was prescribed Norco 5.     - We reviewed the natural history of osteoarthritis and a multipronged treatment approach. We reviewed the importance of addressing three different aspects to best manage this condition. Controlling the intra-articular immune reaction through medications and/or injections, improving local stability through bracing and/or injection, and improving functional stability through hip, core, and ankle strength, stability and mobility which may benefit from formal physical therapy.    - After discussing options, she elects to proceed with ultrasound guided bilateral knee aspiration and injection. See above for procedure detail.     - Discussed bilateral knee Zilretta injections and Iovera procedure in the future.    - Continue with Diclofenac 75 mg twice daily to help with pain and swelling.     - Continue with bilateral medial  hinged knee braces during activity. Met with DME today to further discuss fit.       Future planning includes - possible bilateral knee Zilretta injections, possible Iovera with Dr. Zhang    All questions were answered to the best of my ability and all concerns were addressed at this time.    Follow up for above.       Total time spent face-to face with patient counseling or coordinating care including prognosis, differential diagnosis, risks and benefits of treatment, instructions, compliance risk reductions as well as non-face-to-face time personally spent reviewing medial record, medical documentation, and coordination of care.     EST MINUTES X   68121 10-19    81892 20-29    11379 30-39 X   99215 40-54    NEW     10995 15-29    50015 30-44    85207 45-59    28921 60-74    PHONE      5-10    14572 11-20    13461 21-30

## 2024-04-24 DIAGNOSIS — M25.561 CHRONIC PAIN OF BOTH KNEES: ICD-10-CM

## 2024-04-24 DIAGNOSIS — G89.29 CHRONIC PAIN OF BOTH KNEES: ICD-10-CM

## 2024-04-24 DIAGNOSIS — M25.562 CHRONIC PAIN OF BOTH KNEES: ICD-10-CM

## 2024-04-25 RX ORDER — METHOCARBAMOL 500 MG/1
500 TABLET, FILM COATED ORAL 3 TIMES DAILY PRN
Qty: 45 TABLET | Refills: 0 | Status: SHIPPED | OUTPATIENT
Start: 2024-04-25

## 2024-04-29 NOTE — PROGRESS NOTES
"Subjective     Patient ID: Nereida Narayanan is a 48 y.o. female.    Chief Complaint: Consult    CC: weight    New pt to me, referred by Self, Aaareferral  No address on file , with Patient Active Problem List:     Weakness     Knee pain, right     Trichimoniasis     Morbid obesity     Osteoarthritis of both knees     BMI 45.0-49.9, adult     Osteoarthritis of ankle and foot     Left foot pain       No results found for: "HGBA1C"  Lab Results       Component                Value               Date                       LDLCALC                  153.8               04/12/2023                 CREATININE               0.6                 08/24/2023                Current attempts at weight loss: Drinking less soda.     Previous diet attempts: denies.     History of medication for loss: Phentermine last filled 1/10/23. Rxes from different providers. She did find it helped.   checked today     Heaviest weight:240#    Lightest weight: 185#    Goal weight: under 200#      Last eye exam:    3 years ago. No glaucoma per pt.      Provider:    Typical eating patterns:  Works at [employer] and [employer]. Lives with boyfriend. They share in cooking.   Breakfast: banana Weekends: same.     lunch: cheese burger from [employer]. Breakfast sandwich. Weekends: same.     dinner: pork chops or chicken with rice or broccoli. If out- mexican food.     snacks: ice cream. Cereal.     Beverages: coffee with cream and sugar. Water, diet soda, tea- AS. ETOH- rare.     Willingness to change:  10/10    Cardiac studies:   Normal sinus rhythm   Cannot rule out Anterior infarct ,age undetermined   Abnormal ECG   No previous ECGs available       BMR: 1402    PBF:  53.2%      Review of Systems       Objective   /80 (BP Location: Left arm, Patient Position: Sitting, BP Method: Large (Manual))   Pulse 98   Ht 5' (1.524 m)   Wt 102.1 kg (225 lb)   SpO2 97%   BMI 43.94 kg/m²    Physical Exam  Vitals reviewed.   Constitutional:       General: She " is not in acute distress.     Appearance: She is well-developed.      Comments: With severe obesity     HENT:      Head: Normocephalic and atraumatic.   Eyes:      General: No scleral icterus.     Pupils: Pupils are equal, round, and reactive to light.   Neck:      Thyroid: No thyromegaly.   Cardiovascular:      Rate and Rhythm: Normal rate.      Heart sounds: Normal heart sounds. No murmur heard.     No friction rub. No gallop.   Pulmonary:      Effort: Pulmonary effort is normal. No respiratory distress.      Breath sounds: Normal breath sounds. No wheezing.   Abdominal:      General: Bowel sounds are normal. There is no distension.      Palpations: Abdomen is soft.      Tenderness: There is no abdominal tenderness.   Musculoskeletal:         General: Normal range of motion.      Cervical back: Normal range of motion and neck supple.   Skin:     General: Skin is warm and dry.      Findings: No erythema.   Neurological:      Mental Status: She is alert and oriented to person, place, and time.      Cranial Nerves: No cranial nerve deficit.   Psychiatric:         Behavior: Behavior normal.         Judgment: Judgment normal.            Assessment and Plan     1. Class 3 severe obesity due to excess calories with serious comorbidity and body mass index (BMI) of 40.0 to 44.9 in adult  -     semaglutide, weight loss, (WEGOVY) 0.25 mg/0.5 mL PnIj; Inject 0.25 mg into the skin every 7 days.  Dispense: 2 mL; Refill: 0  -     semaglutide, weight loss, (WEGOVY) 0.5 mg/0.5 mL PnIj; Inject 0.5 mg into the skin every 7 days.  Dispense: 2 mL; Refill: 0    2. Primary osteoarthritis of both knees    3. Osteoarthritis of ankle and foot, unspecified laterality        1. Class 3 severe obesity due to excess calories with serious comorbidity and body mass index (BMI) of 40.0 to 44.9 in adult    - semaglutide, weight loss, (WEGOVY) 0.25 mg/0.5 mL PnIj; Inject 0.25 mg into the skin every 7 days.  Dispense: 2 mL; Refill: 0  - semaglutide,  weight loss, (WEGOVY) 0.5 mg/0.5 mL PnIj; Inject 0.5 mg into the skin every 7 days.  Dispense: 2 mL; Refill: 0  Start Wegovy 0.25 mg once a week x 1 month. At the end of that month, the dose will continue to increase monthly.       Decrease portions as soon as you start wegovy. Avoid fried, rich or greasy foods.      Some nausea in the first 2 weeks is not unusual.     If you get pain across the upper abdomen and around to your back, please call the office.       [email or web address] to sign up for coupon card.       Increase low impact activity as tolerated.  Avoid high impact activity, very heavy lifting or other exercise regimens that may cause discomfort.     1000 gabriela pb meal planner, meal idea and exercise handout given     2. Primary osteoarthritis of both knees  Increase low impact activity as tolerated.  Avoid high impact activity, very heavy lifting or other exercise regimens that may cause discomfort.     Optimize BMI     3. Osteoarthritis of ankle and foot, unspecified laterality    Increase low impact activity as tolerated.  Avoid high impact activity, very heavy lifting or other exercise regimens that may cause discomfort.     Optimize BMI            No follow-ups on file.

## 2024-04-30 ENCOUNTER — PATIENT MESSAGE (OUTPATIENT)
Dept: BARIATRICS | Facility: CLINIC | Age: 49
End: 2024-04-30

## 2024-04-30 ENCOUNTER — OFFICE VISIT (OUTPATIENT)
Dept: BARIATRICS | Facility: CLINIC | Age: 49
End: 2024-04-30
Payer: COMMERCIAL

## 2024-04-30 VITALS
WEIGHT: 225 LBS | SYSTOLIC BLOOD PRESSURE: 128 MMHG | DIASTOLIC BLOOD PRESSURE: 80 MMHG | HEART RATE: 98 BPM | BODY MASS INDEX: 44.17 KG/M2 | OXYGEN SATURATION: 97 % | HEIGHT: 60 IN

## 2024-04-30 DIAGNOSIS — E66.01 CLASS 3 SEVERE OBESITY DUE TO EXCESS CALORIES WITH SERIOUS COMORBIDITY AND BODY MASS INDEX (BMI) OF 40.0 TO 44.9 IN ADULT: Primary | ICD-10-CM

## 2024-04-30 DIAGNOSIS — M17.0 PRIMARY OSTEOARTHRITIS OF BOTH KNEES: ICD-10-CM

## 2024-04-30 DIAGNOSIS — M19.079 OSTEOARTHRITIS OF ANKLE AND FOOT, UNSPECIFIED LATERALITY: ICD-10-CM

## 2024-04-30 PROCEDURE — 99215 OFFICE O/P EST HI 40 MIN: CPT | Mod: S$GLB,,, | Performed by: INTERNAL MEDICINE

## 2024-04-30 PROCEDURE — 99999 PR PBB SHADOW E&M-EST. PATIENT-LVL IV: CPT | Mod: PBBFAC,,, | Performed by: INTERNAL MEDICINE

## 2024-04-30 RX ORDER — SEMAGLUTIDE 0.25 MG/.5ML
0.25 INJECTION, SOLUTION SUBCUTANEOUS
Qty: 2 ML | Refills: 0 | Status: SHIPPED | OUTPATIENT
Start: 2024-04-30 | End: 2024-06-12

## 2024-04-30 RX ORDER — SEMAGLUTIDE 0.5 MG/.5ML
0.5 INJECTION, SOLUTION SUBCUTANEOUS
Qty: 2 ML | Refills: 0 | Status: SHIPPED | OUTPATIENT
Start: 2024-04-30

## 2024-04-30 NOTE — PATIENT INSTRUCTIONS
"Start Wegovy 0.25 mg once a week x 1 month. At the end of that month, the dose will continue to increase monthly.       Decrease portions as soon as you start wegovy. Avoid fried, rich or greasy foods.      Some nausea in the first 2 weeks is not unusual.     If you get pain across the upper abdomen and around to your back, please call the office.       [email or web address] to sign up for coupon card.       Increase low impact activity as tolerated.  Avoid high impact activity, very heavy lifting or other exercise regimens that may cause discomfort.                     1000 calorie Meal Plan  STARCHES 80 CALORIES PER SERVING 15g CARB, 3g PROTEIN, 1g FAT  Servings per day   bread   tortilla   crackers   cooked cereals   dry cereals   pasta   rice   corn   popcorn   potato (small)   potato, mashed   sweet potato   squash, winter   cooked beans, peas, lentils (add 1 meat exchange)   1 slice   1 (6")   4-6 (3/4 oz)   1/2 cup   3/4 cup   1/2 cup   1/3 cup  1/2 cup   1 small light bag  1 (3 oz)   1/2 cup   1/3 cup   1 cup   1/2 cup Most starches are a good source of B vitamins   Choose whole grain foods such as 100% whole wheat bread and flour, brown rice, tortillas, etc. for nutrients and fiber.   Combine beans (starch & meat) with grains (starch) for their complimentary proteins and fiber   Combine grains (starch) with milk (milk) or cheese (meat) to compliment proteins     2   FRUIT 60 CALORIES PER SERVING 15g CARB    fresh fruit   banana  melon (cubes)   berries  canned fruit   dried fruit    1 small   1/2  ½ cup   ¾ cup  ½ cup   ¼ cup    Choose whole fruits for fiber   No fruit juices   2   DAIRY  CALORIES PER SERVING 12g CARB, 8g PROTEIN, 0-8g FAT    milk   yogurt  Protein soy or almond milk 1 cup   1 cup   1 cup Use unsweetened almond or soy milk with added protein  Avoid chocolate or flavored milk  Avoid yogurt with more than 8 gms of sugar. Gms of protein should be higher than grams of sugar               1 "   MEAT AND SUBSTITUES  CALORIES PER SERVING 7g Protein, 0-13g FAT    Meat,Seafood and fish   cheese   cottage cheese   egg   peanut butter   tofu or tempeh  cooked beans, peas, lentils (add 1 starch)  Quinoa (add 1 starch)   Nuts and seeds (½ serving protein + 1 fat)  Nutritional yeast  Morning Star grillers 1 oz     1 oz  1/4 cup     1   1.5 Tbsp   4 oz (1/2 cup)   1/2 cup              ¼ cup            2 tablespoons    1 alfonzo or ½ cup      Choose fish, seafood and lower fat cheeses  Limit frying or adding fat.      8   FATS 45 CALORIES PER SERVING     oil   mayonnaise   cream cheese   salad dressing   peanuts   avocado   butter or margarine    1 tsp   1 tsp   1 Tbsp   1 Tbsp   10   1/8   1 tsp Eat less fat.   Eat less saturated fat such as animal fat found in fatter meat, cheese, and butter. Also eat less hydrogenated fat.      2-3   VEGETABLES 25 CALORIES PER SERVING 5 g CARB, 2g PROTEIN    raw vegetables   cooked vegetables   tomato or vegetable juice 1 cup   1/2 cup     1/2 cup Choose dark green leafy and deep yellow vegetables such as spinach, zuchinni, squash, mushrooms, cauliflower ,broccoli, carrots, and peppers.   Unlimited       1000 CALORIE MEAL PLAN  Eat 3 small meals per day with 1-2 small snacks to keep you full throughout the day  Aim for at least 15 gms of protein at breakfast, at least 25 grams at lunch and at least 25 grams of protein at dinner.  Snacks should contain at least 5 grams of protein  Limit starches to 1 serving per meal or snack.  Keep your carbs whole grain or whole wheat  Limit your intake of refined sugar including sugary beverages ie sweet tea, lemonade, fruit punch             Fruit Protein Dairy Starch Fats Calories   Breakfast 1 2    370   Lunch  3  1 1 290   Dinner 1 3  1 1 315   Snack   1   120   Total 2 8 1 2 2 1085     Sample breakfasts:  2 scrambled eggs (use spray), 1 cup Protein Silk soy milk, 1 slice whole wheat toast, 1 small orange  Omelet made with 1 egg,  1-ounce low fat cheese and non-starchy veggies, 1 low fat plain yogurt with ½ banana  Plain yogurt with ¾ cup unsweetened cold cereal and ½ cup fresh fruit salad, 2 hardboiled eggs  Sample lunches:  ½ whole wheat bagel topped with 3 ounces of low fat cheese melted in oven with a wild green salad with 1 TBSP dressing  ¾ cup cooked beans with 6 whole grain crackers, 10 roasted peanuts  ½ medium baked potato with 3 ounces of low fat cheddar cheese and 1 TBSP  sour cream  1 small wheat tortilla brushed with 1 tsp olive oil then brushed with pizza sauce and 4 ounces low fat cheese, sliced mushrooms and green peppers broiled until cheese is melted.  ½ cup chopped melon    Sample Dinners:  4 ounces of tuna pan seared in 1 tsp olive oil, ½ baked small sweet potato and 2 small plums  ½ cup chick peas, 1 cup cauliflower sauteed with 1 tbsp chopped onion, 1 tsp oil, and 2 tsp stone powder. Add low sodium vegetable stock to desired consistency. Serve with ½ cup brown rice  Red beans seasoned with onions and bell peppers served over cauliflower rice  1 cup cooked green or brown lentils served with ½ cup cooked quinoa and chopped tomatoes and cucumbers and 1 tbsp feta cheese  Sample Snacks:  1 cup of Protein Silk Soy milk with 3 squares deacon crackers  3/4 ounce Triscuits with 1 ounce cubed cheese  Chobani Triple Zero yogurt with ¾ cup unsweetened cereal  ½ cup edamame (shelled)    Meal Ideas for Regular Bariatric Diet  *Recipes and products available at [email or web address]      Breakfast: (15-20g protein)    - Egg white omelet: 2 egg whites or ½ cup Egg Beaters. (Optional proteins: cheese, shrimp, black beans, chicken, sliced turkey) (Optional veggies: tomatoes, salsa, spinach, mushrooms, onions, green peppers, or small slice avocado)     - Egg and sausage: 1 egg or ¼ cup Egg Beaters (any variety), with 1 alfonzo or 2 links of Turkey sausage or Veggie breakfast sausage ([employer] or [employer])    - Crust-less breakfast  quiche: To make a glass pie dish, mix 4oz part skim Ricotta, 1 cup skim milk, and 2 eggs as your base. Add protein: shredded cheese, sliced lean ham or turkey, turkey vela/sausage. Add veggies: tomato, onion, green onion, mushroom, green pepper, spinach, etc.    - Yogurt parfait: Mix 1 - 6oz container Dannon Light N Fit vanilla yogurt, with ¼ cup Kashi Go Lean cereal    - Cottage cheese and fruit: ½ cup part-skim cottage cheese or ricotta cheese topped with fresh fruit or sugar free preserves     - Cristela Gaviria's Vanilla Egg custard* (add 2 Tbsp instant coffee granules to make Cappuccino Custard*)    - Hi-Protein café latte (skim milk, decaf coffee, 1 scoop protein powder). Optional to add Sugar free syrup or extract flavoring.    Lunch: (20-30g protein)    - ½ cup Black bean soup (Homemade or Progresso), with ¼ cup shredded low-fat cheese. Top with chopped tomato or fresh salsa.     - Lean deli turkey breast and low-fat sliced cheese, mustard or light clark to moisten, rolled up together, or wrapped in a Jude lettuce leaf    - Chicken salad made from dinner leftovers, moisten with low-fat salad dressing or light clark. Also try leftover salmon, shrimp, tuna or boiled eggs. Serve ½ cup over dark green salad    - Fat-free canned refried beans, topped with ¼ cup shredded low-fat cheese. Top with chopped tomato or fresh salsa.     - Greek salad: Top mixed greens with 1-2oz grilled chicken, tomatoes, red onions, 2-3 kalamata olives, and sprinkle lightly with feta cheese. Spritz with Balsamic vinegar to taste.     - Crust-less lunch quiche: To make a glass pie dish, mix 4oz part skim Ricotta, 1 cup skim milk, and 2 eggs as your base. Add protein: shredded cheese, sliced lean ham or turkey, shrimp, chicken. Add veggies: tomato, onion, green onion, mushroom, green pepper, spinach, artichoke, broccoli, etc.    - Pizza bake: tomato sauce, low-fat shredded mozzarella and turkey pepperoni or Swazi vela. Add any  veggies.    - Cucumber crab bites: Spread ¼ cup crab dip (lump crabmeat + light cream cheese and green onions) over sliced cucumber.     - Chicken with light spinach and artichoke dip*: Puree in : 6oz cooked and drained spinach, 2 cloves garlic, 1 can cannelloni beans, ½ cup chopped green onions, 1 can drained artichoke hearts (not marinated in oil), lemon juice and basil. Mix in 2oz chopped up chicken.    Supper: (20-30g protein)    - Serve grilled fish over dark green salad tossed with low-fat dressing, served with grilled asparagus hua     - Rotisserie chicken salad: served with sliced strawberries, walnuts, fat-free feta cheese crumbles and 1 tbsp Halls Own Light Raspberry [place] Vinaigrette    - Shrimp cocktail: Dip cold boiled shrimp in homemade low-sugar cocktail sauce (1/2 cup Yanely One Carb ketchup, 2 tbsp horseradish, 1/4 tsp hot sauce, 1 tsp Worcestershire sauce, 1 tbsp freshly-squeezed lemon juice). Serve with dark green salad, walnuts, and crumbled blue cheese drizzled with olive oil and Balsamic vinegar    - Tuna Melt: Spread tuna salad onto 2 thick slices of tomato. Top with low-fat cheese and broil until cheese is melted. May also be made with chicken salad of shrimp salad. [place] with different types of cheeses.    - Homemade low-fat Chili using extra lean ground beef or ground turkey. Top with shredded cheese and salsa as desired. May add dollop fat-free sour cream if desired    - Dinner Omelet with shrimp or chicken and onion, green peppers and chives.    - No noodle lasagna: Use sliced zucchini or eggplant in place of noodles.  Layer with part skim ricotta cheese and low sugar meat sauce (use very lean ground beef or ground turkey).    - Mexican chicken bake: Bake chunks of chicken breast or thigh with taco seasoning, Pace brand enchilada sauce, green onions and low-fat cheese. Serve with ¼ cup black beans or fat free refried beans topped with chopped tomatoes or  salsa.    - Deandre frozen meatballs, simmered in Classico Marinara sauce. Different flavors of salsa or spaghetti sauce create different dishes! Sprinkle with parmesan cheese. Serve with grilled or steamed veggies, or a dark green salad.    - Simmer boneless skinless chicken thigh chunks in Classico Marinara sauce or roasted salsa until tender with chopped onion, bell pepper, garlic, mushrooms, spinach, etc.     - Hamburger, without the bun, dressed the way you like. Served with grilled or steamed veggies.    - Eggplant parmesan: Bake slices of eggplant at 350 degrees for 15 minutes. Layer tomato sauce, sliced eggplant and low-fat mozzarella cheese in a baking dish and cover with foil. Bake 30-40 more minutes or until bubbly. Uncover and bake at 400 degrees for about 15 more minutes, or until top is slightly crisp.    - Fish tacos: grilled/baked white fish, wrapped in Jude lettuce leaf, topped with salsa, shredded low-fat cheese, and light coleslaw.    Snacks: (100-200 calories; >5g protein)    - 1 low-fat cheese stick with 8 cherry tomatoes or 1 serving fresh fruit  - 4 thin slices fat-free turkey breast and 1 slice low-fat cheese  - 4 thin slices fat-free honey ham with wedge of melon  - 1/4 cup unsalted nuts with ½ cup fruit  - 6-oz container Dannon Light n Fit vanilla yogurt, topped with 1oz unsalted nuts         - apple, celery or baby carrots spread with 2 Tbsp natural peanut butter or almond butter   - apple slices with 1 oz slice low-fat cheese  - celery, cucumber, bell pepper or baby carrots dipped in ¼ cup hummus bean spread or light spinach and artichoke dip (*recipe in lunch section)  - 100 calorie bag microwave light popcorn with 3 tbsp grated parmesan cheese  - Mick Links Beef Steak - 14g protein! (similar to beef jerky)  - 2 wedges [place] Cow - Light Herb & Garlic Cheese with sliced cucumber or green bell pepper  - 1/2 cup low-fat cottage cheese with ¼ cup fruit or ¼ cup salsa  - RTD Protein  drinks: Atkins, Low Carb Slim Fast, EAS light, Muscle Milk Light, etc.  - Homemade Protein drinks: GNC Soy95, Isopure, Nectar, UNJURY, Whey Gourmet, etc. Mix 1 scoop powder with 8oz skim/1% milk or light soymilk.  - Protein bars: Atkins, EAS, Pure Protein, Think Thin, Detour, etc. Must have 0-4 grams sugar - Read the label.    Takeout Options: No more than twice/week  Deli - Salads (no pasta or rice), meats, cheeses. Roasted chicken. Lox (salmon)    Mexican - Platters which don't include tortillas, chips, or rice. Go easy on the beans. Example: Fajitas without the tortillas. Ask the  not to bring chips to the table if they are too tempting.    Greek - Meat or fish and vegetable, but no bread or rice. Including hummus, baba ganoush, etc, is OK. Most sit-down Greek restaurants can provide you with cucumber slices for dipping instead of jagdeep bread.    Fast Food (Avoid as much as possible) - Salads (no croutons and limit salad dressing to 2 tbsp), grilled chicken sandwich without the bun and ask for no clark. Gabrielas low fat chili or Taco Bell pintos and cheese.    BBQ - The meats are fine if you ask for sauces on the side, but most of the traditional side dishes are loaded with carbs. Williams slaw, baked beans and BBQ sauce are typically made with sugar.    Chinese - Nothing deep-fried, no rice or noodles. Many Chinese sauces have starch and sugar in them, so you'll have to use your judgement. If you find that these sauces trigger cravings, or cause Dumping, you can ask for the sauce to be made without sugar or just use soy sauce.      Exercise should be some cardiovascular and some resistance training(see below).      STRENGTHENING  An adequate resistance training program could include the following types of exercise, focusing on the major muscle groups. One can use 5 to 10 pound dumbbells for those exercises that require the use of weight. At home, one can use a household item that provides a comfortable weight,  such as a milk carton or beverage container. These exercises can also be performed without weights. Add some type of resistance training 2-3 days a week. These can be body weight exercises, light weight or elastic bands. [employer] and [employer] are great sources for free work out plans and videos.       Chest (Bench Press): Hold the weights with your hands. Lying on a flat surface, with knees bent and feet flat, slowly bring the weights to the chest area with palms facing upward. Begin to exhale and press the weights up, fully extending the arms, and keeping them above your eyes. Inhale as you lower them to the starting position and repeat the movement.  Back (Bent-Over Row): Start by placing your feet shoulder-width apart.  a weight with each hand just outside the knees. Keeping the back straight and the knees flexed, slightly bend forward at the waist. Let the arms hang naturally while holding the weights. From this starting position, pull the weights to the lower abdomen, keeping the elbows close to the body. Exhale as you pull. Return to the starting position, inhaling as you go.  Arms (Bicep Curls): Hold a weight in each hand, with elbows at your sides, palms facing forward. The back should be straight, the chest flared outward. Begin to bend your right arm up first while exhaling, keeping the elbow totally stationary. Wait until the right arm goes completely down to the fully extended position, and then begin to curl the left arm. Each arm curled completes one full repetition.  Shoulders (Lateral Raises): Place the feet a few inches apart with the knees bent slightly. Keep the back erect as you lean forward slightly. With the weights in front of your thighs and palms facing together, begin to slowly raise them up to the side until parallel with the floor. Lower the weights to your thighs, and repeat.  Legs (Stiff Leg Dead Lift): Start by standing straight, holding the weights close to your sides, nearly  "touching your thighs. Keep the weights close to the body--this protects the back. The back must stay straight. Bend at the waist as far forward as you comfortably can while keeping your legs straight, and begin to feel the pull in your hamstrings as you lower the weights toward the ground. Slowly return to the starting position, keeping the back straight.  Legs (Dumbbell Lunge): Hold a weight in each hand, arms hanging at your sides. Step out with one leg, keeping the back straight. It is important to step out far enough so that the knee does not extend over the toe. This puts too much stress on the knee. Go down far enough so that the opposite knee nearly touches the ground. Keep this stance and repeat the lunging motion for several repetitions.  Abdominals: Do not perform standard sit-ups as these could hurt the lower back. Rather, focus on the following 2 exercises: (1) Obliques--Lie on your back with the knees flexed in the bent sit-up position. From this position, bring both knees down to the ground. With the back remaining flat, begin to flex your body toward your toes ("crunch"). Bring your shoulders up off the ground, but go slowly, controlling your momentum. Repeat this for 10 to 15 times. (2) Seated bench kicks/briana knives--Sit on the end of a bench or chair with the hands placed behind the buttocks. Begin to kick the legs outward with the knees bent slightly--at the same time, lean back to extend the torso. Come back to the beginning position and repeat this motion 10 to 15 times.      "

## 2024-05-31 ENCOUNTER — PATIENT MESSAGE (OUTPATIENT)
Dept: SPORTS MEDICINE | Facility: CLINIC | Age: 49
End: 2024-05-31
Payer: COMMERCIAL

## 2024-05-31 DIAGNOSIS — M17.0 PRIMARY OSTEOARTHRITIS OF BOTH KNEES: ICD-10-CM

## 2024-05-31 DIAGNOSIS — M25.562 CHRONIC PAIN OF BOTH KNEES: ICD-10-CM

## 2024-05-31 DIAGNOSIS — G89.29 CHRONIC PAIN OF BOTH KNEES: ICD-10-CM

## 2024-05-31 DIAGNOSIS — M25.561 CHRONIC PAIN OF BOTH KNEES: ICD-10-CM

## 2024-06-01 DIAGNOSIS — M17.0 OSTEOARTHRITIS OF BOTH KNEES, UNSPECIFIED OSTEOARTHRITIS TYPE: ICD-10-CM

## 2024-06-03 ENCOUNTER — TELEPHONE (OUTPATIENT)
Dept: BARIATRICS | Facility: CLINIC | Age: 49
End: 2024-06-03
Payer: COMMERCIAL

## 2024-06-03 ENCOUNTER — PATIENT MESSAGE (OUTPATIENT)
Dept: SPORTS MEDICINE | Facility: CLINIC | Age: 49
End: 2024-06-03
Payer: COMMERCIAL

## 2024-06-03 RX ORDER — GABAPENTIN 100 MG/1
100 CAPSULE ORAL 3 TIMES DAILY
Qty: 90 CAPSULE | Refills: 6 | Status: SHIPPED | OUTPATIENT
Start: 2024-06-03 | End: 2024-12-30

## 2024-06-03 RX ORDER — IBUPROFEN 800 MG/1
800 TABLET ORAL 2 TIMES DAILY PRN
Qty: 90 TABLET | Refills: 0 | Status: SHIPPED | OUTPATIENT
Start: 2024-06-03

## 2024-06-03 NOTE — TELEPHONE ENCOUNTER
----- Message from Ina Bo sent at 6/3/2024  8:15 AM CDT -----  Regarding: appt access  Contact: pt [phone]  Pt calling to reschedule appt due to work. Pls call

## 2024-06-12 ENCOUNTER — OFFICE VISIT (OUTPATIENT)
Dept: BARIATRICS | Facility: CLINIC | Age: 49
End: 2024-06-12
Payer: COMMERCIAL

## 2024-06-12 VITALS — DIASTOLIC BLOOD PRESSURE: 76 MMHG | HEART RATE: 67 BPM | SYSTOLIC BLOOD PRESSURE: 142 MMHG | OXYGEN SATURATION: 98 %

## 2024-06-12 DIAGNOSIS — E66.01 CLASS 3 SEVERE OBESITY DUE TO EXCESS CALORIES WITH SERIOUS COMORBIDITY AND BODY MASS INDEX (BMI) OF 40.0 TO 44.9 IN ADULT: Primary | ICD-10-CM

## 2024-06-12 PROCEDURE — 99999 PR PBB SHADOW E&M-EST. PATIENT-LVL IV: CPT | Mod: PBBFAC,,, | Performed by: INTERNAL MEDICINE

## 2024-06-12 PROCEDURE — 99213 OFFICE O/P EST LOW 20 MIN: CPT | Mod: S$GLB,,, | Performed by: INTERNAL MEDICINE

## 2024-06-12 RX ORDER — SEMAGLUTIDE 1 MG/.5ML
1 INJECTION, SOLUTION SUBCUTANEOUS
Qty: 2 ML | Refills: 0 | Status: SHIPPED | OUTPATIENT
Start: 2024-07-12

## 2024-06-12 RX ORDER — SEMAGLUTIDE 1.7 MG/.75ML
1.7 INJECTION, SOLUTION SUBCUTANEOUS
Qty: 12 ML | Refills: 0 | Status: SHIPPED | OUTPATIENT
Start: 2024-08-12

## 2024-06-12 NOTE — PATIENT INSTRUCTIONS
Start Wegovy 0.5 mg once a week x 1 month. At the end of that month, the dose will continue to increase monthly.       Decrease portions as soon as you start wegovy. Avoid fried, rich or greasy foods.      Some nausea in the first 2 weeks is not unusual.     If you get pain across the upper abdomen and around to your back, please call the office.       [email or web address] to sign up for coupon card.       Increase low impact activity as tolerated.  Avoid high impact activity, very heavy lifting or other exercise regimens that may cause discomfort.     1000 gabriela pb meal planner, meal idea and exercise handout given previously.      Sample Weight Training Plan ( adapted from [facility]):    1.Goblet Squat  How to:    Stand with feet hip-width apart and hold a weight vertically in front of chest, elbows pointing toward the floor.  Push hips back and bend knees to lower into a squat.  Drive through heels to stand back up to starting position. That's 1 rep. Complete three to five reps with a heavy weight.      2.Bent-Over Row  How to:    With a dumbbell in each hand and feet under hips, hinge at hips with knees slightly bent and arms just in front of legs.  Drive one elbow back toward hips, feeling shoulder blades squeeze together, pulling weight toward side body.  Slowly lower weight back down, then repeat with other arm. That's 1 rep. Complete three to five reps with a medium-heavy weight.        3.Lateral Lunge  How to:    Holding a weight at chest or dumbbells at each side, stand up straight with feet hip-width apart.  Take a large step to the right, sit hips back, and lower down until right knee is nearly parallel with the floor. Your left leg should be straight.  Return to start. That's 1 rep. Complete 10 reps on each side.      4.Chest Press  How to:    Lie flat on back, or on a bench, with feet flat on the ground. With a dumbbell in each hand, extend arms directly over shoulders, palms facing toward  feet.  Squeeze shoulder blades together and slowly bend elbows, lowering the weights out to the side, parallel with shoulders, until elbows form 90-degree angles.  Slowly drive the dumbbells back up to start, squeezing shoulder blades the entire time. Thats 1 rep. Complete three to five reps with a medium-heavy weight.      5.Split Stance Shoulder Press    How to:    Grab a pair of dumbbells or a resistance band. Stagger stance into a wide step, one foot forward and one back with hips squared, and hold the weights or band just above shoulders, elbows close to sides.  Leaning forward ever so slightly, bend both knees, and press through front heel while simultaneously lifting the weights or band to the meet, keeping elbows forward and arms in line with your ears.  Lower weights or band back to shoulders. That's 1 rep. Do 10 reps, alternating side for each set.        6.Alternating Reverse Lunge To Bicep Curl  How to:    Grab a pair of dumbbells and hold them at arm's length next to sides, palms facing each other. Stand tall with feet hip-width apart.  Step backward with right leg and lower body until front knee is bent 90 degrees. At the same time as you lunge, curl both dumbbells up to shoulders.  Lower the dumbbells as you return to the starting position. Step back with the other leg and repeat.That's 1 rep. Complete 12 reps with a medium weight.

## 2024-06-12 NOTE — PROGRESS NOTES
Subjective     Patient ID: Nereida Narayanan is a 49 y.o. female.    Chief Complaint: Follow-up    Pt here today for follow-up. Has lost 0.6 lbs (-0.9 lbs muscle. +2.1 lbs fat).   Was to start 100 gabriela pb meal planner, exercise and started wegovy, currently on 0.25 mg weekly.   Has been cutting back sugary drinks.   She is eating more fruit. Skipping meals at times. Has nit used planner.   Exercise.- increasing activity. Has been having trouble with her knee.     New BMR: 1376    New PBF: 54.2%       Initial:      BMR: 1402    PBF:  53.2%      History of medication for loss: Phentermine last filled 1/10/23. Rxes from different providers. She did find it helped.   checked today           Review of Systems       Objective   BP (!) 142/76   Pulse 67   LMP 06/11/2024 (Exact Date)   SpO2 98%    Physical Exam  Vitals reviewed.   Constitutional:       General: She is not in acute distress.     Appearance: She is well-developed.      Comments: With severe obesity     HENT:      Head: Normocephalic and atraumatic.   Eyes:      General: No scleral icterus.     Pupils: Pupils are equal, round, and reactive to light.   Cardiovascular:      Rate and Rhythm: Normal rate.   Pulmonary:      Effort: Pulmonary effort is normal. No respiratory distress.   Musculoskeletal:         General: Normal range of motion.   Skin:     General: Skin is warm and dry.      Findings: No erythema.   Neurological:      Mental Status: She is alert and oriented to person, place, and time.      Cranial Nerves: No cranial nerve deficit.   Psychiatric:         Behavior: Behavior normal.         Judgment: Judgment normal.            Assessment and Plan     1. Class 3 severe obesity due to excess calories with serious comorbidity and body mass index (BMI) of 40.0 to 44.9 in adult  -     semaglutide, weight loss, (WEGOVY) 1 mg/0.5 mL PnIj; Inject 1 mg into the skin every 7 days.  Dispense: 2 mL; Refill: 0  -     semaglutide, weight loss, (WEGOVY) 1.7  mg/0.75 mL PnIj; Inject 1.7 mg into the skin every 7 days.  Dispense: 12 mL; Refill: 0          1. Class 3 severe obesity due to excess calories with serious comorbidity and body mass index (BMI) of 40.0 to 44.9 in adult    - semaglutide, weight loss, (WEGOVY) 0.25 mg/0.5 mL PnIj; Inject 0.25 mg into the skin every 7 days.  Dispense: 2 mL; Refill: 0  - semaglutide, weight loss, (WEGOVY) 0.5 mg/0.5 mL PnIj; Inject 0.5 mg into the skin every 7 days.  Dispense: 2 mL; Refill: 0  Start Wegovy 0.5 mg once a week x 1 month. At the end of that month, the dose will continue to increase monthly.       Decrease portions as soon as you start wegovy. Avoid fried, rich or greasy foods.      Some nausea in the first 2 weeks is not unusual.     If you get pain across the upper abdomen and around to your back, please call the office.       [email or web address] to sign up for coupon card.       Increase low impact activity as tolerated.  Avoid high impact activity, very heavy lifting or other exercise regimens that may cause discomfort.     1000 gabriela pb meal planner, meal idea and exercise handout given previously.      Sample Weight Training Plan given           No follow-ups on file.

## 2024-06-21 ENCOUNTER — PATIENT MESSAGE (OUTPATIENT)
Dept: SPORTS MEDICINE | Facility: CLINIC | Age: 49
End: 2024-06-21
Payer: COMMERCIAL

## 2024-06-24 ENCOUNTER — OFFICE VISIT (OUTPATIENT)
Dept: SPORTS MEDICINE | Facility: CLINIC | Age: 49
End: 2024-06-24
Payer: COMMERCIAL

## 2024-06-24 VITALS
WEIGHT: 225.31 LBS | HEART RATE: 87 BPM | BODY MASS INDEX: 44.23 KG/M2 | HEIGHT: 60 IN | DIASTOLIC BLOOD PRESSURE: 81 MMHG | SYSTOLIC BLOOD PRESSURE: 141 MMHG

## 2024-06-24 DIAGNOSIS — M25.562 CHRONIC PAIN OF BOTH KNEES: Primary | ICD-10-CM

## 2024-06-24 DIAGNOSIS — G89.29 CHRONIC PAIN OF BOTH KNEES: Primary | ICD-10-CM

## 2024-06-24 DIAGNOSIS — M17.0 PRIMARY OSTEOARTHRITIS OF BOTH KNEES: ICD-10-CM

## 2024-06-24 DIAGNOSIS — M25.561 CHRONIC PAIN OF BOTH KNEES: Primary | ICD-10-CM

## 2024-06-24 PROCEDURE — 99214 OFFICE O/P EST MOD 30 MIN: CPT | Mod: 25,S$GLB,, | Performed by: ORTHOPAEDIC SURGERY

## 2024-06-24 PROCEDURE — 99999 PR PBB SHADOW E&M-EST. PATIENT-LVL III: CPT | Mod: PBBFAC,,, | Performed by: ORTHOPAEDIC SURGERY

## 2024-06-24 PROCEDURE — 20611 DRAIN/INJ JOINT/BURSA W/US: CPT | Mod: 50,S$GLB,, | Performed by: ORTHOPAEDIC SURGERY

## 2024-06-24 NOTE — PROGRESS NOTES
CC: bilateral knee pain    Nereida is here today for follow up evaluation of her bilateral knee pain. Patient reports her pain is 6/10 today. Her most recent bilateral knee CSI was at visit 04/23/2024 and she admits to appreciating excellent relief for 7 weeks following. She reports over the past week she has noticed return of pain and tightness with the sensation of swelling in both knees. Left is currently slightly worse than right. Reports crepitus but no other mechanical symptoms. Denies changes in symptomatology. Continues to ambulate ~5 miles a day w/o assistive devices at her job. Wears knee braces at times but not constantly as they are uncomfortable and left one falls off intermittently. She does report that she has been taking the Diclofenac less frequently over this time as well. She is interested in repeating aspiration today if possible. No other/new MSK complaints reported today.     Recall from visit on 04/23/2024  Nereida is here today for follow up evaluation of her bilateral knee pain. Patient reports her pain is 10/10 today. It is the same in character and location as noted prior. She completed bilateral knee Orthovisc series 03/22/2024 and denies appreciating improvement following. She also saw Imer Hanson PA-C 04/02/2024 who performed bilateral knee aspirations. Due to severe pain, she sought care from the ED 04/21/2024 where imaging was updated and she was prescribed Norco 5.     Recall from visit on 02/20/2024  Nereida is here today for follow up evaluation of her bilateral knee pain. Patient reports her pain is 8/10 today. She had bilateral knee aspirations at visit 01/30/2024 without CSI as she was not eligible at that time. Her most recent bilateral knee CSI was 12/11/2023. She is interested in discussing other possible treatment options prior to her scheduled viscosupplementation injections in a few weeks.     Recall from visit on 01/22/2024  Nereida is here today for follow up  evaluation of her bilateral knee pain. Patient reports her pain is 8/10 today. Her most recent bilateral knee CSI was 2023 and she admits to appreciating 2 weeks of improvement in her pain following. She states her insurance recently changed and she is interested in trying to get viscosupplementation authorized. She denies new falls or trauma since her last visit. She states that as her pain has gotten worse she has also been feeling more unstable especially with long periods of standing at work and [place].        PAST MEDICAL HISTORY:   Past Medical History:   Diagnosis Date    Allergy     Bilateral chronic knee pain     Morbid obesity with BMI of 45.0-49.9, adult     Osteoarthritis of both knees        PAST SURGICAL HISTORY:   Past Surgical History:   Procedure Laterality Date    ARTHROSCOPIC CHONDROPLASTY OF KNEE JOINT Right 2020    Procedure: ARTHROSCOPY, KNEE, WITH CHONDROPLASTY;  Surgeon: Aiden Clarke DO;  Location: [facility] OR;  Service: Orthopedics;  Laterality: Right;     SECTION      KNEE ARTHROSCOPY W/ MENISCECTOMY Right 2020    Procedure: ARTHROSCOPY, KNEE, WITH MENISCECTOMY;  Surgeon: Aiden Clarke DO;  Location: [facility] OR;  Service: Orthopedics;  Laterality: Right;  Equipment: Scope; [employer] Truspar Meniscus Repair System; Mobile chondral drill set  Vendor: [employer]; Mobile  Table: Supine    KNEE ARTHROSCOPY W/ PLICA EXCISION Right 2020    Procedure: EXCISION, PLICA, KNEE, ARTHROSCOPIC;  Surgeon: Aiden Clarke DO;  Location: [facility] OR;  Service: Orthopedics;  Laterality: Right;       FAMILY HISTORY:   Family History   Problem Relation Name Age of Onset    Hypertension Mother      Alcohol abuse Father Yaya         none    Breast cancer Neg Hx      Ovarian cancer Neg Hx         SOCIAL HISTORY:   Social History     Socioeconomic History    Marital status: Single   Tobacco Use    Smoking status: Never    Smokeless tobacco:  Never   Substance and Sexual Activity    Alcohol use: No    Drug use: No    Sexual activity: Yes     Partners: Male     Birth control/protection: None     Social Determinants of Health     Financial Resource Strain: Low Risk  (12/11/2023)    Overall Financial Resource Strain (CARDIA)     Difficulty of Paying Living Expenses: Not hard at all   Food Insecurity: No Food Insecurity (12/11/2023)    Hunger Vital Sign     Worried About Running Out of Food in the Last Year: Never true     Ran Out of Food in the Last Year: Never true   Transportation Needs: No Transportation Needs (12/11/2023)    PRAPARE - Transportation     Lack of Transportation (Medical): No     Lack of Transportation (Non-Medical): No   Physical Activity: Sufficiently Active (12/11/2023)    Exercise Vital Sign     Days of Exercise per Week: 5 days     Minutes of Exercise per Session: 30 min   Stress: No Stress Concern Present (12/11/2023)    [employer] - Occupational Stress Questionnaire     Feeling of Stress : Not at all   Housing Stability: Unknown (12/11/2023)    Housing Stability Vital Sign     Unable to Pay for Housing in the Last Year: No     Unstable Housing in the Last Year: No       MEDICATIONS:     Current Outpatient Medications:     diclofenac (VOLTAREN) 75 MG EC tablet, Take 1 tablet (75 mg total) by mouth 2 (two) times daily., Disp: 60 tablet, Rfl: 0    diclofenac sodium (VOLTAREN) 1 % Gel, Apply 2 g topically 4 (four) times daily., Disp: 20 g, Rfl: 2    furosemide (LASIX) 40 MG tablet, Take 1 tablet (40 mg total) by mouth once daily., Disp: 30 tablet, Rfl: 3    gabapentin (NEURONTIN) 100 MG capsule, Take 1 capsule (100 mg total) by mouth 3 (three) times daily., Disp: 90 capsule, Rfl: 6    ibuprofen (ADVIL,MOTRIN) 800 MG tablet, Take 1 tablet (800 mg total) by mouth 2 (two) times daily as needed for Pain., Disp: 90 tablet, Rfl: 0    methocarbamoL (ROBAXIN) 500 MG Tab, Take 1 tablet (500 mg total) by mouth 3  (three) times daily as needed (pain)., Disp: 45 tablet, Rfl: 0    semaglutide, weight loss, (WEGOVY) 0.5 mg/0.5 mL PnIj, Inject 0.5 mg into the skin every 7 days., Disp: 2 mL, Rfl: 0    [START ON 7/12/2024] semaglutide, weight loss, (WEGOVY) 1 mg/0.5 mL PnIj, Inject 1 mg into the skin every 7 days., Disp: 2 mL, Rfl: 0    [START ON 8/12/2024] semaglutide, weight loss, (WEGOVY) 1.7 mg/0.75 mL PnIj, Inject 1.7 mg into the skin every 7 days., Disp: 12 mL, Rfl: 0    ALLERGIES:   Review of patient's allergies indicates:   Allergen Reactions    Adhesive      Causes skin tearing and redness         PHYSICAL EXAMINATION:  BP (!) 141/81   Pulse 87   Ht 5' (1.524 m)   Wt 102.2 kg (225 lb 5 oz)   LMP 06/11/2024 (Exact Date)   BMI 44.00 kg/m²   Vitals signs and nursing note have been reviewed.  General: In no acute distress, well developed, well nourished, no diaphoresis  Eyes: EOM full and smooth, no eye redness or discharge  HENT: normocephalic and atraumatic, neck supple, trachea midline, no nasal discharge, no external ear redness or discharge  Cardiovascular: 2+ and symmetric DP pulses bilaterally, no LE edema  Lungs: respirations non-labored, no conversational dyspnea   Abd: non-distended, no rigidity  MSK: no amputation or deformity, no swelling of extremities  Neuro: AAOx3, CN2-12 grossly intact  Skin: No rashes, warm and dry  Psychiatric: cooperative, pleasant, mood and affect appropriate for age    MUSCULOSKELETAL EXAM:    BILATERAL KNEE EXAMINATION   Affected side is compared to contralateral knee     Observation:  No edema, erythema, ecchymosis noted. Small suprapatellar effusion bilaterally  No muscle atrophy of the thighs and calves noted.  No obvious bony deformities noted.   No genu valgus/varum noted. No recurvatum noted.      Tenderness:  Patella - + bilaterally   Lateral joint line - + bilaterally  Quad tendon - none   Medial joint line - + bilaterally  Patellar tendon - none  Medial plica - none  Tibial  tubercle - none   Lateral plica - none  Pes anserine - + b/l   MCL prox - none  Distal ITB - none   MCL distal - none  MFC - none    LCL prox - none  LFC - none    LCL distal - none  Tibia - none    Fibula - none    No obvious bursae, plicae, popliteal cysts, or tendon derangement palpated.          ROM:   Active extension to 0° on left without hyperextension, lag, or patellar J sign.   Active extension to 0° on right without hyperextension, lag, or patellar J sign.   + crepitus with extension bilaterally  Active flexion to 120° on left and 120° on right.    Strength: (bilaterally)  Knee Flexion - 5/5 on left and 5/5 on right  Knee Extension - 5/5 on left and 5/5 on right  Hip Flexion - 5/5 on left and 5/5 on right  Hip Extension - 5/5 on left and 5/5 on right  Ankle dorsiflexion - 5/5 on left and 5/5 on right  Ankle Plantarflexion - 5/5 on left and 5/5 on right    Patellofemoral Exam:  Patellar ballottement - negative  Bulge sign - negative  Patellar grind - negative on left, positive on right  No patellar laxity with medial and lateral translation   No apprehension with medial and lateral patellar translation.     Ligament Testing:  Lachman's test - negative  No laxity with varus testing at 0 and 30 degrees.  No laxity with valgus testing at 0 and 30 degrees.    Neurovascular Examination:   Antalgic gait  Sensation intact to light touch in the obturator, lateral/intermediate/medial/posterior femoral cutaneous, saphenous, and common peroneal nerves bilaterally.  Pulses intact at the DP and PT arteries bilaterally.    Capillary refill intact <2 seconds in all toes bilaterally.    IMAGIN. X-ray obtained on 2022 due to bilateral knee pain  2. X-ray images were reviewed personally by me and then directly with patient.  3. FINDINGS: X-ray images obtained demonstrate tricompartmental degenerative changes, most pronounced in the medial compartment bilaterally.  Kellgren and Clay grade 3  4. IMPRESSION: As  "above.     1. X-ray obtained 04/21/2024 due to bilateral knee pain   2. X-ray images were reviewed personally by me and then directly with patient.  3. FINDINGS: X-ray images obtained demonstrate nonspecific suprapatellar joint effusion on the right and possible suprapatellar joint effusion on the left, tricompartmental degenerative changes, most pronounced at the medial compartment and progressed from 2022. Kellgren and Clay grade 3  4. IMPRESSION: As above.       PROCEDURE:  Large Joint Aspiration  Knee joint, bilateral  Performed by: PEYMAN MCGUIRE  Authorized by: PEYMAN MCGUIRE  Consent Done?: Yes (Verbal)  Indications: Pain and joint effusion  Site marked: The procedure site was marked   Timeout: Prior to procedure the correct patient, procedure, and site was verified   Location: Knee joint, bilateral  Prep: Patient was prepped with Chlorhexidine and alcohol.  Ultrasound Guidance for needle placement: yes    Procedure RIGHT: Ethyl Chloride spray was used prior to skin puncture. After cold spray was applied, 2-4 cc's of 0.2% ropivacaine was injected into the skin and superficial tissue at the injection site using a 25G, 1.5" needle to form an anesthetic tunnel and ensure proper placement of the needle into the joint space. After local anesthetic was applied an 18G, 1.5 needle was used to enter the knee joint capsule under US guidance. 8 cc of clear synovial fluid was aspirated.    Procedure LEFT: Ethyl Chloride spray was used prior to skin puncture. After cold spray was applied, 2-4 cc's of 0.2% ropivacaine was injected into the skin and superficial tissue at the injection site using a 25G, 1.5" needle to form an anesthetic tunnel and ensure proper placement of the needle into the joint space. After local anesthetic was applied an 18G, 1.5 needle was used to enter the knee joint capsule under US guidance. 5 cc of clear synovial fluid was aspirated.     Approach: superiorlateral  Patient tolerance: " Patient tolerated the procedure well with no immediate complications. Improvement noted after aspiration. Patient was wrapped following bandaging.    Ultrasound guidance was used for needle localization with SonoSite Edge 2, 15-6 MHz (L)probe(s). Images were saved and stored for documentation. Short and long axis images of the anterior knee were taken prior to injection confirming presence of joint effusion. The knee joint well visualized. Dynamic visualization of the needles was continuous throughout the procedure and maintained good position and correct needle placement.      ASSESSMENT:      ICD-10-CM ICD-9-CM   1. Chronic pain of both knees  M25.561 719.46    M25.562 338.29    G89.29    2. Primary osteoarthritis of both knees  M17.0 715.16       PLAN:  1-2.  Chronic bilateral knee pain/osteoarthritis - improved, recent return of symptoms    - Nereida is following up today for her chronic bilateral knee pain which has been slowly worsening over several years.  She has also been appreciating increased swelling and instability which worsens with walking and long periods of standing at work.    - She completed bilateral knee Orthovisc series 03/22/2024 and denies appreciating improvement following. She also saw Imer Hanson PA-C 04/02/2024 who performed bilateral knee aspirations. Her most recent bilateral knee CSI was at visit 04/23/2024 and she admits to appreciating 7 weeks of great relief following.     - We reviewed the natural history of osteoarthritis and a multipronged treatment approach. We reviewed the importance of addressing three different aspects to best manage this condition. Controlling the intra-articular immune reaction through medications and/or injections, improving local stability through bracing and/or injection, and improving functional stability through hip, core, and ankle strength, stability and mobility which may benefit from formal physical therapy.    - After discussing options, she  elects to proceed with repeat ultrasound guided bilateral knee aspiration. See above for procedure detail.     - Continue with Diclofenac 75 mg twice daily PRN to help with pain and swelling.     - Continue with bilateral medial hinged knee braces during activity.       Future planning includes - possible bilateral knee Zilretta injections vs repeat standard CSIs, possible [facility]a referral to Dr. Zhang    All questions were answered to the best of my ability and all concerns were addressed at this time.    Follow up for above.       Total time spent face-to face with patient counseling or coordinating care including prognosis, differential diagnosis, risks and benefits of treatment, instructions, compliance risk reductions as well as non-face-to-face time personally spent reviewing medial record, medical documentation, and coordination of care.     EST MINUTES X   [id number] 10-19    09238 20-29    08944 30-39 31   32607 40-54    NEW     03462 15-29    06861 30-44    18724 45-59    06205 60-74    PHONE      5-10    90825 11-20    [id number] 21-30

## 2024-07-01 ENCOUNTER — PATIENT MESSAGE (OUTPATIENT)
Dept: BARIATRICS | Facility: CLINIC | Age: 49
End: 2024-07-01
Payer: COMMERCIAL

## 2024-07-25 ENCOUNTER — PATIENT MESSAGE (OUTPATIENT)
Dept: SPORTS MEDICINE | Facility: CLINIC | Age: 49
End: 2024-07-25
Payer: COMMERCIAL

## 2024-07-25 DIAGNOSIS — M25.561 CHRONIC PAIN OF BOTH KNEES: ICD-10-CM

## 2024-07-25 DIAGNOSIS — G89.29 CHRONIC PAIN OF BOTH KNEES: ICD-10-CM

## 2024-07-25 DIAGNOSIS — M25.562 CHRONIC PAIN OF BOTH KNEES: ICD-10-CM

## 2024-07-25 DIAGNOSIS — M17.0 PRIMARY OSTEOARTHRITIS OF BOTH KNEES: ICD-10-CM

## 2024-07-25 NOTE — PROGRESS NOTES
"Subjective:     CC: bilateral knee pain/osteoarthritis    Nereida is a 49 y.o. female coming in today for their Zilretta injection to the bilateral knee. The patient reports their pain is 7/10 today.     Objective:     VITAL SIGNS: /77   Pulse 74   Ht 5' (1.524 m)   Wt 101.2 kg (223 lb)   BMI 43.55 kg/m²      PROCEDURE:  Large Joint Aspiration/Injection  Knee joint, bilateral  Performed by: PEYMAN MCGUIRE  Authorized by: PEYMAN MCGUIRE  Consent Done?: Yes (Verbal)  Indications: Pain and joint effusion  Site marked: The procedure site was marked   Timeout: Prior to procedure the correct patient, procedure, and site was verified   Location: Knee joint, bilatera  Prep: Patient was prepped with Chlorhexidine and alcohol.  Ultrasound Guidance for needle placement: yes    Procedure RIGHT: The patient was prepped aseptically with alcohol and chlorhexidine. Ethyl Chloride spray was used prior to skin puncture to help numb the superficial skin. After cold spray was applied, 2-4 cc's of 0.2% ropivacaine was injected into the skin and superficial tissue at the injection site using a 25G, 1.5" needle to form an anesthetic tunnel and ensure proper needle placement into the knee joint space. Cold spray was applied again and an 18G X 1.5" needle was used to enter the joint space and 13 cc of clear synovial fluid was aspirated. Using a hemostat, the syringe was exchanged with the Zilretta syringe, and a 5cc mixture of 32 mg/ml triamcinolone acetonide extended release injectable suspension was injected into the knee joint. was injected into the knee joint. The patient was in the supine position during the duration of this procedure and the injection approach was from the superolateral aspect.     Procedure LEFT: The patient was prepped aseptically with alcohol and chlorhexidine. Ethyl Chloride spray was used prior to skin puncture to help numb the superficial skin. After cold spray was applied, 2-4 cc's of 0.2% " "ropivacaine was injected into the skin and superficial tissue at the injection site using a 25G, 1.5" needle to form an anesthetic tunnel and ensure proper needle placement into the knee joint space. Cold spray was applied again and an 18G X 1.5" needle was used to enter the joint space and 10 cc of clear synovial fluid was aspirated. Using a hemostat, the syringe was exchanged with the Zilretta syringe, and a 5cc mixture of 32 mg/ml triamcinolone acetonide extended release injectable suspension was injected into the knee joint. was injected into the knee joint. The patient was in the supine position during the duration of this procedure and the injection approach was from the superolateral aspect.     Approach: superiorlateral  Medication: 32 mg triamcinolone acetonide extended-release injectable suspension  Patient tolerance: Patient tolerated the procedure well with no immediate complications.    Ultrasound guidance was used for needle localization with SonViveve Edge 2, 15-6 MHz (L) probe(s). Images were saved and stored for documentation. Short and long axis images of the anterior knee were taken prior to injection confirming presence of joint effusion. The knee joint well visualized. Dynamic visualization of the needles was continuous throughout the procedure and maintained good position and correct needle placement.    Zilretta: X2:  NDC: 78806-119-7  LOT: [id number]  EXP: Jul 2025      Assessment:      Encounter Diagnoses   Name Primary?    Chronic pain of both knees Yes    Primary osteoarthritis of both knees         Plan:     1. Zilretta injection of bilateral knee received today (see procedure note above)  2. Follow-up as needed  3. Patient agreeable to today's plan and all questions were answered      This note is dictated using the M*Modal Fluency Direct word recognition program. There are word recognition mistakes that are occasionally missed on review.     "

## 2024-07-26 ENCOUNTER — OFFICE VISIT (OUTPATIENT)
Dept: SPORTS MEDICINE | Facility: CLINIC | Age: 49
End: 2024-07-26
Payer: COMMERCIAL

## 2024-07-26 VITALS
HEART RATE: 74 BPM | SYSTOLIC BLOOD PRESSURE: 129 MMHG | HEIGHT: 60 IN | BODY MASS INDEX: 43.78 KG/M2 | WEIGHT: 223 LBS | DIASTOLIC BLOOD PRESSURE: 77 MMHG

## 2024-07-26 DIAGNOSIS — M25.562 CHRONIC PAIN OF BOTH KNEES: Primary | ICD-10-CM

## 2024-07-26 DIAGNOSIS — M17.0 PRIMARY OSTEOARTHRITIS OF BOTH KNEES: ICD-10-CM

## 2024-07-26 DIAGNOSIS — G89.29 CHRONIC PAIN OF BOTH KNEES: Primary | ICD-10-CM

## 2024-07-26 DIAGNOSIS — M25.561 CHRONIC PAIN OF BOTH KNEES: Primary | ICD-10-CM

## 2024-07-26 PROCEDURE — 99999 PR PBB SHADOW E&M-EST. PATIENT-LVL III: CPT | Mod: PBBFAC,,, | Performed by: ORTHOPAEDIC SURGERY

## 2024-07-26 RX ORDER — IBUPROFEN 800 MG/1
800 TABLET ORAL 2 TIMES DAILY PRN
Qty: 90 TABLET | Refills: 0 | Status: SHIPPED | OUTPATIENT
Start: 2024-07-26

## 2024-08-29 ENCOUNTER — PATIENT MESSAGE (OUTPATIENT)
Dept: RHEUMATOLOGY | Facility: CLINIC | Age: 49
End: 2024-08-29
Payer: COMMERCIAL

## 2024-09-06 ENCOUNTER — PATIENT MESSAGE (OUTPATIENT)
Dept: SPORTS MEDICINE | Facility: CLINIC | Age: 49
End: 2024-09-06
Payer: COMMERCIAL

## 2024-09-07 DIAGNOSIS — M25.561 CHRONIC PAIN OF BOTH KNEES: ICD-10-CM

## 2024-09-07 DIAGNOSIS — M17.0 PRIMARY OSTEOARTHRITIS OF BOTH KNEES: ICD-10-CM

## 2024-09-07 DIAGNOSIS — M25.562 CHRONIC PAIN OF BOTH KNEES: ICD-10-CM

## 2024-09-07 DIAGNOSIS — G89.29 CHRONIC PAIN OF BOTH KNEES: ICD-10-CM

## 2024-09-07 RX ORDER — IBUPROFEN 800 MG/1
800 TABLET ORAL 2 TIMES DAILY PRN
Qty: 90 TABLET | Refills: 0 | Status: SHIPPED | OUTPATIENT
Start: 2024-09-07

## 2024-09-30 ENCOUNTER — OFFICE VISIT (OUTPATIENT)
Dept: SPORTS MEDICINE | Facility: CLINIC | Age: 49
End: 2024-09-30
Payer: COMMERCIAL

## 2024-09-30 VITALS
HEART RATE: 69 BPM | HEIGHT: 60 IN | SYSTOLIC BLOOD PRESSURE: 145 MMHG | WEIGHT: 221.44 LBS | BODY MASS INDEX: 43.47 KG/M2 | DIASTOLIC BLOOD PRESSURE: 74 MMHG

## 2024-09-30 DIAGNOSIS — M25.562 CHRONIC PAIN OF BOTH KNEES: Primary | ICD-10-CM

## 2024-09-30 DIAGNOSIS — G89.29 CHRONIC PAIN OF BOTH KNEES: Primary | ICD-10-CM

## 2024-09-30 DIAGNOSIS — M25.561 CHRONIC PAIN OF BOTH KNEES: Primary | ICD-10-CM

## 2024-09-30 DIAGNOSIS — M17.0 PRIMARY OSTEOARTHRITIS OF BOTH KNEES: ICD-10-CM

## 2024-09-30 PROCEDURE — 20611 DRAIN/INJ JOINT/BURSA W/US: CPT | Mod: 50,S$GLB,, | Performed by: ORTHOPAEDIC SURGERY

## 2024-09-30 PROCEDURE — 99214 OFFICE O/P EST MOD 30 MIN: CPT | Mod: 25,S$GLB,, | Performed by: ORTHOPAEDIC SURGERY

## 2024-09-30 PROCEDURE — 99999 PR PBB SHADOW E&M-EST. PATIENT-LVL III: CPT | Mod: PBBFAC,,, | Performed by: ORTHOPAEDIC SURGERY

## 2024-09-30 RX ORDER — KETOROLAC TROMETHAMINE 30 MG/ML
30 INJECTION, SOLUTION INTRAMUSCULAR; INTRAVENOUS
Status: COMPLETED | OUTPATIENT
Start: 2024-09-30 | End: 2024-09-30

## 2024-09-30 RX ORDER — TRIAMCINOLONE ACETONIDE 40 MG/ML
40 INJECTION, SUSPENSION INTRA-ARTICULAR; INTRAMUSCULAR
Status: CANCELLED | OUTPATIENT
Start: 2024-09-30 | End: 2024-09-30

## 2024-09-30 RX ADMIN — KETOROLAC TROMETHAMINE 30 MG: 30 INJECTION, SOLUTION INTRAMUSCULAR; INTRAVENOUS at 04:09

## 2024-09-30 NOTE — PROGRESS NOTES
CC: bilateral knee pain    Nereida is here today for follow up evaluation of her bilateral knee pain. Patient reports her pain is 7/10 today. She most recently had bilateral intra-articular knee Zilretta injections at visit 07/26/2024 and admits to appreciating improvement in her pain following. She states she is not feeling as swollen today. She also admits to left ankle swelling over the past 1 week without mechanism of injury.    Recall from visit on 06/24/2024  Nereida is here today for follow up evaluation of her bilateral knee pain. Patient reports her pain is 6/10 today. Her most recent bilateral knee CSI was at visit 04/23/2024 and she admits to appreciating excellent relief for 7 weeks following. She reports over the past week she has noticed return of pain and tightness with the sensation of swelling in both knees. Left is currently slightly worse than right. Reports crepitus but no other mechanical symptoms. Denies changes in symptomatology. Continues to ambulate ~5 miles a day w/o assistive devices at her job. Wears knee braces at times but not constantly as they are uncomfortable and left one falls off intermittently. She does report that she has been taking the Diclofenac less frequently over this time as well. She is interested in repeating aspiration today if possible. No other/new MSK complaints reported today.     Recall from visit on 04/23/2024  Nereida is here today for follow up evaluation of her bilateral knee pain. Patient reports her pain is 10/10 today. It is the same in character and location as noted prior. She completed bilateral knee Orthovisc series 03/22/2024 and denies appreciating improvement following. She also saw Imer Hanson PA-C 04/02/2024 who performed bilateral knee aspirations. Due to severe pain, she sought care from the ED 04/21/2024 where imaging was updated and she was prescribed Norco 5.       PAST MEDICAL HISTORY:   Past Medical History:   Diagnosis Date    Allergy      Bilateral chronic knee pain     Morbid obesity with BMI of 45.0-49.9, adult     Osteoarthritis of both knees        PAST SURGICAL HISTORY:   Past Surgical History:   Procedure Laterality Date    ARTHROSCOPIC CHONDROPLASTY OF KNEE JOINT Right 2020    Procedure: ARTHROSCOPY, KNEE, WITH CHONDROPLASTY;  Surgeon: Aiden Clarke DO;  Location: [facility] OR;  Service: Orthopedics;  Laterality: Right;     SECTION      KNEE ARTHROSCOPY W/ MENISCECTOMY Right 2020    Procedure: ARTHROSCOPY, KNEE, WITH MENISCECTOMY;  Surgeon: Aiden Clarke DO;  Location: [facility] OR;  Service: Orthopedics;  Laterality: Right;  Equipment: Scope; [employer] Truspar Meniscus Repair System; Bosler chondral drill set  Vendor: [employer]; [employer]  Table: Supine    KNEE ARTHROSCOPY W/ PLICA EXCISION Right 2020    Procedure: EXCISION, PLICA, KNEE, ARTHROSCOPIC;  Surgeon: Aiden Clarke DO;  Location: [facility] OR;  Service: Orthopedics;  Laterality: Right;       FAMILY HISTORY:   Family History   Problem Relation Name Age of Onset    Hypertension Mother      Alcohol abuse Father Yaya         none    Breast cancer Neg Hx      Ovarian cancer Neg Hx         SOCIAL HISTORY:   Social History     Socioeconomic History    Marital status: Single   Tobacco Use    Smoking status: Never    Smokeless tobacco: Never   Substance and Sexual Activity    Alcohol use: No    Drug use: No    Sexual activity: Yes     Partners: Male     Birth control/protection: None     Social Drivers of Health     Financial Resource Strain: Low Risk  (2023)    Overall Financial Resource Strain (CARDIA)     Difficulty of Paying Living Expenses: Not hard at all   Food Insecurity: No Food Insecurity (2023)    Hunger Vital Sign     Worried About Running Out of Food in the Last Year: Never true     Ran Out of Food in the Last Year: Never true   Transportation Needs: No Transportation Needs (2023)    PRAPARE - Transportation     Lack of Transportation (Medical): No      Lack of Transportation (Non-Medical): No   Physical Activity: Sufficiently Active (12/11/2023)    Exercise Vital Sign     Days of Exercise per Week: 5 days     Minutes of Exercise per Session: 30 min   Stress: No Stress Concern Present (12/11/2023)    [employer] - Occupational Stress Questionnaire     Feeling of Stress : Not at all   Housing Stability: Unknown (12/11/2023)    Housing Stability Vital Sign     Unable to Pay for Housing in the Last Year: No     Unstable Housing in the Last Year: No       MEDICATIONS:     Current Outpatient Medications:     diclofenac (VOLTAREN) 75 MG EC tablet, Take 1 tablet (75 mg total) by mouth 2 (two) times daily., Disp: 60 tablet, Rfl: 0    diclofenac sodium (VOLTAREN) 1 % Gel, Apply 2 g topically 4 (four) times daily., Disp: 20 g, Rfl: 2    furosemide (LASIX) 40 MG tablet, Take 1 tablet (40 mg total) by mouth once daily., Disp: 30 tablet, Rfl: 3    gabapentin (NEURONTIN) 100 MG capsule, Take 1 capsule (100 mg total) by mouth 3 (three) times daily., Disp: 90 capsule, Rfl: 6    ibuprofen (ADVIL,MOTRIN) 800 MG tablet, Take 1 tablet (800 mg total) by mouth 2 (two) times daily as needed for Pain., Disp: 90 tablet, Rfl: 0    methocarbamoL (ROBAXIN) 500 MG Tab, Take 1 tablet (500 mg total) by mouth 3 (three) times daily as needed (pain)., Disp: 45 tablet, Rfl: 0    semaglutide, weight loss, (WEGOVY) 0.5 mg/0.5 mL PnIj, Inject 0.5 mg into the skin every 7 days., Disp: 2 mL, Rfl: 0    semaglutide, weight loss, (WEGOVY) 1 mg/0.5 mL PnIj, Inject 1 mg into the skin every 7 days., Disp: 2 mL, Rfl: 0    semaglutide, weight loss, (WEGOVY) 1.7 mg/0.75 mL PnIj, Inject 1.7 mg into the skin every 7 days., Disp: 12 mL, Rfl: 0  No current facility-administered medications for this visit.    ALLERGIES:   Review of patient's allergies indicates:   Allergen Reactions    Adhesive      Causes skin tearing and redness         PHYSICAL EXAMINATION:  BP (!) 145/74   Pulse 69    Ht 5' (1.524 m)   Wt 100.4 kg (221 lb 7.2 oz)   BMI 43.25 kg/m²   Vitals signs and nursing note have been reviewed.  General: In no acute distress, well developed, well nourished, no diaphoresis  Eyes: EOM full and smooth, no eye redness or discharge  HENT: normocephalic and atraumatic, neck supple, trachea midline, no nasal discharge, no external ear redness or discharge  Cardiovascular: 2+ and symmetric DP pulses bilaterally, no LE edema  Lungs: respirations non-labored, no conversational dyspnea   Abd: non-distended, no rigidity  MSK: no amputation or deformity, no swelling of extremities  Neuro: AAOx3, CN2-12 grossly intact  Skin: No rashes, warm and dry  Psychiatric: cooperative, pleasant, mood and affect appropriate for age    MUSCULOSKELETAL EXAM:    BILATERAL KNEE EXAMINATION   Affected side is compared to contralateral knee     Observation:  No edema, erythema, ecchymosis noted. Small suprapatellar effusion on right  No muscle atrophy of the thighs and calves noted.  No obvious bony deformities noted.   No genu valgus/varum noted. No recurvatum noted.      Tenderness:  Patella - + bilaterally   Lateral joint line - + bilaterally  Quad tendon - none   Medial joint line - + bilaterally  Patellar tendon - none  Medial plica - none  Tibial tubercle - none   Lateral plica - none  Pes anserine - + b/l   MCL prox - none  Distal ITB - none   MCL distal - none  MFC - none    LCL prox - none  LFC - none    LCL distal - none  Tibia - none    Fibula - none    No obvious bursae, plicae, popliteal cysts, or tendon derangement palpated.          ROM:   Active extension to 0° on left without hyperextension, lag, or patellar J sign.   Active extension to 0° on right without hyperextension, lag, or patellar J sign.   + crepitus with extension bilaterally  Active flexion to 120° on left and 120° on right.    Strength: (bilaterally)  Knee Flexion - 5/5 on left and 5/5 on right  Knee Extension - 5/5 on left and 5/5 on  right  Hip Flexion - 5/5 on left and 5/5 on right  Hip Extension - 5/5 on left and 5/5 on right  Ankle dorsiflexion - 5/5 on left and 5/5 on right  Ankle Plantarflexion - 5/5 on left and 5/5 on right    Patellofemoral Exam:  Patellar ballottement - positive on right  Bulge sign - negative  Patellar grind - negative on left, positive on right  No patellar laxity with medial and lateral translation   No apprehension with medial and lateral patellar translation.     Ligament Testing:  Lachman's test - negative  No laxity with varus testing at 0 and 30 degrees.  No laxity with valgus testing at 0 and 30 degrees.    Neurovascular Examination:   Antalgic gait  Sensation intact to light touch in the obturator, lateral/intermediate/medial/posterior femoral cutaneous, saphenous, and common peroneal nerves bilaterally.  Pulses intact at the DP and PT arteries bilaterally.    Capillary refill intact <2 seconds in all toes bilaterally.    IMAGIN. X-ray obtained on 2022 due to bilateral knee pain  2. X-ray images were reviewed personally by me and then directly with patient.  3. FINDINGS: X-ray images obtained demonstrate tricompartmental degenerative changes, most pronounced in the medial compartment bilaterally.  Kellgren and Clay grade 3  4. IMPRESSION: As above.     1. X-ray obtained 2024 due to bilateral knee pain   2. X-ray images were reviewed personally by me and then directly with patient.  3. FINDINGS: X-ray images obtained demonstrate nonspecific suprapatellar joint effusion on the right and possible suprapatellar joint effusion on the left, tricompartmental degenerative changes, most pronounced at the medial compartment and progressed from . Kellgren and Clay grade 3  4. IMPRESSION: As above.     PROCEDURE:  Large Joint Aspiration/Injection  Knee joint, bilateral  Performed by: PEYMAN MCGUIRE  Authorized by: PEYMAN MCGUIRE  Consent Done?: Yes (Verbal)  Indications: Pain and joint  "effusion  Site marked: The procedure site was marked   Timeout: Prior to procedure the correct patient, procedure, and site was verified   Location: Knee joint, bilateral  Prep: Patient was prepped with Chlorhexidine and alcohol.  Ultrasound Guidance for needle placement: yes    Procedure RIGHT: Ethyl Chloride spray was used prior to skin puncture. After cold spray was applied, 2-4 cc's of 0.2% ropivacaine was injected into the skin and superficial tissue at the injection site using a 25G, 1.5" needle to form an anesthetic tunnel and ensure proper placement of the needle into the joint space. After local anesthetic was applied an 18G, 1.5 needle was used to enter the knee joint capsule under US guidance. 14 cc of clear synovial fluid was aspirated. Following aspiration, a 3 cc mixture of 1 cc of 30 mg/ml ketorolac tromethamine and 2 cc of 0.2% ropivacaine were injected into the knee joint.     Procedure LEFT: Ethyl Chloride spray was used prior to skin puncture. After cold spray was applied, a 22 G x 2.5" needle was used to enter the joint space and a 3 cc mixture of 1 cc of 30 mg/ml ketorolac tromethamine and 2 cc of 0.2% ropivacaine were injected into the knee joint.     Approach: superiorlateral  Medications: 30 mg/ml ketorolac tromethamine 30 mg/mL  Patient tolerance: Patient tolerated the procedure well with no immediate complications. Improvement noted after aspiration. Patient was wrapped following bandaging.    Ultrasound guidance was used for needle localization with SonoSite Edge 2, 15-6 MHz (L)probe(s). Images were saved and stored for documentation. Short and long axis images of the anterior knee were taken prior to injection confirming presence of joint effusion. The knee joint well visualized. Dynamic visualization of the needles was continuous throughout the procedure and maintained good position and correct needle placement.    ketorolac tromethamine: X2:  NDC: 3507-5591-33  LOT: [id number]  EXP: " 2025-10      ASSESSMENT:      ICD-10-CM ICD-9-CM   1. Chronic pain of both knees  M25.561 719.46    M25.562 338.29    G89.29    2. Primary osteoarthritis of both knees  M17.0 715.16         PLAN:  1-2.  Chronic bilateral knee pain/osteoarthritis - improved, recent return of symptoms    - Nereida is following up today for her chronic bilateral knee pain which has been slowly worsening over several years.  She has also been appreciating increased swelling and instability which worsens with walking and long periods of standing at work.    - Her most recent bilateral knee Zilretta injections were at visit 07/26/2024 and she admits to appreciating improvement following. She feels as though her knee swelling has not been as severe.     - We reviewed the natural history of osteoarthritis and a multipronged treatment approach. We reviewed the importance of addressing three different aspects to best manage this condition. Controlling the intra-articular immune reaction through medications and/or injections, improving local stability through bracing and/or injection, and improving functional stability through hip, core, and ankle strength, stability and mobility which may benefit from formal physical therapy.    - After discussing options she elects to proceed with ultrasound guided intra-articular knee Toradol injections. See above for procedure detail.     - Continue with bilateral medial hinged knee braces during activity.       Future planning includes - bilateral intra-articular knee Orthovisc series    All questions were answered to the best of my ability and all concerns were addressed at this time.    Follow up for above.

## 2024-10-08 ENCOUNTER — PATIENT MESSAGE (OUTPATIENT)
Dept: SPORTS MEDICINE | Facility: CLINIC | Age: 49
End: 2024-10-08
Payer: COMMERCIAL

## 2024-10-10 ENCOUNTER — PATIENT MESSAGE (OUTPATIENT)
Dept: SPORTS MEDICINE | Facility: CLINIC | Age: 49
End: 2024-10-10
Payer: COMMERCIAL

## 2024-10-15 ENCOUNTER — OFFICE VISIT (OUTPATIENT)
Dept: PODIATRY | Facility: CLINIC | Age: 49
End: 2024-10-15
Payer: COMMERCIAL

## 2024-10-15 VITALS
SYSTOLIC BLOOD PRESSURE: 149 MMHG | HEIGHT: 60 IN | DIASTOLIC BLOOD PRESSURE: 80 MMHG | WEIGHT: 221.31 LBS | HEART RATE: 80 BPM | BODY MASS INDEX: 43.45 KG/M2

## 2024-10-15 DIAGNOSIS — M25.473 ANKLE SWELLING, UNSPECIFIED LATERALITY: Primary | ICD-10-CM

## 2024-10-15 DIAGNOSIS — M25.579 ANKLE PAIN, UNSPECIFIED CHRONICITY, UNSPECIFIED LATERALITY: ICD-10-CM

## 2024-10-15 DIAGNOSIS — M24.573 EQUINUS CONTRACTURE OF ANKLE: ICD-10-CM

## 2024-10-15 DIAGNOSIS — M65.90 SYNOVITIS: ICD-10-CM

## 2024-10-15 DIAGNOSIS — M24.273 LIGAMENTOUS LAXITY OF ANKLE, UNSPECIFIED LATERALITY: ICD-10-CM

## 2024-10-15 PROCEDURE — 29540 STRAPPING ANKLE &/FOOT: CPT | Mod: 50,GC,S$GLB, | Performed by: PODIATRIST

## 2024-10-15 PROCEDURE — 99214 OFFICE O/P EST MOD 30 MIN: CPT | Mod: 25,GC,S$GLB, | Performed by: PODIATRIST

## 2024-10-15 PROCEDURE — 99999 PR PBB SHADOW E&M-EST. PATIENT-LVL III: CPT | Mod: PBBFAC,,, | Performed by: PODIATRIST

## 2024-10-15 RX ORDER — LIDOCAINE AND PRILOCAINE 25; 25 MG/G; MG/G
CREAM TOPICAL
Qty: 30 G | Refills: 5 | Status: SHIPPED | OUTPATIENT
Start: 2024-10-15

## 2024-10-15 RX ORDER — MELOXICAM 15 MG/1
15 TABLET ORAL DAILY
Qty: 30 TABLET | Refills: 3 | Status: SHIPPED | OUTPATIENT
Start: 2024-10-15

## 2024-10-15 NOTE — PROGRESS NOTES
"Subjective:     Patient ID: Nereida Narayanan is a 49 y.o. female.    Chief Complaint: Foot Swelling    49-year-old female who presents today for bilateral lateral ankle swelling.  Per patient this has been going on for 6 months now.  Patient of note says she used to take Lasix but has not taken it for about a year now.  Patient moved to [place] from [place] and has not found a PCP to follow up with.  Admits to mild pain to her lateral left ankles but is most concerned about swelling "because it is ugly".  Patient works at the [employer].  Denies any current fevers chills nausea or vomiting.  Denies any history of any known trauma to her feet or ankles.  Patient has no other pedal complaints at this time.    Review of Systems   Constitutional: Negative.   HENT: Negative.     Eyes: Negative.    Cardiovascular:  Positive for leg swelling.   Respiratory: Negative.     Endocrine: Negative.    Hematologic/Lymphatic: Negative.    Skin: Negative.    Musculoskeletal:  Positive for myalgias.   Gastrointestinal: Negative.    Genitourinary: Negative.    Neurological: Negative.    Psychiatric/Behavioral: Negative.     Allergic/Immunologic: Negative.         Objective:     Physical Exam  Constitutional:       General: She is not in acute distress.  HENT:      Head: Normocephalic.      Right Ear: External ear normal.      Left Ear: External ear normal.      Nose: Nose normal.      Mouth/Throat:      Mouth: Mucous membranes are moist.   Eyes:      Extraocular Movements: Extraocular movements intact.   Cardiovascular:      Pulses: Normal pulses.           Dorsalis pedis pulses are 2+ on the right side and 2+ on the left side.        Posterior tibial pulses are 2+ on the right side and 2+ on the left side.   Pulmonary:      Effort: Pulmonary effort is normal.   Abdominal:      Tenderness: There is no abdominal tenderness.   Musculoskeletal:         General: Tenderness present.      Cervical back: Normal range of " motion.      Comments: Tenderness noted to the sinus tarsi area bilaterally.  Paint upon plantar flexion of feet and ankle bilaterally.  Laxity noted bilaterally to lateral ankle ligaments      Limited dorsiflexion noted of ankle with knee flexed and extended.  Less than 10° bilaterally   Skin:     Capillary Refill: Capillary refill takes less than 2 seconds.      Comments:   Bilateral lower leg and ankle swelling noted.  Varicose veins noted throughout lower extremity    Lateral ankles have swelling and synovitis noted.    Neurological:      General: No focal deficit present.      Mental Status: She is alert and oriented to person, place, and time.   Psychiatric:         Mood and Affect: Mood normal.         Behavior: Behavior normal.         Thought Content: Thought content normal.         Judgment: Judgment normal.       Biomechanical Exam:  Non weight bearing assessment:   Right (degrees) Left (degrees)   Malleolar position 15-20 15-20   Ankle DF (knee extended) 0 0   Ankle DF (knee flexed) 5 5   Heel Inversion 20 20   Heel Eversion 10 10   STJ Neutral Position 0 0   Forefoot to rearfoot (1-5) perp perp   Forefoot to Rearfoot (2-5) perp perp   First Ray Dorsiflextion 5mm 5mm   First ray plantarflextion 5mm 5mm   First ray Neutral Position 0mm 0mm   Hallux Dorsiflexion 65 65   Hallux plantarflexion 30 30      Musculoskeletal evaluation, Range of Motion    Normal Limited Excessive pain   Ankle DF  R/L     Ankle PF R/L      STJ supination  R/L     STJ pronation   R/L    Hallux DF R/L      Hallux PF R/L      Lesser digits DF R/L      Lesser Digits PF R/L        Muscle Strength   Right Left   Gastrocnemius 5 5   Soleus 5 5   Tib. Posterior 5 5   FDL 5 5   FHL 5 5   FDB 5 5   Tib Anterior 5 5   EDL 5 5   EHL 5 5   EDB 5 5   Peroneus Longus 5 5   Peroneus Brevis 5 5     Foot Morphology    Varus  R L    Valgus  R L      Forefoot X X           Rearfoot      X X            Sagittal Plane Planovalgus B/L    Transverse  Plane Forefoot Adducted B/L    Ankle Morphology Equinus B/L      Digital Assessments    Right  1 2 3 4 5    Left  1 2 3 4 5      Abducted                   Adducted                   Clawtoe                   Hammer toe                   Mallet toetoe                   Hallux IP extensus     Hallux IP abductus         Gait Analysis  Gait Pattern: Hyperpronation noted during stance phase of gait       Right Left   Angle of Gait 8 8   Base of Gait 5cm 5cm       Heel Position Right Left   Contact Pro Pro   Mid-stance Pro Pro   Propulsion sup sup   Swing sup sup       Heel off: WNL Both  Abductory twist?  No Both         Assessment:      Encounter Diagnoses   Name Primary?    Ankle swelling, unspecified laterality Yes    Equinus contracture of ankle     Synovitis     Ligamentous laxity of ankle, unspecified laterality     Ankle pain, unspecified chronicity, unspecified laterality      Plan:     Nereida was seen today for foot swelling.    Diagnoses and all orders for this visit:    Ankle swelling, unspecified laterality  -     X-Ray Ankle 2 View Bilateral; Future  -     X-Ray Foot Complete 3 view Bilateral; Future    Equinus contracture of ankle    Synovitis    Ligamentous laxity of ankle, unspecified laterality    Ankle pain, unspecified chronicity, unspecified laterality    Other orders  -     LIDOcaine-prilocaine (EMLA) cream; Apply topically as needed.      I counseled the patient on her conditions, their implications and medical management.    Recommend patient follow up with PCP, to rule out systemic causes of lower extremity swelling    Patient will stretch the tendo achilles complex three times daily as demonstrated in the office.  This will increase ankle dorsiflexion, offload the forefoot, minimize or eliminate early heel off.  Literature was dispensed illustrating proper stretching technique.     I applied a plantar rest strapping to the patient's left and right foot to offload symptomatic area, support the  arch, and relieve pain.     Patient will obtain over the counter arch supports and wear them in shoes whenever possible.  Athletic shoes intended for walking or running are usually best.    Meloxicam and EMLA cream prescribed    The patient was advised that NSAID-type medications have two very important potential side effects: gastrointestinal irritation including hemorrhage and renal injuries. He was asked to take the medication with food and to stop if he experiences any GI upset. I asked him to call for vomiting, abdominal pain or black/bloody stools. The patient expresses understanding of these issues and questions were answered.     Discussed conservative treatment with shoes of adequate dimensions, material, and style to alleviate symptoms and delay or prevent surgical intervention.      Meloxicam-symptoms, OTC inserts-evenly distribute pressure and plantar foot, support foot, minimize need for midfoot compensation for ankle equinus.     RTC KAREN Ambrose DPM PGY-3  Podiatric Medicine & Surgery  [facility]  Secure Chat Preferred  Mobile: [phone]  Pager: [phone]

## 2024-10-16 ENCOUNTER — TELEPHONE (OUTPATIENT)
Dept: BARIATRICS | Facility: CLINIC | Age: 49
End: 2024-10-16
Payer: COMMERCIAL

## 2024-10-16 NOTE — TELEPHONE ENCOUNTER
----- Message from Jana sent at 10/16/2024  3:55 PM CDT -----  Contact: [phone]  MARILUZ JACKSON calling regarding Appointment Access  (message) Pt asking for a call back to reschedule her her appt that was schedule for today at 4:20 that she had to cancel

## 2024-10-16 NOTE — TELEPHONE ENCOUNTER
"----- Message from Bridget sent at 10/16/2024  4:11 PM CDT -----  Regarding: pt advice  Contact: [phone]  .Name Of Caller: Self     Contact Preference?:[phone]     What is the nature of the call?: Returning call  missed call from alliosn Kim call      Additional Notes:  "Thank you for all that you do for our patients"  "

## 2024-10-17 ENCOUNTER — PATIENT MESSAGE (OUTPATIENT)
Dept: SPORTS MEDICINE | Facility: CLINIC | Age: 49
End: 2024-10-17
Payer: COMMERCIAL

## 2024-10-18 ENCOUNTER — PATIENT MESSAGE (OUTPATIENT)
Dept: BARIATRICS | Facility: CLINIC | Age: 49
End: 2024-10-18
Payer: COMMERCIAL

## 2024-10-29 ENCOUNTER — TELEPHONE (OUTPATIENT)
Dept: SPORTS MEDICINE | Facility: CLINIC | Age: 49
End: 2024-10-29
Payer: COMMERCIAL

## 2024-10-31 ENCOUNTER — OFFICE VISIT (OUTPATIENT)
Dept: SPORTS MEDICINE | Facility: CLINIC | Age: 49
End: 2024-10-31
Payer: COMMERCIAL

## 2024-10-31 VITALS
DIASTOLIC BLOOD PRESSURE: 79 MMHG | WEIGHT: 213.63 LBS | SYSTOLIC BLOOD PRESSURE: 131 MMHG | HEART RATE: 82 BPM | BODY MASS INDEX: 41.72 KG/M2

## 2024-10-31 DIAGNOSIS — M17.0 PRIMARY OSTEOARTHRITIS OF BOTH KNEES: Primary | ICD-10-CM

## 2024-10-31 PROCEDURE — 99213 OFFICE O/P EST LOW 20 MIN: CPT | Mod: 25,S$GLB,, | Performed by: PHYSICIAN ASSISTANT

## 2024-10-31 PROCEDURE — 99999 PR PBB SHADOW E&M-EST. PATIENT-LVL III: CPT | Mod: PBBFAC,,, | Performed by: PHYSICIAN ASSISTANT

## 2024-10-31 PROCEDURE — 20611 DRAIN/INJ JOINT/BURSA W/US: CPT | Mod: 50,S$GLB,, | Performed by: PHYSICIAN ASSISTANT

## 2024-10-31 RX ORDER — BUPIVACAINE HYDROCHLORIDE 2.5 MG/ML
2 INJECTION, SOLUTION INFILTRATION; PERINEURAL
Status: DISCONTINUED | OUTPATIENT
Start: 2024-10-31 | End: 2024-10-31 | Stop reason: HOSPADM

## 2024-10-31 RX ORDER — TRIAMCINOLONE ACETONIDE 40 MG/ML
40 INJECTION, SUSPENSION INTRA-ARTICULAR; INTRAMUSCULAR
Status: DISCONTINUED | OUTPATIENT
Start: 2024-10-31 | End: 2024-10-31 | Stop reason: HOSPADM

## 2024-10-31 RX ADMIN — TRIAMCINOLONE ACETONIDE 40 MG: 40 INJECTION, SUSPENSION INTRA-ARTICULAR; INTRAMUSCULAR at 02:10

## 2024-10-31 RX ADMIN — BUPIVACAINE HYDROCHLORIDE 2 ML: 2.5 INJECTION, SOLUTION INFILTRATION; PERINEURAL at 02:10

## 2024-11-08 ENCOUNTER — OFFICE VISIT (OUTPATIENT)
Dept: SPORTS MEDICINE | Facility: CLINIC | Age: 49
End: 2024-11-08
Payer: COMMERCIAL

## 2024-11-08 VITALS
SYSTOLIC BLOOD PRESSURE: 143 MMHG | BODY MASS INDEX: 41.44 KG/M2 | HEART RATE: 89 BPM | HEIGHT: 60 IN | DIASTOLIC BLOOD PRESSURE: 82 MMHG | WEIGHT: 211.06 LBS

## 2024-11-08 DIAGNOSIS — G89.29 CHRONIC PAIN OF BOTH KNEES: Primary | ICD-10-CM

## 2024-11-08 DIAGNOSIS — M17.0 PRIMARY OSTEOARTHRITIS OF BOTH KNEES: ICD-10-CM

## 2024-11-08 DIAGNOSIS — M25.561 CHRONIC PAIN OF BOTH KNEES: Primary | ICD-10-CM

## 2024-11-08 DIAGNOSIS — M25.562 CHRONIC PAIN OF BOTH KNEES: Primary | ICD-10-CM

## 2024-11-08 PROCEDURE — 99999 PR PBB SHADOW E&M-EST. PATIENT-LVL III: CPT | Mod: PBBFAC,,, | Performed by: ORTHOPAEDIC SURGERY

## 2024-11-08 NOTE — PROGRESS NOTES
"Subjective:     CC: bilateral knee pain/osteoarthritis    Nereida is a 49 y.o. female coming in today for their 1st Orthovisc injection to the bilateral knee. The patient reports their pain is 5/10 today.     Objective:     VITAL SIGNS: BP (!) 143/82   Pulse 89   Ht 5' (1.524 m)   Wt 95.8 kg (211 lb 1.5 oz)   BMI 41.23 kg/m²      Orthovisc Injection Procedure #1     A time out was performed, including verification of patient ID, procedure, site and side, availability of information and equipment, review of safety issues, and agreement with consent, the procedure site was marked.    Location: Knee joint, bilateral     Procedure RIGHT: The patient was prepped aseptically with alcohol and chlorhexidine. Ethyl Chloride spray was used prior to skin puncture to help numb the superficial skin. After cold spray was applied, 2-4 cc's of 0.2% ropivacaine was injected into the skin and superficial tissue at the injection site using a 25G, 1.5" needle to form an anesthetic tunnel and ensure proper needle placement into the knee joint space. Cold spray was applied again, and an 18G x 1.5" needle was used to enter the joint space and 6 cc of clear synovial fluid was aspirated. Using a hemostat, the syringe was exchanged with the Orthovisc syringe, and 2 cc of Orthovisc was injected into the knee joint. The patient was in the supine position during the duration of this procedure and the injection approach was from the superolateral aspect.     Procedure LEFT: The patient was prepped aseptically with alcohol and chlorhexidine. Ethyl Chloride spray was used prior to skin puncture to help numb the superficial skin. After cold spray was applied, 2-4 cc's of 0.2% ropivacaine was injected into the skin and superficial tissue at the injection site using a 22G, 1.5" needle to form an anesthetic tunnel and ensure proper needle placement into the knee joint space. Using a hemostat, the syringe was exchanged with the Orthovisc syringe, " "and 2 cc of Orthovisc was injected into the knee joint. The patient was in the supine position during the duration of this procedure and the injection approach was from the superolateral aspect.     Ultrasound guidance was used for needle localization with SonoSite Edge 2, 15-6 MHz (L)probe(s). Images were saved and stored for documentation. The knee joint was well visualized. Dynamic visualization of the 25G x 1.5" and 18G x 1.5" and 22G x 1.5 needles were continuous throughout the procedure and maintained good position and correct needle placement.        Patient tolerance: The patient tolerated the procedure well with no immediate complications. There were no adverse reactions to the medication. Patient was instructed to apply ice to the joint for up to 20 minutes at a time and avoid strenuous activities for 24-36 hours following the injection. Following that time, the patient can resume activities as prior to the injection.     The patient was reminded to call the clinic immediately for any adverse side effects as explained in clinic today.     Orthovisc: X2:  NDC: [id number]  Product Code: [id number]  Lot: [id number]  Exp: 2026-05-28      Assessment:      Encounter Diagnoses   Name Primary?    Primary osteoarthritis of both knees     Chronic pain of both knees Yes          Plan:     1. Orthovisc injection of bilateral knee received today (see procedure note above)  2. Follow-up in 1 week for 2nd injection of 3 injection series  3. Patient agreeable to today's plan and all questions were answered      This note is dictated using the M*Modal Fluency Direct word recognition program. There are word recognition mistakes that are occasionally missed on review.    "

## 2024-11-11 ENCOUNTER — TELEPHONE (OUTPATIENT)
Dept: SPORTS MEDICINE | Facility: CLINIC | Age: 49
End: 2024-11-11
Payer: COMMERCIAL

## 2024-11-11 NOTE — TELEPHONE ENCOUNTER
Spoke to the Pt about her appt and the need to move it.  She understood and was fine with the change in her appt. She confirmed the change.

## 2024-11-15 ENCOUNTER — OFFICE VISIT (OUTPATIENT)
Dept: SPORTS MEDICINE | Facility: CLINIC | Age: 49
End: 2024-11-15
Payer: COMMERCIAL

## 2024-11-15 VITALS
SYSTOLIC BLOOD PRESSURE: 132 MMHG | BODY MASS INDEX: 41.43 KG/M2 | HEIGHT: 60 IN | DIASTOLIC BLOOD PRESSURE: 80 MMHG | WEIGHT: 211 LBS | HEART RATE: 75 BPM

## 2024-11-15 DIAGNOSIS — M25.561 CHRONIC PAIN OF BOTH KNEES: Primary | ICD-10-CM

## 2024-11-15 DIAGNOSIS — M25.562 CHRONIC PAIN OF BOTH KNEES: Primary | ICD-10-CM

## 2024-11-15 DIAGNOSIS — M17.0 PRIMARY OSTEOARTHRITIS OF BOTH KNEES: ICD-10-CM

## 2024-11-15 DIAGNOSIS — G89.29 CHRONIC PAIN OF BOTH KNEES: Primary | ICD-10-CM

## 2024-11-15 PROCEDURE — 99999 PR PBB SHADOW E&M-EST. PATIENT-LVL III: CPT | Mod: PBBFAC,,, | Performed by: ORTHOPAEDIC SURGERY

## 2024-11-15 NOTE — PROGRESS NOTES
"Subjective:     CC: bilateral knee pain/osteoarthritis    Nereida is a 49 y.o. female coming in today for their 2nd Orthovisc injection to the bilateral knee. The patient reports their pain is 5/10 today.     Objective:     VITAL SIGNS: /80   Pulse 75   Ht 5' (1.524 m)   Wt 95.7 kg (211 lb)   BMI 41.21 kg/m²      Orthovisc Injection Procedure #2     A time out was performed, including verification of patient ID, procedure, site and side, availability of information and equipment, review of safety issues, and agreement with consent, the procedure site was marked.    Location: Knee joint, bilateral     Procedure: The patient was prepped aseptically with alcohol and chlorhexidine. Ethyl Chloride spray was used prior to skin puncture to help numb the superficial skin. After cold spray was applied, 2-4 cc's of 0.2% ropivacaine was injected into the skin and superficial tissue at the injection site using a 22G, 1.5" needle to form an anesthetic tunnel and ensure proper needle placement into the knee joint space. Using a hemostat, the syringe was exchanged with the Orthovisc syringe, and 2 cc of Euflexxa was injected into the knee joint. The patient was in the supine position during the duration of this procedure and the injection approach was from the superolateral aspect. The procedure was repeated on both knees.    Ultrasound guidance was used for needle localization with SonThermalin Diabeteste Edge 2, 15-6 MHz (L)probe(s). Images were saved and stored for documentation. The knee joint was well visualized. Dynamic visualization of the 22G x 1.5 needles was continuous throughout the procedure and maintained good position and correct needle placement.        Patient tolerance: The patient tolerated the procedure well with no immediate complications. There were no adverse reactions to the medication. Patient was instructed to apply ice to the joint for up to 20 minutes at a time and avoid strenuous activities for 24-36 hours " following the injection. Following that time, the patient can resume activities as prior to the injection.     The patient was reminded to call the clinic immediately for any adverse side effects as explained in clinic today.     Orthovisc: x2  NDC: [id number]  Product Code: [id number]  Lot: [id number]  Exp: 2026-05-28      Assessment:      Encounter Diagnoses   Name Primary?    Primary osteoarthritis of both knees     Chronic pain of both knees Yes        Plan:     1. Orthovisc injection of bilateral knee received today (see procedure note above)  2. Follow-up in 1 week for 3rd injection of 3 injection series  3. Patient agreeable to today's plan and all questions were answered      This note is dictated using the M*Modal Fluency Direct word recognition program. There are word recognition mistakes that are occasionally missed on review.

## 2024-11-20 ENCOUNTER — OFFICE VISIT (OUTPATIENT)
Dept: SPORTS MEDICINE | Facility: CLINIC | Age: 49
End: 2024-11-20
Payer: COMMERCIAL

## 2024-11-20 VITALS
HEART RATE: 80 BPM | WEIGHT: 212 LBS | DIASTOLIC BLOOD PRESSURE: 82 MMHG | BODY MASS INDEX: 41.62 KG/M2 | HEIGHT: 60 IN | SYSTOLIC BLOOD PRESSURE: 146 MMHG

## 2024-11-20 DIAGNOSIS — M25.561 CHRONIC PAIN OF BOTH KNEES: Primary | ICD-10-CM

## 2024-11-20 DIAGNOSIS — M17.0 PRIMARY OSTEOARTHRITIS OF BOTH KNEES: ICD-10-CM

## 2024-11-20 DIAGNOSIS — G89.29 CHRONIC PAIN OF BOTH KNEES: Primary | ICD-10-CM

## 2024-11-20 DIAGNOSIS — M25.562 CHRONIC PAIN OF BOTH KNEES: Primary | ICD-10-CM

## 2024-11-20 PROCEDURE — 20611 DRAIN/INJ JOINT/BURSA W/US: CPT | Mod: 50,S$GLB,, | Performed by: ORTHOPAEDIC SURGERY

## 2024-11-20 PROCEDURE — 99499 UNLISTED E&M SERVICE: CPT | Mod: S$GLB,,, | Performed by: ORTHOPAEDIC SURGERY

## 2024-11-20 PROCEDURE — 99999 PR PBB SHADOW E&M-EST. PATIENT-LVL III: CPT | Mod: PBBFAC,,, | Performed by: ORTHOPAEDIC SURGERY

## 2024-11-20 NOTE — PROGRESS NOTES
"Subjective:     CC: bilateral knee pain/osteoarthritis    Nereida is a 49 y.o. female coming in today for their 3rd Orthovisc injection to the bilateral knee. The patient reports their pain is 8/10 today.     Objective:     VITAL SIGNS: BP (!) 146/82   Pulse 80   Ht 5' (1.524 m)   Wt 96.2 kg (211 lb 15.6 oz)   BMI 41.40 kg/m²      Orthovisc Injection Procedure #3     A time out was performed, including verification of patient ID, procedure, site and side, availability of information and equipment, review of safety issues, and agreement with consent, the procedure site was marked.    Location: Knee joint, bilateral     Procedure RIGHT: The patient was prepped aseptically with alcohol and chlorhexidine. Ethyl Chloride spray was used prior to skin puncture to help numb the superficial skin. After cold spray was applied, 2-4 cc's of 0.2% ropivacaine was injected into the skin and superficial tissue at the injection site using a 22G, 1.5" needle to form an anesthetic tunnel and ensure proper needle placement into the knee joint space. Using a hemostat, the syringe was exchanged with the Orthovisc syringe, and 2 cc of Orthovisc was injected into the knee joint. The patient was in the supine position during the duration of this procedure and the injection approach was from the superolateral aspect.     Procedure LEFT: The patient was prepped aseptically with alcohol and chlorhexidine. Ethyl Chloride spray was used prior to skin puncture to help numb the superficial skin. After cold spray was applied, 2-4 cc's of 0.2% ropivacaine was injected into the skin and superficial tissue at the injection site using a 25G, 1.5" needle to form an anesthetic tunnel and ensure proper needle placement into the knee joint space. Cold spray was applied again and an 18G x 1.5" needle was used to enter the joint space and 5 cc of clear synovial fluid was aspirated. Using a hemostat, the syringe was exchanged with the Orthovisc syringe, " "and 2 cc of Orthovisc was injected into the knee joint. The patient was in the supine position during the duration of this procedure and the injection approach was from the superolateral aspect.     Ultrasound guidance was used for needle localization with SonoSite Edge 2, 15-6 MHz (L)probe(s). Images were saved and stored for documentation. The knee joint was well visualized. Dynamic visualization of the 22G x 1.5 and 25 G x 1.5" and 18 G x 1.5" needles was continuous throughout the procedure and maintained good position and correct needle placement.        Patient tolerance: The patient tolerated the procedure well with no immediate complications. There were no adverse reactions to the medication. Patient was instructed to apply ice to the joint for up to 20 minutes at a time and avoid strenuous activities for 24-36 hours following the injection. Following that time, the patient can resume activities as prior to the injection.     The patient was reminded to call the clinic immediately for any adverse side effects as explained in clinic today.     Orthovisc: X2:   NDC: [id number]  Product Code: [id number]  Lot: [id number]  Exp: 2026-05-28      Assessment:      Encounter Diagnoses   Name Primary?    Primary osteoarthritis of both knees     Chronic pain of both knees Yes        Plan:     1. Orthovisc injection of bilateral knee received today (see procedure note above)  2. Follow-up as needed  3. Patient agreeable to today's plan and all questions were answered      This note is dictated using the M*Modal Fluency Direct word recognition program. There are word recognition mistakes that are occasionally missed on review.              "

## 2024-11-21 ENCOUNTER — OFFICE VISIT (OUTPATIENT)
Dept: BARIATRICS | Facility: CLINIC | Age: 49
End: 2024-11-21
Payer: COMMERCIAL

## 2024-11-21 VITALS
BODY MASS INDEX: 40.6 KG/M2 | WEIGHT: 207.88 LBS | DIASTOLIC BLOOD PRESSURE: 68 MMHG | SYSTOLIC BLOOD PRESSURE: 145 MMHG | HEART RATE: 80 BPM | OXYGEN SATURATION: 98 %

## 2024-11-21 DIAGNOSIS — E66.01 CLASS 3 SEVERE OBESITY DUE TO EXCESS CALORIES WITH SERIOUS COMORBIDITY AND BODY MASS INDEX (BMI) OF 40.0 TO 44.9 IN ADULT: Primary | ICD-10-CM

## 2024-11-21 DIAGNOSIS — E66.813 CLASS 3 SEVERE OBESITY DUE TO EXCESS CALORIES WITH SERIOUS COMORBIDITY AND BODY MASS INDEX (BMI) OF 40.0 TO 44.9 IN ADULT: Primary | ICD-10-CM

## 2024-11-21 PROCEDURE — 99999 PR PBB SHADOW E&M-EST. PATIENT-LVL IV: CPT | Mod: PBBFAC,,, | Performed by: INTERNAL MEDICINE

## 2024-11-21 PROCEDURE — 99212 OFFICE O/P EST SF 10 MIN: CPT | Mod: S$GLB,,, | Performed by: INTERNAL MEDICINE

## 2024-11-21 RX ORDER — SEMAGLUTIDE 1.7 MG/.75ML
1.7 INJECTION, SOLUTION SUBCUTANEOUS
Qty: 9 ML | Refills: 0 | Status: SHIPPED | OUTPATIENT
Start: 2024-11-21

## 2024-11-21 NOTE — PATIENT INSTRUCTIONS
Start Wegovy 1.7 mg once a week       Decrease portions as soon as you start wegovy. Avoid fried, rich or greasy foods.      Some nausea in the first 2 weeks is not unusual.     If you get pain across the upper abdomen and around to your back, please call the office.       [email or web address] to sign up for coupon card.       Increase low impact activity as tolerated.  Avoid high impact activity, very heavy lifting or other exercise regimens that may cause discomfort.     1000 gabriela pb meal planner, meal idea and exercise handout given previously.    Helpful tips to survive the holidays:  Dont save yourself for the meal: Make sure you continue to eat high protein small meals every 3-4 hours to ensure to do not become over-hungry. Avoid temptation by not showing up to a holiday party or gathering hungry.   Plan ahead. Bring a dish to a party if you know there may not be an appropriate option.   Choose sugar-free drinks: Stick to water or other sugar-free beverages and make sure you are getting 6-8 cups of fluid each day (but not with meals!). Avoid alcohol, carbonated beverages, and high-fat/high-sugar beverages like hot chocolate and eggnog. Try sugar-free hot cocoa made with skim milk or water, or sugar-free spiced tea to add some holiday flair to your beverage (see sugar-free mulled cider recipe below)  Take your time: Eat mindfully. Dont graze on food throughout the day. Sit down to enjoy your small meals. Chew slowly and thoroughly. Cut your food into small pieces before eating.  Listen to your body: Stop eating as soon as you feel full. Do not feel pressured to try certain (or all) foods or to eat all of the food on your plate. Listen to your hunger cues.   REMEMBER: Make your holidays about spending time with family and friends instead of focusing gatherings around food.  Keep up your exercise routine: Make sure you continue to get regular exercise throughout the holiday season. Encourage friends and family to be  active by taking a walk together after a meal, to look at holiday lights, or to window-shop.    Good Holiday Meal Options:  Roasted Turkey, NO skin. Use low sodium broth instead of gravy.   Stuffed Bell Peppers made WITHOUT bread crumbs or Rice. Try using parmesan cheese instead  Gumbo, NO rice. Try picking out mostly the meat/seafood and vegetables with little broth.   Green Bean Casserole made with 98% fat free cream of mushroom soup and crushed almonds/pecans instead of fried onions  Side salad w/ low fat dressing. Try a different kind of salad maybe use Kale or spinach.   Roasted non-starchy vegetables like brussel sprouts, broccoli, green beans, zucchini, butternut squash, cauliflower  Cauliflower Mash (steam or roast cauliflower, puree w/ low fat cheese, dash of fat free milk and 2-3 sprays of I cant believe its not butter spray. Add garlic powder and black pepper to season). Use Low sodium broth instead of gravy.   Try Loaded Cauliflower Mash (Make cauliflower like above cauliflower mash. Top with diced turkey vela, ¼ cup low fat cheddar cheese and bake @ 350* F for 5-10 minutes, until cheese is melted. Top with minced chives, black pepper and garlic to taste).   Homemade cranberry sauce using Splenda or another alternative sweetener. Boil fresh cranberries and add splenda to taste. Boil until cranberries break open and then simmer until it reaches the consistency you want (less time for more watery sauce and simmer for longer to create a thicker sauce).   Deviled eggs: make using low fat clark, mustard, DILL relish (not sweet relish).   Vegetable tray w/ Greek yogurt Ranch Dip. Mix 1 packet of [place] ranch dip mix w/ 16 oz low fat plain greek yogurt.     Good Holiday Dessert Options:  High protein Pumpkin Cheesecake (see recipe below)  Pumpkin Whip (see recipe below)  Quest Apple Pie or Cinnamon Roll flavored protein bar (warm in microwave for 10-15 seconds)  Eggnog Protein shake (see recipe  below)  Fresh fruit w/ low fat cheese  Sugar-free Jello Parfaits. Layer Red and Green sugar-free jello in cups and top w/ 2 tbsp Sugar-free cool-whip    Pumpkin Cheesecake    8 ounces fat free cream cheese, softened   2 scoops of vanilla protein powder (<4 g sugar per serving)   ¼ tsp Fine salt   2 eggs, at room temperature   1/3 cup fat free sour cream  1/3 cup fat free half and half  1 15 -ounce can pure pumpkin puree   1 tablespoon pumpkin pie spice, plus more for dusting   Unsalted nuts, crushed  *Add splenda to taste    Directions     1. Preheat the oven to 300 degrees F. Line 18 muffin cups with paper liners. Sprinkle 1 tsp crushed unsalted nuts at the bottom of each of muffin cup liner.     2. In a large bowl, beat the cream cheese, vanilla protein powder and 1/4 teaspoon fine salt on medium-high speed until smooth and creamy, 2 to 3 minutes. Scrape down the sides, reduce speed to low and beat in the eggs, 1 at a time, until combined. Beat in 1/3 cup fat free sour cream and fat free half and half. Stir in the pumpkin puree and pumpkin pie spice until smooth. Divide evenly among cookie-lined paper cups, filling almost all the way to the top.     3. Bake until the filling is just set, 40 to 45 minutes. A sharp knife inserted into the center will come out moist, but clean. Cool completely in tins on a wire rack. Refrigerate until cold, 4 hours, or overnight. Top with a dusting of pumpkin pie spice.    Recipe altered from the following recipe: [email or web address]    Pumpkin Whip    Box of sugar-free vanilla pudding  Can of pumpkin puree  Pumpkin Pie spice (sprinkle to taste)  ½ cup of sugar-free Cool Whip    Directions:  Make sugar-free pudding according to package directions using fat free or 1% milk. Stir in pumpkin and cool whip. Add pumpkin pie spice to taste.     Egg Nog Protein shake    8 oz skim or 1% milk  1 scoop  vanilla protein powder  1 tbsp sugar-free vanilla pudding mix  ½ tsp butter flavor extract  ½ tsp rum extract  ½ tsp cinnamon     Shake together or blend with ice and serve.     Sugar-Free Mulled Cider    3 oz diet cran-apple juice  6 oz water  1 packet sugar-free apple cider mix  ½ tsp apple pie spice  ½ tsp butter flavor extract  1 tbsp Sugar-free Syrup    Mix together. Warm if needed and serve w/ orange wedge and cinnamon stick.

## 2024-11-21 NOTE — PROGRESS NOTES
Subjective     Patient ID: Nereida Narayanan is a 49 y.o. female.    Chief Complaint: Follow-up    Pt here today for follow-up. Has lost 17.1 lbs (-1.4 lbs muscle. -14 lbs fat).   Was to start 1000 gabriela pb meal planner, exercise and started wegovy, currently on 1.7 mg weekly.   Has been cutting back sugary drinks.   She is eating more fruit and veg, some protein. Skipping meals at times. Has not used planner.   Exercise.- increasing activity. Got a stepper and some walking. Has been having trouble with her knee. Just had injections.     New BMR: 1372    New PBF: 50.8%       Initial:      BMR: 1402    PBF:  53.2%      History of medication for loss: Phentermine last filled 1/10/23. Rxes from different providers. She did find it helped.   checked today           Review of Systems       Objective   BP (!) 145/68   Pulse 80   Wt 94.3 kg (207 lb 14.4 oz)   SpO2 98%   BMI 40.60 kg/m²    Physical Exam  Vitals reviewed.   Constitutional:       General: She is not in acute distress.     Appearance: She is well-developed.      Comments: With severe obesity     HENT:      Head: Normocephalic and atraumatic.   Eyes:      General: No scleral icterus.     Pupils: Pupils are equal, round, and reactive to light.   Cardiovascular:      Rate and Rhythm: Normal rate.   Pulmonary:      Effort: Pulmonary effort is normal. No respiratory distress.   Musculoskeletal:         General: Normal range of motion.   Skin:     General: Skin is warm and dry.      Findings: No erythema.   Neurological:      Mental Status: She is alert and oriented to person, place, and time.      Cranial Nerves: No cranial nerve deficit.   Psychiatric:         Behavior: Behavior normal.         Judgment: Judgment normal.            Assessment and Plan     1. Class 3 severe obesity due to excess calories with serious comorbidity and body mass index (BMI) of 40.0 to 44.9 in adult  -     semaglutide, weight loss, (WEGOVY) 1.7 mg/0.75 mL PnIj; Inject 1.7 mg into  the skin every 7 days.  Dispense: 9 mL; Refill: 0            1. Class 3 severe obesity due to excess calories with serious comorbidity and body mass index (BMI) of 40.0 to 44.9 in adult  Start Wegovy 1.7 mg once a week       Decrease portions as soon as you start wegovy. Avoid fried, rich or greasy foods.      Some nausea in the first 2 weeks is not unusual.     If you get pain across the upper abdomen and around to your back, please call the office.       [email or web address] to sign up for coupon card.       Increase low impact activity as tolerated.  Avoid high impact activity, very heavy lifting or other exercise regimens that may cause discomfort.     1000 gabriela pb meal planner, meal idea and exercise handout given previously.      Holiday tips given           No follow-ups on file.

## 2024-12-11 ENCOUNTER — PATIENT MESSAGE (OUTPATIENT)
Dept: SPORTS MEDICINE | Facility: CLINIC | Age: 49
End: 2024-12-11
Payer: COMMERCIAL

## 2024-12-18 DIAGNOSIS — M17.0 PRIMARY OSTEOARTHRITIS OF BOTH KNEES: Primary | ICD-10-CM

## 2024-12-30 DIAGNOSIS — M17.0 PRIMARY OSTEOARTHRITIS OF BOTH KNEES: Primary | ICD-10-CM

## 2024-12-30 RX ORDER — NAPROXEN 500 MG/1
500 TABLET ORAL 2 TIMES DAILY
Qty: 60 TABLET | Refills: 1 | Status: SHIPPED | OUTPATIENT
Start: 2024-12-30

## 2024-12-30 NOTE — PROGRESS NOTES
Patient would like to switch to Naproxen 500mg. Refilled and she is to stop the ibuprofen 800mg.

## 2025-01-04 ENCOUNTER — HOSPITAL ENCOUNTER (EMERGENCY)
Facility: HOSPITAL | Age: 50
Discharge: HOME | End: 2025-01-04
Attending: EMERGENCY MEDICINE
Payer: COMMERCIAL

## 2025-01-04 VITALS
OXYGEN SATURATION: 100 % | TEMPERATURE: 98 F | SYSTOLIC BLOOD PRESSURE: 186 MMHG | RESPIRATION RATE: 19 BRPM | BODY MASS INDEX: 42.21 KG/M2 | WEIGHT: 215 LBS | DIASTOLIC BLOOD PRESSURE: 79 MMHG | HEIGHT: 60 IN | HEART RATE: 60 BPM

## 2025-01-04 DIAGNOSIS — N30.01 ACUTE CYSTITIS WITH HEMATURIA: Primary | ICD-10-CM

## 2025-01-04 LAB
B-HCG UR QL: NEGATIVE
BACTERIA #/AREA URNS AUTO: ABNORMAL /HPF
BILIRUB UR QL STRIP: NEGATIVE
CLARITY UR REFRACT.AUTO: ABNORMAL
COLOR UR AUTO: YELLOW
CTP QC/QA: YES
GLUCOSE UR QL STRIP: NEGATIVE
HGB UR QL STRIP: ABNORMAL
HYALINE CASTS UR QL AUTO: 0 /LPF
KETONES UR QL STRIP: NEGATIVE
LEUKOCYTE ESTERASE UR QL STRIP: ABNORMAL
MICROSCOPIC COMMENT: ABNORMAL
NITRITE UR QL STRIP: NEGATIVE
PH UR STRIP: 6 [PH] (ref 5–8)
PROT UR QL STRIP: ABNORMAL
RBC #/AREA URNS AUTO: >100 /HPF (ref 0–4)
SP GR UR STRIP: 1.02 (ref 1–1.03)
SQUAMOUS #/AREA URNS AUTO: 3 /HPF
URN SPEC COLLECT METH UR: ABNORMAL
WBC #/AREA URNS AUTO: >100 /HPF (ref 0–5)

## 2025-01-04 PROCEDURE — 81025 URINE PREGNANCY TEST: CPT | Performed by: PHYSICIAN ASSISTANT

## 2025-01-04 PROCEDURE — 87186 SC STD MICRODIL/AGAR DIL: CPT | Performed by: PHYSICIAN ASSISTANT

## 2025-01-04 PROCEDURE — 81001 URINALYSIS AUTO W/SCOPE: CPT | Performed by: PHYSICIAN ASSISTANT

## 2025-01-04 PROCEDURE — 87088 URINE BACTERIA CULTURE: CPT | Performed by: PHYSICIAN ASSISTANT

## 2025-01-04 PROCEDURE — 99284 EMERGENCY DEPT VISIT MOD MDM: CPT

## 2025-01-04 PROCEDURE — 87086 URINE CULTURE/COLONY COUNT: CPT | Performed by: PHYSICIAN ASSISTANT

## 2025-01-04 RX ORDER — CEPHALEXIN 500 MG/1
500 CAPSULE ORAL 4 TIMES DAILY
Qty: 28 CAPSULE | Refills: 0 | Status: SHIPPED | OUTPATIENT
Start: 2025-01-04 | End: 2025-01-04

## 2025-01-04 RX ORDER — CEPHALEXIN 500 MG/1
500 CAPSULE ORAL 4 TIMES DAILY
Qty: 28 CAPSULE | Refills: 0 | Status: SHIPPED | OUTPATIENT
Start: 2025-01-04 | End: 2025-01-11

## 2025-01-04 NOTE — ED PROVIDER NOTES
Encounter Date: 2025       History     Chief Complaint   Patient presents with    Painful Urination      Sent from ; left flank pain x 2 days with tea colored urine. UA at  shows bili leuks protein and bili      49 y.o. Female with a PMHx of osteoarthritis presents to the ED with dysuria x2 days. She has associated urinary frequency and urgency. She does not have flank pain though notes to upper back pain for the past few days. She denies hematuria, abdominal pain, fever, chills, nausea or vomiting.    The history is provided by the patient.     Review of patient's allergies indicates:   Allergen Reactions    Adhesive      Causes skin tearing and redness      Past Medical History:   Diagnosis Date    Allergy     Bilateral chronic knee pain     Morbid obesity with BMI of 45.0-49.9, adult     Osteoarthritis of both knees      Past Surgical History:   Procedure Laterality Date    ARTHROSCOPIC CHONDROPLASTY OF KNEE JOINT Right 2020    Procedure: ARTHROSCOPY, KNEE, WITH CHONDROPLASTY;  Surgeon: Aiden Clarke DO;  Location: [facility] OR;  Service: Orthopedics;  Laterality: Right;     SECTION      KNEE ARTHROSCOPY W/ MENISCECTOMY Right 2020    Procedure: ARTHROSCOPY, KNEE, WITH MENISCECTOMY;  Surgeon: Aiden Clarke DO;  Location: [facility] OR;  Service: Orthopedics;  Laterality: Right;  Equipment: Scope; [employer] Truspar Meniscus Repair System; [place] chondral drill set  Vendor: [employer]; [employer]  Table: Supine    KNEE ARTHROSCOPY W/ PLICA EXCISION Right 2020    Procedure: EXCISION, PLICA, KNEE, ARTHROSCOPIC;  Surgeon: Aiden Clarke DO;  Location: [facility] OR;  Service: Orthopedics;  Laterality: Right;     Family History   Problem Relation Name Age of Onset    Hypertension Mother      Alcohol abuse Father Yaya         none    Breast cancer Neg Hx      Ovarian cancer Neg Hx       Social History     Tobacco Use    Smoking status: Never    Smokeless tobacco: Never   Substance Use Topics    Alcohol use:  No    Drug use: No     Review of Systems   Constitutional:  Negative for chills and fever.   Gastrointestinal:  Negative for abdominal pain, nausea and vomiting.   Genitourinary:  Positive for dysuria, frequency and urgency. Negative for flank pain and hematuria.   Musculoskeletal:  Positive for back pain.       Physical Exam     Initial Vitals [01/04/25 1017]   BP Pulse Resp Temp SpO2   (!) 186/79 60 19 97.6 °F (36.4 °C) 100 %      MAP       --         Physical Exam    Nursing note and vitals reviewed.  Constitutional: She appears well-developed and well-nourished. She is not diaphoretic. No distress.   HENT:   Head: Normocephalic and atraumatic.   Nose: Nose normal.   Eyes: Conjunctivae and EOM are normal.   Neck: Neck supple.   Cardiovascular:  Normal rate.           Pulmonary/Chest: No respiratory distress.   Abdominal: Abdomen is soft. There is no abdominal tenderness.   No right CVA tenderness.  No left CVA tenderness. There is no guarding.   Musculoskeletal:      Cervical back: Neck supple.     Neurological: She is alert and oriented to person, place, and time. Gait normal.   Skin: No rash noted.   Psychiatric: She has a normal mood and affect. Her behavior is normal. Judgment and thought content normal.         ED Course   Procedures  Labs Reviewed   URINALYSIS, REFLEX TO URINE CULTURE - Abnormal       Result Value    Specimen UA Urine, Clean Catch      Color, UA Yellow      Appearance, UA Hazy (*)     pH, UA 6.0      Specific Gravity, UA 1.020      Protein, UA 1+ (*)     Glucose, UA Negative      Ketones, UA Negative      Bilirubin (UA) Negative      Occult Blood UA 3+ (*)     Nitrite, UA Negative      Leukocytes, UA 3+ (*)     Narrative:     Specimen Source->Urine   URINALYSIS MICROSCOPIC - Abnormal    RBC, UA >100 (*)     WBC, UA >100 (*)     Bacteria Moderate (*)     Squam Epithel, UA 3      Hyaline Casts, UA 0      Microscopic Comment SEE COMMENT      Narrative:     Specimen Source->Urine   CULTURE,  URINE   POCT URINE PREGNANCY    POC Preg Test, Ur Negative       Acceptable Yes            Imaging Results    None          Medications - No data to display  Medical Decision Making  49 y.o. Female with a PMHx of osteoarthritis presents to the ED with dysuria x2 days. Nontoxic appearing. Vitals with HTN. Afebrile. Exam as above. I will initiate workup and reassess.    Ddx: UTI, pyelonephritis, obstructive uropathy    Patient presents today with urinary symptoms.  Urinalysis positive for 3+ leukocytes and 3+ blood.  Nitrite negative.  On urinalysis microscopic there is moderate bacteria with greater than 100 WBCs and RBCs consistent with a urinary tract infection.  No prior urine culture and sensitivities.  I will treat UTI with Keflex..  She denies flank pain.  She does not have CVA tenderness on exam.  I do not suspect pyelonephritis at this time.  I do not suspect obstructive uropathy at this time as she does not have flank, abdominal pain, N/V.  Hematuria noted on her urinalysis the patient just started her menstrual cycle.  She is hypertensive though otherwise her vitals are stable and well-appearing. She is stable at this time for discharge. Strict ED precautions given to return immediately for new, worsening, or concerning symptoms    Amount and/or Complexity of Data Reviewed  Labs: ordered.    Risk  Prescription drug management.               ED Course as of 01/04/25 1126   Sat Jan 04, 2025   1126 Blood, UA(!): 3+ [HM]   1126 NITRITE UA: Negative [HM]   1126 Leukocyte Esterase, UA(!): 3+ [HM]   1126 Bilirubin (UA): Negative [HM]   1126 Protein, UA(!): 1+ [HM]   1126 RBC, UA(!): >100 [HM]   1126 WBC, UA(!): >100 [HM]   1126 Bacteria, UA(!): Moderate [HM]   1126 Squam Epithel, UA: 3 [HM]   1126 hCG Qualitative, Urine: Negative [HM]      ED Course User Index  [HM] Gayla Bishop PA-C                           Clinical Impression:  Final diagnoses:  [N30.01] Acute cystitis with hematuria  (Primary)          ED Disposition Condition    Discharge Stable          ED Prescriptions       Medication Sig Dispense Start Date End Date Auth. Provider    cephALEXin (KEFLEX) 500 MG capsule  (Status: Discontinued) Take 1 capsule (500 mg total) by mouth 4 (four) times daily. for 7 days 28 capsule 1/4/2025 1/4/2025 Gayla Bishop PA-C    cephALEXin (KEFLEX) 500 MG capsule Take 1 capsule (500 mg total) by mouth 4 (four) times daily. for 7 days 28 capsule 1/4/2025 1/11/2025 Gayla Bishop PA-C          Follow-up Information       Follow up With Specialties Details Why Contact Info    Jitendra Vlaadez III, MD Family Medicine  As needed [address] [facility]  [address] [place] [id number]  [phone]      [facility] - Emergency Dept Emergency Medicine  If symptoms worsen [address] [facility] [id number]  [phone]             Gayla Bishop PA-C  01/04/25 1126

## 2025-01-04 NOTE — DISCHARGE INSTRUCTIONS
You were diagnosed with a urinary tract infection.  Drink plenty of water.  This is treated with an antibiotic known as Keflex.    Strict ED precautions given to return immediately for new, worsening, or concerning symptoms including but not limited to flank pain/back pain, abdominal pain, worsening of your urinary symptoms, fever

## 2025-01-04 NOTE — ED NOTES
Patient identifiers for Nereida Narayanan 49 y.o. female checked and correct.  Chief Complaint   Patient presents with    Painful Urination      Sent from ; left flank pain x 2 days with tea colored urine. UA at  shows bili leuks protein and bili      Past Medical History:   Diagnosis Date    Allergy     Bilateral chronic knee pain     Morbid obesity with BMI of 45.0-49.9, adult     Osteoarthritis of both knees      Allergies reported:   Review of patient's allergies indicates:   Allergen Reactions    Adhesive      Causes skin tearing and redness        Appearance: Pt awake, alert & oriented to person, place & time. Pt in no acute distress at present time. Pt is clean and well groomed with clothes appropriately fastened.   Skin: Skin warm, dry & intact. Color consistent with ethnicity. Mucous membranes moist. No breakdown or brusing noted.   Musculoskeletal: Patient moving all extremities well, no obvious swelling or deformities noted.   Respiratory: Respirations spontaneous, even, and non-labored. Visible chest rise noted. Airway is open and patent. No accessory muscle use noted.   Neurologic: Sensation is intact. Speech is clear and appropriate. Eyes open spontaneously, behavior appropriate to situation, follows commands, facial expression symmetrical, bilateral hand grasp equal and even, purposeful motor response noted.  Cardiac: All peripheral pulses present. No Bilateral lower extremity edema. Cap refill is <3 seconds.  Abdomen: Abdomen soft, non distended, non tender to palpation.   : Pt voids independently, denies dysuria, hematuria, frequency.

## 2025-01-06 LAB — BACTERIA UR CULT: ABNORMAL

## 2025-01-07 ENCOUNTER — PATIENT MESSAGE (OUTPATIENT)
Dept: BARIATRICS | Facility: CLINIC | Age: 50
End: 2025-01-07
Payer: COMMERCIAL

## 2025-01-09 ENCOUNTER — TELEPHONE (OUTPATIENT)
Dept: SPORTS MEDICINE | Facility: CLINIC | Age: 50
End: 2025-01-09
Payer: COMMERCIAL

## 2025-01-09 ENCOUNTER — OFFICE VISIT (OUTPATIENT)
Dept: SPORTS MEDICINE | Facility: CLINIC | Age: 50
End: 2025-01-09
Payer: COMMERCIAL

## 2025-01-09 VITALS
SYSTOLIC BLOOD PRESSURE: 162 MMHG | HEIGHT: 60 IN | DIASTOLIC BLOOD PRESSURE: 91 MMHG | WEIGHT: 214.94 LBS | BODY MASS INDEX: 42.2 KG/M2

## 2025-01-09 DIAGNOSIS — G89.29 CHRONIC PAIN OF BOTH KNEES: Primary | ICD-10-CM

## 2025-01-09 DIAGNOSIS — M25.562 CHRONIC PAIN OF BOTH KNEES: Primary | ICD-10-CM

## 2025-01-09 DIAGNOSIS — M17.0 PRIMARY OSTEOARTHRITIS OF BOTH KNEES: ICD-10-CM

## 2025-01-09 DIAGNOSIS — M25.561 CHRONIC PAIN OF BOTH KNEES: Primary | ICD-10-CM

## 2025-01-09 PROCEDURE — 99999 PR PBB SHADOW E&M-EST. PATIENT-LVL III: CPT | Mod: PBBFAC,,, | Performed by: ORTHOPAEDIC SURGERY

## 2025-01-09 NOTE — PROGRESS NOTES
CC: bilateral knee pain    Nereida is here today for follow up evaluation of her bilateral knee pain. Patient reports her pain is 7/10 today. She completed bilateral knee Orthovisc series 2024 and denies appreciating improvement in her pain following. Her most recent bilateral knee aspiration and CSI was 10/31/2024 with Imer Hanson PA-C. She is interested in having both of her knees drained today as she is not yet eligible for CSI.     Recall from visit on 2024  Nereida is here today for follow up evaluation of her bilateral knee pain. Patient reports her pain is 7/10 today. She most recently had bilateral intra-articular knee Zilretta injections at visit 2024 and admits to appreciating improvement in her pain following. She states she is not feeling as swollen today. She also admits to left ankle swelling over the past 1 week without mechanism of injury.        PAST MEDICAL HISTORY:   Past Medical History:   Diagnosis Date    Allergy     Bilateral chronic knee pain     Morbid obesity with BMI of 45.0-49.9, adult     Osteoarthritis of both knees        PAST SURGICAL HISTORY:   Past Surgical History:   Procedure Laterality Date    ARTHROSCOPIC CHONDROPLASTY OF KNEE JOINT Right 2020    Procedure: ARTHROSCOPY, KNEE, WITH CHONDROPLASTY;  Surgeon: Aiden Clarke DO;  Location: [facility] OR;  Service: Orthopedics;  Laterality: Right;     SECTION      KNEE ARTHROSCOPY W/ MENISCECTOMY Right 2020    Procedure: ARTHROSCOPY, KNEE, WITH MENISCECTOMY;  Surgeon: Aiden Clarke DO;  Location: [facility] OR;  Service: Orthopedics;  Laterality: Right;  Equipment: Scope; [employer] Tr[employer]par Meniscus Repair System; [employer] chondral drill set  Vendor: [employer]; [place]  Table: Supine    KNEE ARTHROSCOPY W/ PLICA EXCISION Right 2020    Procedure: EXCISION, PLICA, KNEE, ARTHROSCOPIC;  Surgeon: Aiden Clarke DO;  Location: [facility] OR;  Service: Orthopedics;  Laterality: Right;       FAMILY HISTORY:   Family  History   Problem Relation Name Age of Onset    Hypertension Mother      Alcohol abuse Father Yaya         none    Breast cancer Neg Hx      Ovarian cancer Neg Hx         SOCIAL HISTORY:   Social History     Socioeconomic History    Marital status: Single   Tobacco Use    Smoking status: Never    Smokeless tobacco: Never   Substance and Sexual Activity    Alcohol use: No    Drug use: No    Sexual activity: Yes     Partners: Male     Birth control/protection: None     Social Drivers of Health     Financial Resource Strain: Low Risk  (12/11/2023)    Overall Financial Resource Strain (CARDIA)     Difficulty of Paying Living Expenses: Not hard at all   Food Insecurity: No Food Insecurity (12/11/2023)    Hunger Vital Sign     Worried About Running Out of Food in the Last Year: Never true     Ran Out of Food in the Last Year: Never true   Transportation Needs: No Transportation Needs (12/11/2023)    PRAPARE - Transportation     Lack of Transportation (Medical): No     Lack of Transportation (Non-Medical): No   Physical Activity: Sufficiently Active (12/11/2023)    Exercise Vital Sign     Days of Exercise per Week: 5 days     Minutes of Exercise per Session: 30 min   Stress: No Stress Concern Present (12/11/2023)    [employer] - Occupational Stress Questionnaire     Feeling of Stress : Not at all   Housing Stability: Unknown (12/11/2023)    Housing Stability Vital Sign     Unable to Pay for Housing in the Last Year: No     Unstable Housing in the Last Year: No       MEDICATIONS:     Current Outpatient Medications:     cephALEXin (KEFLEX) 500 MG capsule, Take 1 capsule (500 mg total) by mouth 4 (four) times daily. for 7 days, Disp: 28 capsule, Rfl: 0    diclofenac sodium (VOLTAREN) 1 % Gel, Apply 2 g topically 4 (four) times daily., Disp: 20 g, Rfl: 2    LIDOcaine-prilocaine (EMLA) cream, Apply topically as needed., Disp: 30 g, Rfl: 5    naproxen (NAPROSYN) 500 MG tablet, Take 1 tablet (500  mg total) by mouth 2 (two) times daily., Disp: 60 tablet, Rfl: 1    semaglutide, weight loss, (WEGOVY) 1.7 mg/0.75 mL PnIj, Inject 1.7 mg into the skin every 7 days., Disp: 9 mL, Rfl: 0    furosemide (LASIX) 40 MG tablet, Take 1 tablet (40 mg total) by mouth once daily., Disp: 30 tablet, Rfl: 3    gabapentin (NEURONTIN) 100 MG capsule, Take 1 capsule (100 mg total) by mouth 3 (three) times daily., Disp: 90 capsule, Rfl: 6    ibuprofen (ADVIL,MOTRIN) 800 MG tablet, Take 1 tablet (800 mg total) by mouth 2 (two) times daily as needed for Pain. (Patient not taking: Reported on 10/31/2024), Disp: 90 tablet, Rfl: 0    methocarbamoL (ROBAXIN) 500 MG Tab, Take 1 tablet (500 mg total) by mouth 3 (three) times daily as needed (pain). (Patient not taking: Reported on 10/31/2024), Disp: 45 tablet, Rfl: 0    ALLERGIES:   Review of patient's allergies indicates:   Allergen Reactions    Adhesive      Causes skin tearing and redness         PHYSICAL EXAMINATION:  BP (!) 162/91   Ht 5' (1.524 m)   Wt 97.5 kg (214 lb 15.2 oz)   LMP 12/04/2024 (Approximate)   BMI 41.98 kg/m²   Vitals signs and nursing note have been reviewed.  General: In no acute distress, well developed, well nourished, no diaphoresis  Eyes: EOM full and smooth, no eye redness or discharge  HENT: normocephalic and atraumatic, neck supple, trachea midline, no nasal discharge, no external ear redness or discharge  Cardiovascular: 2+ and symmetric DP pulses bilaterally, no LE edema  Lungs: respirations non-labored, no conversational dyspnea   Abd: non-distended, no rigidity  MSK: no amputation or deformity, no swelling of extremities  Neuro: AAOx3, CN2-12 grossly intact  Skin: No rashes, warm and dry  Psychiatric: cooperative, pleasant, mood and affect appropriate for age    MUSCULOSKELETAL EXAM:    BILATERAL KNEE EXAMINATION   Affected side is compared to contralateral knee     Observation:  No edema, erythema, ecchymosis noted. Small suprapatellar effusion on  right  No muscle atrophy of the thighs and calves noted.  No obvious bony deformities noted.   No genu valgus/varum noted. No recurvatum noted.      Tenderness:  Patella - + bilaterally   Lateral joint line - + bilaterally  Quad tendon - none   Medial joint line - + bilaterally  Patellar tendon - none  Medial plica - none  Tibial tubercle - none   Lateral plica - none  Pes anserine - + b/l   MCL prox - none  Distal ITB - none   MCL distal - none  MFC - none    LCL prox - none  LFC - none    LCL distal - none  Tibia - none    Fibula - none    No obvious bursae, plicae, popliteal cysts, or tendon derangement palpated.          ROM:   Active extension to 0° on left without hyperextension, lag, or patellar J sign.   Active extension to 0° on right without hyperextension, lag, or patellar J sign.   + crepitus with extension bilaterally  Active flexion to 120° on left and 120° on right.    Strength: (bilaterally)  Knee Flexion - 5/5 on left and 5/5 on right  Knee Extension - 5/5 on left and 5/5 on right  Hip Flexion - 5/5 on left and 5/5 on right  Hip Extension - 5/5 on left and 5/5 on right  Ankle dorsiflexion - 5/5 on left and 5/5 on right  Ankle Plantarflexion - 5/5 on left and 5/5 on right    Patellofemoral Exam:  Patellar ballottement - positive on right  Bulge sign - negative  Patellar grind - negative on left, positive on right  No patellar laxity with medial and lateral translation   No apprehension with medial and lateral patellar translation.     Ligament Testing:  Lachman's test - negative  No laxity with varus testing at 0 and 30 degrees.  No laxity with valgus testing at 0 and 30 degrees.    Neurovascular Examination:   Antalgic gait  Sensation intact to light touch in the obturator, lateral/intermediate/medial/posterior femoral cutaneous, saphenous, and common peroneal nerves bilaterally.  Pulses intact at the DP and PT arteries bilaterally.    Capillary refill intact <2 seconds in all toes  "bilaterally.    IMAGIN. X-ray obtained on 2022 due to bilateral knee pain  2. X-ray images were reviewed personally by me and then directly with patient.  3. FINDINGS: X-ray images obtained demonstrate tricompartmental degenerative changes, most pronounced in the medial compartment bilaterally.  Kellgren and Clay grade 3  4. IMPRESSION: As above.     1. X-ray obtained 2024 due to bilateral knee pain   2. X-ray images were reviewed personally by me and then directly with patient.  3. FINDINGS: X-ray images obtained demonstrate nonspecific suprapatellar joint effusion on the right and possible suprapatellar joint effusion on the left, tricompartmental degenerative changes, most pronounced at the medial compartment and progressed from . Kellgren and Clay grade 3  4. IMPRESSION: As above.     PROCEDURE:  Large Joint Aspiration/Injection  Knee joint, bilateral  Performed by: PEYMAN MCGUIRE  Authorized by: PEYMAN MCGUIRE  Consent Done?: Yes (Verbal)  Indications: Pain and joint effusion  Site marked: The procedure site was marked   Timeout: Prior to procedure the correct patient, procedure, and site was verified   Location: Knee joint, bilateral  Prep: Patient was prepped with Chlorhexidine and alcohol.  Ultrasound Guidance for needle placement: yes    Procedure RIGHT: Ethyl Chloride spray was used prior to skin puncture. After cold spray was applied, 2-4 cc's of 0.2% ropivacaine was injected into the skin and superficial tissue at the injection site using a 25G, 1.5" needle to form an anesthetic tunnel and ensure proper placement of the needle into the joint space. After local anesthetic was applied an 18G, 1.5 needle was used to enter the knee joint capsule under US guidance. 30 cc of clear synovial fluid was aspirated.     Procedure LEFT: Ethyl Chloride spray was used prior to skin puncture. After cold spray was applied, 2-4 cc's of 0.2% ropivacaine was injected into the skin and " "superficial tissue at the injection site using a 25G, 1.5" needle to form an anesthetic tunnel and ensure proper placement of the needle into the joint space. After local anesthetic was applied an 18G, 1.5 needle was used to enter the knee joint capsule under US guidance. 8 cc of clear synovial fluid was aspirated.     Approach: superolateral  Patient tolerance: Patient tolerated the procedure well with no immediate complications. Improvement noted after aspiration. Patient was wrapped following bandaging.    Ultrasound guidance was used for needle localization with SonDemeurete Edge 2, 15-6 MHz (L)probe(s). Images were saved and stored for documentation. Short and long axis images of the anterior knee were taken prior to injection confirming presence of joint effusion. The knee joint well visualized. Dynamic visualization of the needles was continuous throughout the procedure and maintained good position and correct needle placement.      ASSESSMENT:      ICD-10-CM ICD-9-CM   1. Chronic pain of both knees  M25.561 719.46    M25.562 338.29    G89.29    2. Primary osteoarthritis of both knees  M17.0 715.16         PLAN:  1-2.  Chronic bilateral knee pain/osteoarthritis - improved, recent return of symptoms    - Nereida is following up today for her chronic bilateral knee pain which has been slowly worsening over several years.  She has also been appreciating increased swelling and instability which worsens with walking and long periods of standing at work.    - She completed bilateral knee Orthovisc series 11/20/2024 and denies appreciating improvement in her pain following. Her most recent bilateral knee aspiration and CSI was 10/31/2024 with Imer Hanson PA-C. She is interested in repeat aspirations today as she feels as though her knees are swollen.     - We reviewed the natural history of osteoarthritis and a multipronged treatment approach. We reviewed the importance of addressing three different aspects to best " manage this condition. Controlling the intra-articular immune reaction through medications and/or injections, improving local stability through bracing and/or injection, and improving functional stability through hip, core, and ankle strength, stability and mobility which may benefit from formal physical therapy.    - After discussing options she elects to proceed with ultrasound guided bilateral knee aspirations. See above for procedure detail.     - Continue with bilateral medial hinged knee braces during activity.     - We discussed evaluation again for TKA, and she is wanting to pursue this option. She is scheduled again with Dr. Barrios.       Future planning includes - possible bilateral intra-articular knee CSI in 4 weeks (education provided on no surgery x 3 months following CSI), follow up with Dr. Barrios     All questions were answered to the best of my ability and all concerns were addressed at this time.    Follow up with Dr. Barrios.

## 2025-01-09 NOTE — TELEPHONE ENCOUNTER
Spoke to the Pt about her check in status. She stated that she was at the wrong location and could still make it if we could see her when she arrived. Informed her of the time that she would need to make it here for. She understood and confirmed the details.

## 2025-01-13 ENCOUNTER — PATIENT MESSAGE (OUTPATIENT)
Dept: SPORTS MEDICINE | Facility: CLINIC | Age: 50
End: 2025-01-13
Payer: COMMERCIAL

## 2025-01-14 DIAGNOSIS — G89.29 CHRONIC PAIN OF BOTH KNEES: ICD-10-CM

## 2025-01-14 DIAGNOSIS — M25.562 CHRONIC PAIN OF BOTH KNEES: ICD-10-CM

## 2025-01-14 DIAGNOSIS — M25.561 CHRONIC PAIN OF BOTH KNEES: ICD-10-CM

## 2025-01-14 DIAGNOSIS — M17.0 PRIMARY OSTEOARTHRITIS OF BOTH KNEES: ICD-10-CM

## 2025-01-14 RX ORDER — IBUPROFEN 800 MG/1
800 TABLET ORAL 2 TIMES DAILY PRN
Qty: 90 TABLET | Refills: 0 | Status: SHIPPED | OUTPATIENT
Start: 2025-01-14

## 2025-01-30 ENCOUNTER — HOSPITAL ENCOUNTER (OUTPATIENT)
Dept: RADIOLOGY | Facility: HOSPITAL | Age: 50
Discharge: HOME | End: 2025-01-30
Attending: ORTHOPAEDIC SURGERY
Payer: COMMERCIAL

## 2025-01-30 DIAGNOSIS — M17.0 PRIMARY OSTEOARTHRITIS OF BOTH KNEES: ICD-10-CM

## 2025-01-30 PROCEDURE — 73564 X-RAY EXAM KNEE 4 OR MORE: CPT | Mod: TC,50

## 2025-01-30 PROCEDURE — 73564 X-RAY EXAM KNEE 4 OR MORE: CPT | Mod: 26,,, | Performed by: RADIOLOGY

## 2025-02-04 ENCOUNTER — TELEPHONE (OUTPATIENT)
Dept: INTERNAL MEDICINE | Facility: CLINIC | Age: 50
End: 2025-02-04
Payer: COMMERCIAL

## 2025-02-06 ENCOUNTER — OFFICE VISIT (OUTPATIENT)
Dept: SPORTS MEDICINE | Facility: CLINIC | Age: 50
End: 2025-02-06
Payer: COMMERCIAL

## 2025-02-06 ENCOUNTER — PATIENT OUTREACH (OUTPATIENT)
Dept: ADMINISTRATIVE | Facility: HOSPITAL | Age: 50
End: 2025-02-06
Payer: COMMERCIAL

## 2025-02-06 ENCOUNTER — PATIENT MESSAGE (OUTPATIENT)
Dept: ADMINISTRATIVE | Facility: HOSPITAL | Age: 50
End: 2025-02-06
Payer: COMMERCIAL

## 2025-02-06 VITALS
DIASTOLIC BLOOD PRESSURE: 80 MMHG | HEIGHT: 60 IN | BODY MASS INDEX: 39.22 KG/M2 | SYSTOLIC BLOOD PRESSURE: 152 MMHG | WEIGHT: 199.75 LBS

## 2025-02-06 DIAGNOSIS — G89.29 CHRONIC PAIN OF BOTH KNEES: Primary | ICD-10-CM

## 2025-02-06 DIAGNOSIS — M25.561 CHRONIC PAIN OF BOTH KNEES: Primary | ICD-10-CM

## 2025-02-06 DIAGNOSIS — M25.562 CHRONIC PAIN OF BOTH KNEES: Primary | ICD-10-CM

## 2025-02-06 DIAGNOSIS — M17.0 PRIMARY OSTEOARTHRITIS OF BOTH KNEES: ICD-10-CM

## 2025-02-06 PROCEDURE — 3077F SYST BP >= 140 MM HG: CPT | Mod: CPTII,S$GLB,, | Performed by: ORTHOPAEDIC SURGERY

## 2025-02-06 PROCEDURE — 3079F DIAST BP 80-89 MM HG: CPT | Mod: CPTII,S$GLB,, | Performed by: ORTHOPAEDIC SURGERY

## 2025-02-06 PROCEDURE — 1160F RVW MEDS BY RX/DR IN RCRD: CPT | Mod: CPTII,S$GLB,, | Performed by: ORTHOPAEDIC SURGERY

## 2025-02-06 PROCEDURE — 1159F MED LIST DOCD IN RCRD: CPT | Mod: CPTII,S$GLB,, | Performed by: ORTHOPAEDIC SURGERY

## 2025-02-06 PROCEDURE — 99999 PR PBB SHADOW E&M-EST. PATIENT-LVL III: CPT | Mod: PBBFAC,,, | Performed by: ORTHOPAEDIC SURGERY

## 2025-02-06 PROCEDURE — 99214 OFFICE O/P EST MOD 30 MIN: CPT | Mod: 25,S$GLB,, | Performed by: ORTHOPAEDIC SURGERY

## 2025-02-06 PROCEDURE — 3008F BODY MASS INDEX DOCD: CPT | Mod: CPTII,S$GLB,, | Performed by: ORTHOPAEDIC SURGERY

## 2025-02-06 PROCEDURE — 20611 DRAIN/INJ JOINT/BURSA W/US: CPT | Mod: 50,S$GLB,, | Performed by: ORTHOPAEDIC SURGERY

## 2025-02-06 RX ORDER — TRIAMCINOLONE ACETONIDE 40 MG/ML
40 INJECTION, SUSPENSION INTRA-ARTICULAR; INTRAMUSCULAR
Status: COMPLETED | OUTPATIENT
Start: 2025-02-06 | End: 2025-02-06

## 2025-02-06 RX ADMIN — TRIAMCINOLONE ACETONIDE 40 MG: 40 INJECTION, SUSPENSION INTRA-ARTICULAR; INTRAMUSCULAR at 02:02

## 2025-02-06 NOTE — PROGRESS NOTES
Population Health Chart Review & Patient Outreach Details    Outreach Performed: YES Portal Active and/or Letter inactive    Additional [facility] Health Notes:    BREAST CANCER SCREENING    Non-compliant report chart audits for BREAST CANCER SCREENING     Outreach to patient in reference to SCHEDULING A MAMMOGRAM EXAM.            Updates Requested / Reviewed:               Health Maintenance Topics Overdue:      VBHM Score: 3     Colon Cancer Screening  Mammogram  Hemoglobin A1c

## 2025-02-06 NOTE — PROGRESS NOTES
CC: bilateral knee pain    Nereida is here today for follow up evaluation of her bilateral knee pain. Patient reports her pain is 8/10 today. Her most recent bilateral knee aspiration was 2025 and her most recent bilateral knee CSI was at visit 2024 and she also completed bilateral knee Orthovisc series without improvement 2024. She is interested in repeat CSI today if indicated, as she has been working long hours for the [employer].     Recall from visit on 2025  Nereida is here today for follow up evaluation of her bilateral knee pain. Patient reports her pain is 7/10 today. She completed bilateral knee Orthovisc series 2024 and denies appreciating improvement in her pain following. Her most recent bilateral knee aspiration and CSI was 10/31/2024 with Imer Hanson PA-C. She is interested in having both of her knees drained today as she is not yet eligible for CSI.     Recall from visit on 2024  Nereida is here today for follow up evaluation of her bilateral knee pain. Patient reports her pain is 7/10 today. She most recently had bilateral intra-articular knee Zilretta injections at visit 2024 and admits to appreciating improvement in her pain following. She states she is not feeling as swollen today. She also admits to left ankle swelling over the past 1 week without mechanism of injury.      PAST MEDICAL HISTORY:   Past Medical History:   Diagnosis Date    Allergy     Bilateral chronic knee pain     Morbid obesity with BMI of 45.0-49.9, adult     Osteoarthritis of both knees        PAST SURGICAL HISTORY:   Past Surgical History:   Procedure Laterality Date    ARTHROSCOPIC CHONDROPLASTY OF KNEE JOINT Right 2020    Procedure: ARTHROSCOPY, KNEE, WITH CHONDROPLASTY;  Surgeon: Aiden Clarke DO;  [facility];  Service: Orthopedics;  Laterality: Right;     SECTION      KNEE ARTHROSCOPY W/ MENISCECTOMY Right 2020    Procedure: ARTHROSCOPY, KNEE, WITH  MENISCECTOMY;  Surgeon: Aiden Clarke DO;  Location: [facility] OR;  Service: Orthopedics;  Laterality: Right;  Equipment: Scope; [employer] Tr[employer]par Meniscus Repair System; [place] chondral drill set  Vendor: [employer]; [employer]  Table: Supine    KNEE ARTHROSCOPY W/ PLICA EXCISION Right 9/9/2020    Procedure: EXCISION, PLICA, KNEE, ARTHROSCOPIC;  Surgeon: Aiden Clarke DO;  Location: [facility] OR;  Service: Orthopedics;  Laterality: Right;       FAMILY HISTORY:   Family History   Problem Relation Name Age of Onset    Hypertension Mother      Alcohol abuse Father Yaya         none    Breast cancer Neg Hx      Ovarian cancer Neg Hx         SOCIAL HISTORY:   Social History     Socioeconomic History    Marital status: Single   Tobacco Use    Smoking status: Never    Smokeless tobacco: Never   Substance and Sexual Activity    Alcohol use: No    Drug use: No    Sexual activity: Yes     Partners: Male     Birth control/protection: None     Social Drivers of Health     Financial Resource Strain: Low Risk  (12/11/2023)    Overall Financial Resource Strain (CARDIA)     Difficulty of Paying Living Expenses: Not hard at all   Food Insecurity: No Food Insecurity (12/11/2023)    Hunger Vital Sign     Worried About Running Out of Food in the Last Year: Never true     Ran Out of Food in the Last Year: Never true   Transportation Needs: No Transportation Needs (12/11/2023)    PRAPARE - Transportation     Lack of Transportation (Medical): No     Lack of Transportation (Non-Medical): No   Physical Activity: Sufficiently Active (12/11/2023)    Exercise Vital Sign     Days of Exercise per Week: 5 days     Minutes of Exercise per Session: 30 min   Stress: No Stress Concern Present (12/11/2023)    [employer] - Occupational Stress Questionnaire     Feeling of Stress : Not at all   Housing Stability: Unknown (12/11/2023)    Housing Stability Vital Sign     Unable to Pay for Housing in the Last Year: No     Unstable  Housing in the Last Year: No       MEDICATIONS:     Current Outpatient Medications:     diclofenac sodium (VOLTAREN) 1 % Gel, Apply 2 g topically 4 (four) times daily., Disp: 20 g, Rfl: 2    ibuprofen (ADVIL,MOTRIN) 800 MG tablet, Take 1 tablet (800 mg total) by mouth 2 (two) times daily as needed for Pain., Disp: 90 tablet, Rfl: 0    LIDOcaine-prilocaine (EMLA) cream, Apply topically as needed., Disp: 30 g, Rfl: 5    methocarbamoL (ROBAXIN) 500 MG Tab, Take 1 tablet (500 mg total) by mouth 3 (three) times daily as needed (pain)., Disp: 45 tablet, Rfl: 0    semaglutide, weight loss, (WEGOVY) 1.7 mg/0.75 mL PnIj, Inject 1.7 mg into the skin every 7 days., Disp: 9 mL, Rfl: 0    furosemide (LASIX) 40 MG tablet, Take 1 tablet (40 mg total) by mouth once daily., Disp: 30 tablet, Rfl: 3    gabapentin (NEURONTIN) 100 MG capsule, Take 1 capsule (100 mg total) by mouth 3 (three) times daily., Disp: 90 capsule, Rfl: 6  No current facility-administered medications for this visit.    ALLERGIES:   Review of patient's allergies indicates:   Allergen Reactions    Adhesive      Causes skin tearing and redness         PHYSICAL EXAMINATION:  BP (!) 152/80 (Patient Position: Sitting)   Ht 5' (1.524 m)   Wt 90.6 kg (199 lb 11.8 oz)   BMI 39.01 kg/m²   Vitals signs and nursing note have been reviewed.  General: In no acute distress, well developed, well nourished, no diaphoresis  Eyes: EOM full and smooth, no eye redness or discharge  HENT: normocephalic and atraumatic, neck supple, trachea midline, no nasal discharge, no external ear redness or discharge  Cardiovascular: 2+ and symmetric DP pulses bilaterally, no LE edema  Lungs: respirations non-labored, no conversational dyspnea   Abd: non-distended, no rigidity  MSK: no amputation or deformity, no swelling of extremities  Neuro: AAOx3, CN2-12 grossly intact  Skin: No rashes, warm and dry  Psychiatric: cooperative, pleasant, mood and affect appropriate for age    MUSCULOSKELETAL  EXAM:    BILATERAL KNEE EXAMINATION   Affected side is compared to contralateral knee     Observation:  No edema, erythema, ecchymosis noted. Suprapatellar effusion on right  No muscle atrophy of the thighs and calves noted.  No obvious bony deformities noted.   No genu valgus/varum noted. No recurvatum noted.      Tenderness:  Patella - + bilaterally   Lateral joint line - + bilaterally  Quad tendon - none   Medial joint line - + bilaterally  Patellar tendon - none  Medial plica - none  Tibial tubercle - none   Lateral plica - none  Pes anserine - + b/l   MCL prox - none  Distal ITB - none   MCL distal - none  MFC - none    LCL prox - none  LFC - none    LCL distal - none  Tibia - none    Fibula - none    No obvious bursae, plicae, popliteal cysts, or tendon derangement palpated.          ROM:   Active extension to 0° on left without hyperextension, lag, or patellar J sign.   Active extension to 0° on right without hyperextension, lag, or patellar J sign.   + crepitus with extension bilaterally  Active flexion to 120° on left and 120° on right.    Strength: (bilaterally)  Knee Flexion - 5/5 on left and 5/5 on right  Knee Extension - 5/5 on left and 5/5 on right  Hip Flexion - 5/5 on left and 5/5 on right  Hip Extension - 5/5 on left and 5/5 on right  Ankle dorsiflexion - 5/5 on left and 5/5 on right  Ankle Plantarflexion - 5/5 on left and 5/5 on right    Patellofemoral Exam:  Patellar ballottement - positive on right  Bulge sign - positive on right  Patellar grind - negative on left, positive on right  No patellar laxity with medial and lateral translation   No apprehension with medial and lateral patellar translation.     Ligament Testing:  Lachman's test - negative  No laxity with varus testing at 0 and 30 degrees.  No laxity with valgus testing at 0 and 30 degrees.    Neurovascular Examination:   Antalgic gait  Sensation intact to light touch in the obturator, lateral/intermediate/medial/posterior femoral  "cutaneous, saphenous, and common peroneal nerves bilaterally.  Pulses intact at the DP and PT arteries bilaterally.    Capillary refill intact <2 seconds in all toes bilaterally.    IMAGIN. X-ray obtained on 2022 due to bilateral knee pain  2. X-ray images were reviewed personally by me and then directly with patient.  3. FINDINGS: X-ray images obtained demonstrate tricompartmental degenerative changes, most pronounced in the medial compartment bilaterally.  Kellgren and Clay grade 3  4. IMPRESSION: As above.     1. X-ray obtained 2024 due to bilateral knee pain   2. X-ray images were reviewed personally by me and then directly with patient.  3. FINDINGS: X-ray images obtained demonstrate nonspecific suprapatellar joint effusion on the right and possible suprapatellar joint effusion on the left, tricompartmental degenerative changes, most pronounced at the medial compartment and progressed from . Kellgren and Clay grade 3  4. IMPRESSION: As above.       PROCEDURE:  Large Joint Aspiration/Injection  Knee joint, bilateral  Performed by: PEYMAN MCGUIRE  Authorized by: PEYMAN MCGUIRE  Consent Done?: Yes (Verbal)  Indications: Pain and joint effusion  Site marked: The procedure site was marked   Timeout: Prior to procedure the correct patient, procedure, and site was verified   Location: Knee joint, bilateral  Prep: Patient was prepped with Chlorhexidine and alcohol.  Ultrasound Guidance for needle placement: yes    Procedure RIGHT: Ethyl Chloride spray was used prior to skin puncture. After cold spray was applied, 2-4 cc's of 0.2% ropivacaine was injected into the skin and superficial tissue at the injection site using a 25G, 1.5" needle to form an anesthetic tunnel and ensure proper placement of the needle into the joint space. After local anesthetic was applied an 18G, 1.5 needle was used to enter the knee joint capsule under US guidance. 25 cc of clear synovial fluid was aspirated. " "Following aspiration, a 3 cc mixture of 1 cc of 40 mg/ml triamcinolone acetonide and 2 cc of 0.2% ropivacaine were injected into the knee joint.     Procedure LEFT: Ethyl Chloride spray was used prior to skin puncture. After cold spray was applied, 2-4 cc's of 0.2% ropivacaine was injected into the skin and superficial tissue at the injection site using a 25G, 1.5" needle to form an anesthetic tunnel and ensure proper placement of the needle into the joint space. After local anesthetic was applied an 18G, 1.5 needle was used to enter the knee joint capsule under US guidance. 8 cc of bloody synovial fluid was aspirated. Following aspiration, a 3 cc mixture of 1 cc of 40 mg/ml triamcinolone acetonide and 2 cc of 0.2% ropivacaine were injected into the knee joint.     Approach: superiorlateral  Medications: 40 mg triamcinolone acetonide 40 mg/mL  Patient tolerance: Patient tolerated the procedure well with no immediate complications. Improvement noted after aspiration. Patient was wrapped following bandaging.    Ultrasound guidance was used for needle localization with [employer] 2, 15-6 MHz (L)probe(s). Images were saved and stored for documentation. Short and long axis images of the anterior knee were taken prior to injection confirming presence of joint effusion. The knee joint well visualized. Dynamic visualization of the needles was continuous throughout the procedure and maintained good position and correct needle placement.    Triamcinolone: x2  NDC: [address]-4  LOT: [id number]  EXP: 2026-04       ASSESSMENT:      ICD-10-CM ICD-9-CM   1. Chronic pain of both knees  M25.561 719.46    M25.562 338.29    G89.29    2. Primary osteoarthritis of both knees  M17.0 715.16       PLAN:  1-2.  Chronic bilateral knee pain/osteoarthritis - improved, recent exacerbation    - Nereida is following up today for her chronic bilateral knee pain which has been slowly worsening over several years.  She has also been appreciating " increased swelling and instability which worsens with walking and long periods of standing at work.    - Her most recent bilateral knee aspiration was 01/09/2025 and her most recent bilateral knee CSI was at visit 09/30/2024 and she also completed bilateral knee Orthovisc series without improvement 11/20/2024. She is interested in repeat CSI today if indicated as she is working long hours for the [employer].     - We reviewed the natural history of osteoarthritis and a multipronged treatment approach. We reviewed the importance of addressing three different aspects to best manage this condition. Controlling the intra-articular immune reaction through medications and/or injections, improving local stability through bracing and/or injection, and improving functional stability through hip, core, and ankle strength, stability and mobility which may benefit from formal physical therapy.    - After discussing options she elects to proceed with ultrasound guided bilateral knee aspirations. See above for procedure detail.     - Continue with bilateral medial hinged knee braces during activity.       Future planning includes - possible repeat bilateral intra-articular knee CSI in 3 months (education provided on no surgery x 3 months following CSI), follow up with Dr. Barrios as scheduled    All questions were answered to the best of my ability and all concerns were addressed at this time.    Follow up in 3 months for above.

## 2025-02-13 ENCOUNTER — PATIENT MESSAGE (OUTPATIENT)
Dept: GASTROENTEROLOGY | Facility: CLINIC | Age: 50
End: 2025-02-13
Payer: COMMERCIAL

## 2025-02-13 ENCOUNTER — OFFICE VISIT (OUTPATIENT)
Dept: FAMILY MEDICINE | Facility: CLINIC | Age: 50
End: 2025-02-13
Payer: COMMERCIAL

## 2025-02-13 VITALS
OXYGEN SATURATION: 98 % | HEIGHT: 60 IN | BODY MASS INDEX: 42.33 KG/M2 | RESPIRATION RATE: 12 BRPM | TEMPERATURE: 98 F | WEIGHT: 215.63 LBS | HEART RATE: 75 BPM | SYSTOLIC BLOOD PRESSURE: 130 MMHG | DIASTOLIC BLOOD PRESSURE: 84 MMHG

## 2025-02-13 DIAGNOSIS — J02.9 SORE THROAT: ICD-10-CM

## 2025-02-13 DIAGNOSIS — Z12.39 ENCOUNTER FOR SCREENING FOR MALIGNANT NEOPLASM OF BREAST, UNSPECIFIED SCREENING MODALITY: ICD-10-CM

## 2025-02-13 DIAGNOSIS — M25.561 CHRONIC PAIN OF RIGHT KNEE: Primary | ICD-10-CM

## 2025-02-13 DIAGNOSIS — G89.29 CHRONIC PAIN OF RIGHT KNEE: Primary | ICD-10-CM

## 2025-02-13 DIAGNOSIS — Z12.11 COLON CANCER SCREENING: ICD-10-CM

## 2025-02-13 DIAGNOSIS — E78.5 HYPERLIPIDEMIA, UNSPECIFIED HYPERLIPIDEMIA TYPE: ICD-10-CM

## 2025-02-13 PROCEDURE — 3079F DIAST BP 80-89 MM HG: CPT | Mod: CPTII,S$GLB,,

## 2025-02-13 PROCEDURE — 3075F SYST BP GE 130 - 139MM HG: CPT | Mod: CPTII,S$GLB,,

## 2025-02-13 PROCEDURE — 3008F BODY MASS INDEX DOCD: CPT | Mod: CPTII,S$GLB,,

## 2025-02-13 PROCEDURE — 1159F MED LIST DOCD IN RCRD: CPT | Mod: CPTII,S$GLB,,

## 2025-02-13 PROCEDURE — 99999 PR PBB SHADOW E&M-EST. PATIENT-LVL IV: CPT | Mod: PBBFAC,,,

## 2025-02-13 PROCEDURE — 1160F RVW MEDS BY RX/DR IN RCRD: CPT | Mod: CPTII,S$GLB,,

## 2025-02-13 PROCEDURE — 99214 OFFICE O/P EST MOD 30 MIN: CPT | Mod: S$GLB,,,

## 2025-02-13 RX ORDER — GABAPENTIN 300 MG/1
300 CAPSULE ORAL 3 TIMES DAILY
Qty: 90 CAPSULE | Refills: 2 | Status: SHIPPED | OUTPATIENT
Start: 2025-02-13 | End: 2025-05-14

## 2025-02-13 RX ORDER — FLUTICASONE PROPIONATE 50 MCG
1 SPRAY, SUSPENSION (ML) NASAL DAILY
Qty: 11.1 ML | Refills: 0 | Status: SHIPPED | OUTPATIENT
Start: 2025-02-13

## 2025-02-13 RX ORDER — CETIRIZINE HYDROCHLORIDE 10 MG/1
10 TABLET ORAL DAILY
Qty: 90 TABLET | Refills: 0 | Status: SHIPPED | OUTPATIENT
Start: 2025-02-13 | End: 2025-05-14

## 2025-02-13 NOTE — LETTER
February 13, 2025      [facility]  [address] [place] [address]  Phone: [phone]  Fax: [phone]       Patient: Nereida Narayanan   YOB: 1975  Date of Visit: 02/13/2025    To Whom It May Concern:    Yasmine Narayanan  was at [facility] on 02/13/2025. The patient may return to work/school on 02/14/2025 with no restrictions. If you have any questions or concerns, or if I can be of further assistance, please do not hesitate to contact me.    Sincerely,    Nereida Kelley MA

## 2025-02-13 NOTE — PROGRESS NOTES
"Subjective:       Patient ID:  [id number]     Chief Complaint: Knee Pain and Sore Throat      History of Present Illness    Ms. Narayanan presents today for  sore throat and knee pain.  She reports experiencing itchy throat for the past couple of days with itchy sneezes but denies runny nose. Multiple coworkers at her [place] workplace have been ill. She describes "bone on bone" sensation in her knee with underlying arthritis. Previous treatments including gel injections, ibuprofen, and naproxen have been ineffective. Currently taking gabapentin 100 mg 3 times daily with good effect, reporting it is the only medication providing relief. However, she request an increase. Her symptoms are exacerbated by prolonged standing during 12-16 hour shifts at [employer]. She has an upcoming appt with ortho to discuss          Past Medical History:   Diagnosis Date    Allergy     Bilateral chronic knee pain     Morbid obesity with BMI of 45.0-49.9, adult     Osteoarthritis of both knees       Active Problem List with Overview Notes    Diagnosis Date Noted    Left foot pain 01/29/2023    Osteoarthritis of ankle and foot 01/26/2023    BMI 45.0-49.9, adult 01/06/2023    Osteoarthritis of both knees 12/30/2022    Morbid obesity 05/10/2022    Trichimoniasis 11/30/2021    Weakness 10/07/2020    Knee pain, right 10/07/2020      Review of patient's allergies indicates:   Allergen Reactions    Adhesive      Causes skin tearing and redness          Current Outpatient Medications:     ibuprofen (ADVIL,MOTRIN) 800 MG tablet, Take 1 tablet (800 mg total) by mouth 2 (two) times daily as needed for Pain., Disp: 90 tablet, Rfl: 0    semaglutide, weight loss, (WEGOVY) 1.7 mg/0.75 mL PnIj, Inject 1.7 mg into the skin every 7 days., Disp: 9 mL, Rfl: 0    cetirizine (ZYRTEC) 10 MG tablet, Take 1 tablet (10 mg total) by mouth once daily., Disp: 90 tablet, Rfl: 0    diclofenac sodium (VOLTAREN) 1 % Gel, Apply 2 g topically 4 (four) times daily. " (Patient not taking: Reported on 2/13/2025), Disp: 20 g, Rfl: 2    fluticasone propionate (FLONASE) 50 mcg/actuation nasal spray, 1 spray (50 mcg total) by Each Nostril route once daily., Disp: 11.1 mL, Rfl: 0    furosemide (LASIX) 40 MG tablet, Take 1 tablet (40 mg total) by mouth once daily., Disp: 30 tablet, Rfl: 3    gabapentin (NEURONTIN) 300 MG capsule, Take 1 capsule (300 mg total) by mouth 3 (three) times daily., Disp: 90 capsule, Rfl: 2    LIDOcaine-prilocaine (EMLA) cream, Apply topically as needed. (Patient not taking: Reported on 2/13/2025), Disp: 30 g, Rfl: 5    methocarbamoL (ROBAXIN) 500 MG Tab, Take 1 tablet (500 mg total) by mouth 3 (three) times daily as needed (pain). (Patient not taking: Reported on 2/13/2025), Disp: 45 tablet, Rfl: 0    Lab Results   Component Value Date    WBC 11.37 04/21/2024    HGB 11.7 (L) 04/21/2024    HCT 36.7 (L) 04/21/2024     04/21/2024    CHOL 233 (H) 04/12/2023    TRIG 81 04/12/2023    HDL 63 04/12/2023    ALT 32 08/24/2023    AST 25 08/24/2023     04/21/2024    K 3.9 04/21/2024     04/21/2024    CREATININE 0.6 04/21/2024    BUN 13 04/21/2024    CO2 22 (L) 04/21/2024    TSH 2.890 08/24/2023    INR 1.1 09/03/2020       Review of Systems   Constitutional:  Negative for fatigue and fever.   HENT: Negative.  Negative for congestion, sneezing and sore throat.    Eyes: Negative.    Respiratory:  Negative for cough, shortness of breath and wheezing.    Cardiovascular:  Negative for chest pain, palpitations and leg swelling.   Gastrointestinal:  Negative for abdominal pain, nausea and vomiting.   Genitourinary: Negative.    Musculoskeletal:  Positive for arthralgias.   Skin: Negative.  Negative for rash.   Neurological:  Negative for dizziness, weakness, light-headedness, numbness and headaches.   Hematological: Negative.    Psychiatric/Behavioral: Negative.         Objective:      Physical Exam  Constitutional:       Appearance: Normal appearance.   HENT:       Head: Normocephalic and atraumatic.      Nose: Nose normal.      Mouth/Throat:      Pharynx: Oropharynx is clear.      Tonsils: No tonsillar exudate.   Eyes:      Extraocular Movements: Extraocular movements intact.   Cardiovascular:      Rate and Rhythm: Normal rate and regular rhythm.   Pulmonary:      Effort: Pulmonary effort is normal.      Breath sounds: Normal breath sounds.   Musculoskeletal:         General: Normal range of motion.      Cervical back: Normal range of motion.   Skin:     General: Skin is warm and dry.   Neurological:      General: No focal deficit present.      Mental Status: She is alert and oriented to person, place, and time.   Psychiatric:         Mood and Affect: Mood normal.         Assessment:       1. Chronic pain of right knee    2. Hyperlipidemia, unspecified hyperlipidemia type    3. Encounter for screening for malignant neoplasm of breast, unspecified screening modality    4. Colon cancer screening    5. Sore throat        Plan:       Nereida was seen today for knee pain and sore throat.    Diagnoses and all orders for this visit:    Chronic pain of right knee  -     gabapentin (NEURONTIN) 300 MG capsule; Take 1 capsule (300 mg total) by mouth 3 (three) times daily.  - Evaluated the patient's severe knee pain and difficulty working due to arthritis.  - Noted that previous treatments, including gel injections, have been ineffective.  - Ms. Narayanan describes the condition as 'bone on bone'.  - Noted that the patient reports current pain medication is ineffective for managing knee pain.  - Increased gabapentin dosage for pain management instead of opiates.  - Advised the patient to monitor and report any changes in pain levels with the new gabapentin dosage.      Hyperlipidemia, unspecified hyperlipidemia type  -     Comprehensive Metabolic Panel; Future  -     Hemoglobin A1C; Future  -     LIPID PANEL; Future    Encounter for screening for malignant neoplasm of breast,  unspecified screening modality  -     Mammo Digital Screening Bilat w/ Kermit (XPD); Future    Colon cancer screening  -     Case Request Endoscopy: COLONOSCOPY    Sore throat  -     fluticasone propionate (FLONASE) 50 mcg/actuation nasal spray; 1 spray (50 mcg total) by Each Nostril route once daily.  -     cetirizine (ZYRTEC) 10 MG tablet; Take 1 tablet (10 mg total) by mouth once daily.  - Evaluated the patient's itchy throat symptoms lasting for the past 2 days, accompanied by sneezing but no rhinorrhea, cough, fever, chills, or myalgia.  - Performed physical exam of the throat, which showed no signs of inflammation.  - Assessed the symptoms as potentially due to allergies.  - Prescribed Zyrtec and Flonase for daily use.           FOLLOW UP:  - Follow up in 6 months with Dr. Mckeon.  - Follow up with provider about lab and screening test results.        Future Appointments       Date Provider Specialty Appt Notes    2/27/2025 Kristopher Barrios MD Orthopedics [place] knee pain    3/27/2025 Albina Lawson MD Bariatrics [facility]               Portions of this note were dictated using voice recognition software and may contain dictation related errors in spelling / grammar / syntax not discovered on text review.     Angela Douglas PA-C

## 2025-02-24 DIAGNOSIS — E66.01 CLASS 3 SEVERE OBESITY DUE TO EXCESS CALORIES WITH SERIOUS COMORBIDITY AND BODY MASS INDEX (BMI) OF 40.0 TO 44.9 IN ADULT: ICD-10-CM

## 2025-02-24 DIAGNOSIS — E66.813 CLASS 3 SEVERE OBESITY DUE TO EXCESS CALORIES WITH SERIOUS COMORBIDITY AND BODY MASS INDEX (BMI) OF 40.0 TO 44.9 IN ADULT: ICD-10-CM

## 2025-02-24 RX ORDER — SEMAGLUTIDE 1.7 MG/.75ML
1.7 INJECTION, SOLUTION SUBCUTANEOUS
Qty: 9 ML | Refills: 0 | Status: CANCELLED | OUTPATIENT
Start: 2025-02-24

## 2025-02-26 DIAGNOSIS — E66.813 CLASS 3 SEVERE OBESITY DUE TO EXCESS CALORIES WITH SERIOUS COMORBIDITY AND BODY MASS INDEX (BMI) OF 40.0 TO 44.9 IN ADULT: ICD-10-CM

## 2025-02-26 DIAGNOSIS — E66.01 CLASS 3 SEVERE OBESITY DUE TO EXCESS CALORIES WITH SERIOUS COMORBIDITY AND BODY MASS INDEX (BMI) OF 40.0 TO 44.9 IN ADULT: ICD-10-CM

## 2025-02-27 ENCOUNTER — OFFICE VISIT (OUTPATIENT)
Dept: ORTHOPEDICS | Facility: CLINIC | Age: 50
End: 2025-02-27
Payer: COMMERCIAL

## 2025-02-27 ENCOUNTER — PATIENT MESSAGE (OUTPATIENT)
Dept: SPORTS MEDICINE | Facility: CLINIC | Age: 50
End: 2025-02-27
Payer: COMMERCIAL

## 2025-02-27 ENCOUNTER — HOSPITAL ENCOUNTER (OUTPATIENT)
Dept: RADIOLOGY | Facility: HOSPITAL | Age: 50
Discharge: HOME | End: 2025-02-27
Payer: COMMERCIAL

## 2025-02-27 VITALS — WEIGHT: 215.63 LBS | HEIGHT: 60 IN | BODY MASS INDEX: 42.33 KG/M2

## 2025-02-27 DIAGNOSIS — Z12.39 ENCOUNTER FOR SCREENING FOR MALIGNANT NEOPLASM OF BREAST, UNSPECIFIED SCREENING MODALITY: ICD-10-CM

## 2025-02-27 DIAGNOSIS — M17.0 PRIMARY OSTEOARTHRITIS OF BOTH KNEES: Primary | ICD-10-CM

## 2025-02-27 PROCEDURE — 77063 BREAST TOMOSYNTHESIS BI: CPT | Mod: 26,,, | Performed by: RADIOLOGY

## 2025-02-27 PROCEDURE — 77067 SCR MAMMO BI INCL CAD: CPT | Mod: 26,,, | Performed by: RADIOLOGY

## 2025-02-27 PROCEDURE — 77063 BREAST TOMOSYNTHESIS BI: CPT | Mod: TC

## 2025-02-27 PROCEDURE — 99999 PR PBB SHADOW E&M-EST. PATIENT-LVL III: CPT | Mod: PBBFAC,,, | Performed by: ORTHOPAEDIC SURGERY

## 2025-02-27 NOTE — PROGRESS NOTES
Subjective:      Patient ID: Nereida Narayanan is a 49 y.o. female.    Chief Complaint: Pain of the Right Knee and Pain of the Left Knee    HPI     They have experienced problems with their bilateral knee over the past several years. The patient denies relevant history of injury/aggravation.  The right side is reportedly more symptomatic.  Pain is located medially and  anteriorly Associated symptoms include swelling, giving way, pseudolocking, and gelling.  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 They have been treated with right knee arthroscopy, NSAIDs, gabapentin, viscosupplementation and CSI.  The patient notes meaningful relief with CSI gabapentin.   Symptoms have recently improved. Ambulation reportedly has somewhat been impaired. Self care ADLs are not painful.     Review of Systems   Constitutional: Negative for fever and weight loss.   HENT:  Negative for congestion.    Eyes:  Negative for visual disturbance.   Cardiovascular:  Negative for chest pain.   Respiratory:  Negative for shortness of breath.    Hematologic/Lymphatic: Negative for bleeding problem. Does not bruise/bleed easily.   Skin:  Negative for poor wound healing.   Musculoskeletal:  Positive for joint pain and joint swelling.   Gastrointestinal:  Negative for abdominal pain.   Genitourinary:  Negative for dysuria.   Neurological:  Negative for seizures.   Psychiatric/Behavioral:  Negative for altered mental status.    Allergic/Immunologic: Negative for persistent infections.         Objective:      Ortho/SPM Exam      Right knee    [unfilled]    The patient is not in acute distress.   Sclerae normal  Body habitus is obese.  Respiratory distress:  none   The patient walks without a limp.  Hip irritability  negative.   The skin over the knee is intact.  Knee effusion 1+  Palpation- nontender  Range of motion- 0-135  deg,   Ligament laxity exam:  2+ valgus laxity  Popliteal cyst negative  Patellar  crepitation absent.  Flexion/pinch negative  Pulses DP present, PT present.  Motor normal 5/5 strength in all tested muscle groups.   Sensory normal.    Left knee  Trace effusion  Full range of motion  1+ valgus laxity      IMAGING- radiographs of both knees show advanced medial narrowing with sclerosis and osteophytes, Kellgren stage III        Assessment:       Encounter Diagnosis   Name Primary?    Primary osteoarthritis of both knees Yes             the condition is structurally significant and aggravated by obesity.    The patient is shows moderate functional impairment and does not appear to have severe pain at this time.  Gradual progression is likely.    Given the patient's age, palliative treatment is appropriate as long as there is reasonable pain control and preservation of mobility.      Plan:       Nereida was seen today for pain and pain.    Diagnoses and all orders for this visit:    Primary osteoarthritis of both knees              I explained my diagnostic impression and the reasoning behind it in detail, using layman's terms.  Models and/or pictures were used to help in the explanation.    Weight loss recommended    The patient was started on my palliative protocol at this time.  She is due for another CSI in 2 months.

## 2025-02-28 ENCOUNTER — RESULTS FOLLOW-UP (OUTPATIENT)
Dept: FAMILY MEDICINE | Facility: CLINIC | Age: 50
End: 2025-02-28

## 2025-02-28 DIAGNOSIS — R74.8 ELEVATED LIVER ENZYMES: Primary | ICD-10-CM

## 2025-03-06 ENCOUNTER — PATIENT MESSAGE (OUTPATIENT)
Dept: BARIATRICS | Facility: CLINIC | Age: 50
End: 2025-03-06
Payer: COMMERCIAL

## 2025-03-07 RX ORDER — SEMAGLUTIDE 1.7 MG/.75ML
1.7 INJECTION, SOLUTION SUBCUTANEOUS
Qty: 9 ML | Refills: 0 | Status: SHIPPED | OUTPATIENT
Start: 2025-03-07

## 2025-03-21 ENCOUNTER — OFFICE VISIT (OUTPATIENT)
Dept: FAMILY MEDICINE | Facility: CLINIC | Age: 50
End: 2025-03-21
Payer: COMMERCIAL

## 2025-03-21 VITALS
SYSTOLIC BLOOD PRESSURE: 128 MMHG | TEMPERATURE: 98 F | WEIGHT: 198.63 LBS | HEIGHT: 60 IN | OXYGEN SATURATION: 99 % | HEART RATE: 70 BPM | BODY MASS INDEX: 39 KG/M2 | DIASTOLIC BLOOD PRESSURE: 76 MMHG

## 2025-03-21 DIAGNOSIS — E53.8 VITAMIN B12 DEFICIENCY: ICD-10-CM

## 2025-03-21 DIAGNOSIS — J02.9 SORE THROAT: Primary | ICD-10-CM

## 2025-03-21 LAB
CTP QC/QA: YES
CTP QC/QA: YES
POC MOLECULAR INFLUENZA A AGN: NEGATIVE
POC MOLECULAR INFLUENZA B AGN: NEGATIVE
S PYO RRNA THROAT QL PROBE: NEGATIVE

## 2025-03-21 PROCEDURE — 3078F DIAST BP <80 MM HG: CPT | Mod: CPTII,S$GLB,, | Performed by: FAMILY MEDICINE

## 2025-03-21 PROCEDURE — 87502 INFLUENZA DNA AMP PROBE: CPT | Mod: QW,S$GLB,, | Performed by: FAMILY MEDICINE

## 2025-03-21 PROCEDURE — 3074F SYST BP LT 130 MM HG: CPT | Mod: CPTII,S$GLB,, | Performed by: FAMILY MEDICINE

## 2025-03-21 PROCEDURE — 3044F HG A1C LEVEL LT 7.0%: CPT | Mod: CPTII,S$GLB,, | Performed by: FAMILY MEDICINE

## 2025-03-21 PROCEDURE — 1160F RVW MEDS BY RX/DR IN RCRD: CPT | Mod: CPTII,S$GLB,, | Performed by: FAMILY MEDICINE

## 2025-03-21 PROCEDURE — 99213 OFFICE O/P EST LOW 20 MIN: CPT | Mod: 25,S$GLB,, | Performed by: FAMILY MEDICINE

## 2025-03-21 PROCEDURE — 1159F MED LIST DOCD IN RCRD: CPT | Mod: CPTII,S$GLB,, | Performed by: FAMILY MEDICINE

## 2025-03-21 PROCEDURE — 99999 PR PBB SHADOW E&M-EST. PATIENT-LVL IV: CPT | Mod: PBBFAC,,, | Performed by: FAMILY MEDICINE

## 2025-03-21 PROCEDURE — 3008F BODY MASS INDEX DOCD: CPT | Mod: CPTII,S$GLB,, | Performed by: FAMILY MEDICINE

## 2025-03-21 PROCEDURE — 87880 STREP A ASSAY W/OPTIC: CPT | Mod: QW,S$GLB,, | Performed by: FAMILY MEDICINE

## 2025-03-21 RX ORDER — AZITHROMYCIN 250 MG/1
TABLET, FILM COATED ORAL
Qty: 6 TABLET | Refills: 0 | Status: SHIPPED | OUTPATIENT
Start: 2025-03-21 | End: 2025-03-26

## 2025-03-21 NOTE — PROGRESS NOTES
SCRIBE #1 NOTE: I, Pernell Malone, am scribing for, and in the presence of, Jitendra Valadez III, MD. I have scribed the entire note.     Subjective:       Patient ID: Nereida Narayanan is a 49 y.o. female.    Chief Complaint: Sore Throat, Nausea, and Emesis    Ms. Nereida Narayanan is here for a complaint of sore throat, nausea, and emesis after her last appointment on 8/24/2023. The patient reports experiencing a sore throat about 2 days ago. She denies any fevers to her knowledge as well as chills, coughs or sweats. The patient notes a couple episodes of emesis. She mentions having a coworker who is sick with the flu. The patient does not have any sick children at home. She has also inquired about getting on B12. BMI of 38.79. Cardiovascular good. No chest pain. No palpitations. Blood pressure is 128/76. Hypertension controlled. Hyperlipidemia. No hx of asthma.     Review of Systems   Constitutional:  Negative for chills, diaphoresis and fever.   HENT:  Positive for sore throat. Negative for congestion.    Eyes:  Negative for visual disturbance.   Respiratory:  Negative for cough, chest tightness and shortness of breath.    Cardiovascular:  Negative for chest pain.   Gastrointestinal:  Positive for nausea and vomiting.   Endocrine: Negative for polydipsia and polyuria.   Genitourinary:  Negative for dysuria and flank pain.   Musculoskeletal:  Negative for back pain, neck pain and neck stiffness.   Skin:  Negative for rash.   Neurological:  Negative for weakness.   Hematological:  Does not bruise/bleed easily.   Psychiatric/Behavioral:  Negative for behavioral problems.    All other systems reviewed and are negative.      Objective:      Physical examination: Vital signs noted. No acute distress. No carotid bruit. Regular heart rate and rhythm. Lungs clear to auscultation bilaterally. Abdomen bowel sounds positive soft and nontender. Extremities without edema. 2+ pedal pulses.      Assessment:       1. Sore throat    2.  Vitamin B12 deficiency    3. BMI 38.0-38.9,adult        Plan:       Sore throat  -     POCT Influenza A/B Molecular  -     POCT rapid strep A    Vitamin B12 deficiency  -     Vitamin B12; Future; Expected date: 03/21/2025    BMI 38.0-38.9,adult    Check B12 level.  Zithromax Z-Grady.  All care gaps addressed or discussed.     I, Jitendra Valadez III, MD, personally performed the services described in this documentation. All medical record entries made by the scribe were at my direction and in my presence. I have reviewed the chart and agree that the record reflects my personal performance and is accurate and complete.

## 2025-03-24 ENCOUNTER — RESULTS FOLLOW-UP (OUTPATIENT)
Dept: FAMILY MEDICINE | Facility: CLINIC | Age: 50
End: 2025-03-24

## 2025-03-24 ENCOUNTER — LAB VISIT (OUTPATIENT)
Dept: LAB | Facility: HOSPITAL | Age: 50
End: 2025-03-24
Payer: COMMERCIAL

## 2025-03-24 DIAGNOSIS — E53.8 VITAMIN B12 DEFICIENCY: ICD-10-CM

## 2025-03-24 DIAGNOSIS — R74.8 ELEVATED LIVER ENZYMES: ICD-10-CM

## 2025-03-24 DIAGNOSIS — R73.03 PREDIABETES: ICD-10-CM

## 2025-03-24 LAB
ALBUMIN SERPL-MCNC: 3.3 G/DL (ref 3.5–5.2)
ALP SERPL-CCNC: 56 UNIT/L (ref 40–150)
ALT SERPL-CCNC: 12 UNIT/L (ref 10–44)
ANION GAP (SMH): 7 MMOL/L (ref 8–16)
AST SERPL-CCNC: 22 UNIT/L (ref 11–45)
BILIRUB SERPL-MCNC: 0.3 MG/DL (ref 0.1–1)
BUN SERPL-MCNC: 19 MG/DL (ref 6–20)
CALCIUM SERPL-MCNC: 9.2 MG/DL (ref 8.7–10.5)
CHLORIDE SERPL-SCNC: 107 MMOL/L (ref 95–110)
CO2 SERPL-SCNC: 24 MMOL/L (ref 23–29)
CREAT SERPL-MCNC: 0.6 MG/DL (ref 0.5–1.4)
EAG (OHS): 94 MG/DL (ref 68–131)
GFR SERPLBLD CREATININE-BSD FMLA CKD-EPI: >60 ML/MIN/1.73/M2
GLUCOSE SERPL-MCNC: 86 MG/DL (ref 70–110)
HBA1C MFR BLD: 4.9 % (ref 4–5.6)
POTASSIUM SERPL-SCNC: 3.9 MMOL/L (ref 3.5–5.1)
PROT SERPL-MCNC: 6.5 GM/DL (ref 6–8.4)
SODIUM SERPL-SCNC: 138 MMOL/L (ref 136–145)
VIT B12 SERPL-MCNC: 228 PG/ML (ref 210–950)

## 2025-03-24 PROCEDURE — 36415 COLL VENOUS BLD VENIPUNCTURE: CPT | Mod: PO

## 2025-03-24 PROCEDURE — 80053 COMPREHEN METABOLIC PANEL: CPT

## 2025-03-24 PROCEDURE — 82607 VITAMIN B-12: CPT

## 2025-03-24 PROCEDURE — 83036 HEMOGLOBIN GLYCOSYLATED A1C: CPT

## 2025-03-25 ENCOUNTER — TELEPHONE (OUTPATIENT)
Dept: BARIATRICS | Facility: CLINIC | Age: 50
End: 2025-03-25
Payer: COMMERCIAL

## 2025-03-25 ENCOUNTER — PATIENT MESSAGE (OUTPATIENT)
Dept: BARIATRICS | Facility: CLINIC | Age: 50
End: 2025-03-25
Payer: COMMERCIAL

## 2025-03-25 ENCOUNTER — RESULTS FOLLOW-UP (OUTPATIENT)
Dept: FAMILY MEDICINE | Facility: CLINIC | Age: 50
End: 2025-03-25
Payer: COMMERCIAL

## 2025-03-25 RX ORDER — MELOXICAM 15 MG/1
15 TABLET ORAL
COMMUNITY
Start: 2025-02-15 | End: 2025-03-25 | Stop reason: SDUPTHER

## 2025-03-25 RX ORDER — MELOXICAM 15 MG/1
15 TABLET ORAL DAILY
Qty: 90 TABLET | Refills: 0 | Status: SHIPPED | OUTPATIENT
Start: 2025-03-25 | End: 2025-06-23

## 2025-03-25 NOTE — TELEPHONE ENCOUNTER
Ms Nereida Narayanan MRN [id number] is on the [facility] work queue for a follow up appt with Dr Lawson on 3/27/25 at 420 pm.   Pt is established with Dr. Lawson ; but, appears she has new insurance as of 1/2025.   I spoke with the patient and scheduled her for a financial apptointment on 3/27 at 9 am block and informed her  that we needed to check her benefits prior to her appt that afternoon.  Email sent to finance/pre-service team to check her benefits for medical wt loss and call pt.  Patient will also call her insurance to verify benefits on her end.  Pt verbalized understanding that 9am appt is a telephone only call and that it is a block from 9-12.

## 2025-03-27 ENCOUNTER — TELEPHONE (OUTPATIENT)
Dept: BARIATRICS | Facility: CLINIC | Age: 50
End: 2025-03-27
Payer: COMMERCIAL

## 2025-03-27 RX ORDER — CYANOCOBALAMIN 1000 UG/ML
1000 INJECTION, SOLUTION INTRAMUSCULAR; SUBCUTANEOUS
Qty: 1 ML | Refills: 3 | OUTPATIENT
Start: 2025-03-27

## 2025-03-27 NOTE — TELEPHONE ENCOUNTER
----- Message from Dionne sent at 3/27/2025  9:59 AM CDT -----  Regarding: Rescheduling Request  Contact: [phone]  Rescheduling RequestAppt Type:  EpDate/Time Preference: Next AvailableCaller Name:PtContact Preference: [phone] Comment: Pt would like to r/s appt , and she would also be self pay for her next appt with providerPlease Call pt to Advise,Thank you.

## 2025-03-27 NOTE — TELEPHONE ENCOUNTER
Received message from Chelle with the finance team that [facility] only covers the Bariatric Surgery and not Medical Weight Loss and that [facility] is Out of Network with Stu Corbett notified the patient of her benefit exclusion.  patient informe Chelle that she would like to cont. to see Dr. Lawson and pay the self-pay cost of $99 since .  I called patient and verified that she was aware that the self pay price was the cost of the visit and that the medications would be an additional fee and may not be covered.  Patient verbalized understanding and wishesto proceed.  Patient requested appt be rescheduled to 4/17; appt rescheduled.  Pt aware of date/time/location of appt and to arrive 15-20 minutes prior to scheduled appt time.

## 2025-03-28 ENCOUNTER — PATIENT MESSAGE (OUTPATIENT)
Dept: FAMILY MEDICINE | Facility: CLINIC | Age: 50
End: 2025-03-28
Payer: COMMERCIAL

## 2025-03-31 RX ORDER — CYANOCOBALAMIN 1000 UG/ML
1000 INJECTION, SOLUTION INTRAMUSCULAR; SUBCUTANEOUS
Qty: 1 ML | Refills: 4 | Status: SHIPPED | OUTPATIENT
Start: 2025-03-31

## 2025-04-22 NOTE — PROGRESS NOTES
CC: bilateral knee pain    Nereida is here today for follow up evaluation of her bilateral knee pain. Patient reports her pain is 8/10 today. Her most recent bilateral knee CSI was at visit 02/06/2025 and she admits to 1.5 months of improvement in her pain following. She has maintained compliance with her HEP as tolerated. She notes increased pain with walking long distances and stairs. She also states she has been taking ibuprofen less frequently and is pleased with this.     Recall from visit on 02/06/2025  Nereida is here today for follow up evaluation of her bilateral knee pain. Patient reports her pain is 8/10 today. Her most recent bilateral knee aspiration was 01/09/2025 and her most recent bilateral knee CSI was at visit 09/30/2024 and she also completed bilateral knee Orthovisc series without improvement 11/20/2024. She is interested in repeat CSI today if indicated, as she has been working long hours for the [employer].     Recall from visit on 01/09/2025  Nereida is here today for follow up evaluation of her bilateral knee pain. Patient reports her pain is 7/10 today. She completed bilateral knee Orthovisc series 11/20/2024 and denies appreciating improvement in her pain following. Her most recent bilateral knee aspiration and CSI was 10/31/2024 with Imer Hanson PA-C. She is interested in having both of her knees drained today as she is not yet eligible for CSI.     Recall from visit on 09/30/2024  Nereida is here today for follow up evaluation of her bilateral knee pain. Patient reports her pain is 7/10 today. She most recently had bilateral intra-articular knee Zilretta injections at visit 07/26/2024 and admits to appreciating improvement in her pain following. She states she is not feeling as swollen today. She also admits to left ankle swelling over the past 1 week without mechanism of injury.      PAST MEDICAL HISTORY:   Past Medical History:   Diagnosis Date    Allergy     Bilateral chronic knee  pain     Morbid obesity with BMI of 45.0-49.9, adult     Osteoarthritis of both knees        PAST SURGICAL HISTORY:   Past Surgical History:   Procedure Laterality Date    ARTHROSCOPIC CHONDROPLASTY OF KNEE JOINT Right 2020    Procedure: ARTHROSCOPY, KNEE, WITH CHONDROPLASTY;  Surgeon: Aiden Clarke DO;  Location: [facility] OR;  Service: Orthopedics;  Laterality: Right;     SECTION      KNEE ARTHROSCOPY W/ MENISCECTOMY Right 2020    Procedure: ARTHROSCOPY, KNEE, WITH MENISCECTOMY;  Surgeon: Aiden Clarke DO;  Location: [facility] OR;  Service: Orthopedics;  Laterality: Right;  Equipment: Scope; [employer] Tr[employer]par Meniscus Repair System; Maria Eugenia chondral drill set  Vendor: [employer]; Maria Eugenia  Table: Supine    KNEE ARTHROSCOPY W/ PLICA EXCISION Right 2020    Procedure: EXCISION, PLICA, KNEE, ARTHROSCOPIC;  Surgeon: Aiden Clarke DO;  Location: [facility] OR;  Service: Orthopedics;  Laterality: Right;       FAMILY HISTORY:   Family History   Problem Relation Name Age of Onset    Hypertension Mother      Alcohol abuse Father Yaay         none    Breast cancer Neg Hx      Ovarian cancer Neg Hx         SOCIAL HISTORY:   Social History     Socioeconomic History    Marital status: Single   Tobacco Use    Smoking status: Never    Smokeless tobacco: Never   Substance and Sexual Activity    Alcohol use: No    Drug use: No    Sexual activity: Yes     Partners: Male     Birth control/protection: None     Social Drivers of Health     Financial Resource Strain: Low Risk  (3/27/2025)    Overall Financial Resource Strain (CARDIA)     Difficulty of Paying Living Expenses: Not hard at all   Food Insecurity: No Food Insecurity (3/27/2025)    Hunger Vital Sign     Worried About Running Out of Food in the Last Year: Never true     Ran Out of Food in the Last Year: Never true   Transportation Needs: No Transportation Needs (3/27/2025)    PRAPARE - Transportation     Lack of Transportation (Medical): No     Lack of Transportation  (Non-Medical): No   Physical Activity: Sufficiently Active (3/27/2025)    Exercise Vital Sign     Days of Exercise per Week: 5 days     Minutes of Exercise per Session: 30 min   Stress: No Stress Concern Present (3/27/2025)    [employer] - Occupational Stress Questionnaire     Feeling of Stress : Not at all   Housing Stability: Low Risk  (3/27/2025)    Housing Stability Vital Sign     Unable to Pay for Housing in the Last Year: No     Homeless in the Last Year: No       MEDICATIONS:     Current Outpatient Medications:     cetirizine (ZYRTEC) 10 MG tablet, Take 1 tablet (10 mg total) by mouth once daily., Disp: 90 tablet, Rfl: 0    cyanocobalamin 1,000 mcg/mL injection, Inject 1 mL (1,000 mcg total) into the skin every 28 days., Disp: 1 mL, Rfl: 4    diclofenac sodium (VOLTAREN) 1 % Gel, Apply 2 g topically 4 (four) times daily., Disp: 20 g, Rfl: 2    fluticasone propionate (FLONASE) 50 mcg/actuation nasal spray, 1 spray (50 mcg total) by Each Nostril route once daily., Disp: 11.1 mL, Rfl: 0    gabapentin (NEURONTIN) 300 MG capsule, Take 1 capsule (300 mg total) by mouth 3 (three) times daily., Disp: 90 capsule, Rfl: 2    ibuprofen (ADVIL,MOTRIN) 800 MG tablet, Take 1 tablet (800 mg total) by mouth 2 (two) times daily as needed for Pain., Disp: 90 tablet, Rfl: 0    LIDOcaine-prilocaine (EMLA) cream, Apply topically as needed., Disp: 30 g, Rfl: 5    meloxicam (MOBIC) 15 MG tablet, Take 1 tablet (15 mg total) by mouth once daily., Disp: 90 tablet, Rfl: 0    methocarbamoL (ROBAXIN) 500 MG Tab, Take 1 tablet (500 mg total) by mouth 3 (three) times daily as needed (pain)., Disp: 45 tablet, Rfl: 0    semaglutide, weight loss, (WEGOVY) 1.7 mg/0.75 mL PnIj, Inject 1.7 mg into the skin every 7 days., Disp: 9 mL, Rfl: 0    furosemide (LASIX) 40 MG tablet, Take 1 tablet (40 mg total) by mouth once daily., Disp: 30 tablet, Rfl: 3  No current facility-administered medications for this  visit.    ALLERGIES:   Review of patient's allergies indicates:   Allergen Reactions    Adhesive      Causes skin tearing and redness         PHYSICAL EXAMINATION:  /82 (Patient Position: Sitting)   Ht 5' (1.524 m)   Wt 90.1 kg (198 lb 10.2 oz)   BMI 38.79 kg/m²   Vitals signs and nursing note have been reviewed.  General: In no acute distress, well developed, well nourished, no diaphoresis  Eyes: EOM full and smooth, no eye redness or discharge  HENT: normocephalic and atraumatic, neck supple, trachea midline, no nasal discharge, no external ear redness or discharge  Cardiovascular: 2+ and symmetric DP pulses bilaterally, no LE edema  Lungs: respirations non-labored, no conversational dyspnea   Abd: non-distended, no rigidity  MSK: no amputation or deformity, no swelling of extremities  Neuro: AAOx3, CN2-12 grossly intact  Skin: No rashes, warm and dry  Psychiatric: cooperative, pleasant, mood and affect appropriate for age    MUSCULOSKELETAL EXAM:    BILATERAL KNEE EXAMINATION   Affected side is compared to contralateral knee     Observation:  No edema, erythema, ecchymosis noted. Suprapatellar effusion on right  No muscle atrophy of the thighs and calves noted.  No obvious bony deformities noted.   No genu valgus/varum noted. No recurvatum noted.      Tenderness:  Patella - + bilaterally   Lateral joint line - + bilaterally  Quad tendon - none   Medial joint line - + bilaterally  Patellar tendon - none  Medial plica - none  Tibial tubercle - none   Lateral plica - none  Pes anserine - + b/l   MCL prox - none  Distal ITB - none   MCL distal - none  MFC - none    LCL prox - none  LFC - none    LCL distal - none  Tibia - none    Fibula - none    No obvious bursae, plicae, popliteal cysts, or tendon derangement palpated.          ROM:   Active extension to 0° on left without hyperextension, lag, or patellar J sign.   Active extension to 0° on right without hyperextension, lag, or patellar J sign.   +  crepitus with extension bilaterally  Active flexion to 120° on left and 120° on right.    Strength: (bilaterally)  Knee Flexion - 5/5 on left and 5/5 on right  Knee Extension - 5/5 on left and 5/5 on right  Hip Flexion - 5/5 on left and 5/5 on right  Hip Extension - 5/5 on left and 5/5 on right  Ankle dorsiflexion - 5/5 on left and 5/5 on right  Ankle Plantarflexion - 5/5 on left and 5/5 on right    Patellofemoral Exam:  Patellar ballottement - positive on right  Bulge sign - positive on right  Patellar grind - negative on left, positive on right  No patellar laxity with medial and lateral translation   No apprehension with medial and lateral patellar translation.     Ligament Testing:  Lachman's test - negative  No laxity with varus testing at 0 and 30 degrees.  No laxity with valgus testing at 0 and 30 degrees.    Neurovascular Examination:   Antalgic gait  Sensation intact to light touch in the obturator, lateral/intermediate/medial/posterior femoral cutaneous, saphenous, and common peroneal nerves bilaterally.  Pulses intact at the DP and PT arteries bilaterally.    Capillary refill intact <2 seconds in all toes bilaterally.    IMAGIN. X-ray obtained on 2022 due to bilateral knee pain  2. X-ray images were reviewed personally by me and then directly with patient.  3. FINDINGS: X-ray images obtained demonstrate tricompartmental degenerative changes, most pronounced in the medial compartment bilaterally.  Kellgren and Clay grade 3  4. IMPRESSION: As above.     1. X-ray obtained 2024 due to bilateral knee pain   2. X-ray images were reviewed personally by me and then directly with patient.  3. FINDINGS: X-ray images obtained demonstrate nonspecific suprapatellar joint effusion on the right and possible suprapatellar joint effusion on the left, tricompartmental degenerative changes, most pronounced at the medial compartment and progressed from . Kellgren and Clay grade 3  4. IMPRESSION:  "As above.       PROCEDURE:  Large Joint Aspiration/Injection  Knee joint, bilateral  Performed by: PEYMAN MCGUIRE  Authorized by: PEYMAN MCGUIRE  Consent Done?: Yes (Verbal)  Indications: Pain and joint effusion  Site marked: The procedure site was marked   Timeout: Prior to procedure the correct patient, procedure, and site was verified   Location: Knee joint, bilateral  Prep: Patient was prepped with Chlorhexidine and alcohol.  Ultrasound Guidance for needle placement: yes    Procedure RIGHT: Ethyl Chloride spray was used prior to skin puncture. After cold spray was applied, 2-4 cc's of 0.2% ropivacaine was injected into the skin and superficial tissue at the injection site using a 25G, 1.5" needle to form an anesthetic tunnel and ensure proper placement of the needle into the joint space. After local anesthetic was applied an 18G, 1.5 needle was used to enter the knee joint capsule under US guidance. 28 cc of clear synovial fluid was aspirated. Following aspiration, a 3 cc mixture of 1 cc of 40 mg/ml triamcinolone acetonide and 2 cc of 0.2% ropivacaine were injected into the knee joint.     Procedure LEFT: Ethyl Chloride spray was used prior to skin puncture. After cold spray was applied, a 25G, 1.5" needle was used to enter the joint space and a 3 cc mixture of 1 cc of 40 mg/ml triamcinolone acetonide and 2 cc of 0.2% ropivacaine were injected into the knee joint.     Approach: superiorlateral  Medications: 40 mg triamcinolone acetonide 40 mg/mL  Patient tolerance: Patient tolerated the procedure well with no immediate complications. Improvement noted after aspiration. Patient was wrapped following bandaging.    Ultrasound guidance was used for needle localization with SonRealey[employer] Edge 2, 15-6 MHz (L)probe(s). Images were saved and stored for documentation. Short and long axis images of the anterior knee were taken prior to injection confirming presence of joint effusion. The knee joint well visualized. " Dynamic visualization of the needles was continuous throughout the procedure and maintained good position and correct needle placement.    Triamcinolone: x2  NDC: [address]-6  LOT: [id number]  EXP: 2026-04      ASSESSMENT:      ICD-10-CM ICD-9-CM   1. Chronic pain of both knees  M25.561 719.46    M25.562 338.29    G89.29    2. Primary osteoarthritis of both knees  M17.0 715.16         PLAN:  1-2.  Chronic bilateral knee pain/osteoarthritis - improved, recent exacerbation    - Nereida is following up today for her chronic bilateral knee pain which has been slowly worsening over several years.  She has also been appreciating increased swelling and instability which worsens with walking and long periods of standing at work.    - Her most recent bilateral knee CSI was at visit 01/09/2025 and she also completed bilateral knee Orthovisc series without improvement 11/20/2024. She is interested in repeat CSI today if indicated. She has been taking ibuprofen less often and is pleased with this.     - We reviewed the natural history of osteoarthritis and a multipronged treatment approach. We reviewed the importance of addressing three different aspects to best manage this condition. Controlling the intra-articular immune reaction through medications and/or injections, improving local stability through bracing and/or injection, and improving functional stability through hip, core, and ankle strength, stability and mobility which may benefit from formal physical therapy.    - After discussing options she elects to proceed with ultrasound guided bilateral knee aspirations. See above for procedure detail. She is 7 days early, but the risks were discussed and out of convenience for her work schedule she understands the risks and would still like to proceed with the injections today.    - Continue with bilateral medial hinged knee braces during activity.       Future planning includes - possible repeat bilateral intra-articular knee  CSI in 3 months as she was told to try to get to 52 years old prior to any surgical intervention, repeat VSI    All questions were answered to the best of my ability and all concerns were addressed at this time.    Follow up in 3 months for above.

## 2025-04-23 ENCOUNTER — PATIENT MESSAGE (OUTPATIENT)
Dept: BARIATRICS | Facility: CLINIC | Age: 50
End: 2025-04-23
Payer: COMMERCIAL

## 2025-04-23 ENCOUNTER — TELEPHONE (OUTPATIENT)
Dept: BARIATRICS | Facility: CLINIC | Age: 50
End: 2025-04-23
Payer: COMMERCIAL

## 2025-04-23 NOTE — TELEPHONE ENCOUNTER
----- Message from Chano sent at 4/23/2025  2:12 PM CDT -----  Regarding: Appt  Contact: [phone]  Who call ? Nereida Narayanan What is the request Details : Pt calling to speak with someone in provider office regards r/s appt on 4/24.  Please call pt back.  Can clinic  use patient portal  : No What number to call back : [phone]

## 2025-04-23 NOTE — TELEPHONE ENCOUNTER
Spoke with pt  in regard to rescheduling her f/u appt wit Dr. Lawson. Pt has been rescheduled and verbalized understanding of new appt time/date.

## 2025-04-24 ENCOUNTER — OFFICE VISIT (OUTPATIENT)
Dept: SPORTS MEDICINE | Facility: CLINIC | Age: 50
End: 2025-04-24
Payer: COMMERCIAL

## 2025-04-24 VITALS
BODY MASS INDEX: 39 KG/M2 | SYSTOLIC BLOOD PRESSURE: 129 MMHG | WEIGHT: 198.63 LBS | HEIGHT: 60 IN | DIASTOLIC BLOOD PRESSURE: 82 MMHG

## 2025-04-24 DIAGNOSIS — M25.562 CHRONIC PAIN OF BOTH KNEES: Primary | ICD-10-CM

## 2025-04-24 DIAGNOSIS — G89.29 CHRONIC PAIN OF BOTH KNEES: Primary | ICD-10-CM

## 2025-04-24 DIAGNOSIS — M25.561 CHRONIC PAIN OF BOTH KNEES: Primary | ICD-10-CM

## 2025-04-24 DIAGNOSIS — M17.0 PRIMARY OSTEOARTHRITIS OF BOTH KNEES: ICD-10-CM

## 2025-04-24 PROCEDURE — 99999 PR PBB SHADOW E&M-EST. PATIENT-LVL III: CPT | Mod: PBBFAC,,, | Performed by: ORTHOPAEDIC SURGERY

## 2025-04-24 RX ORDER — TRIAMCINOLONE ACETONIDE 40 MG/ML
40 INJECTION, SUSPENSION INTRA-ARTICULAR; INTRAMUSCULAR
Status: COMPLETED | OUTPATIENT
Start: 2025-04-24 | End: 2025-04-24

## 2025-04-24 RX ADMIN — TRIAMCINOLONE ACETONIDE 40 MG: 40 INJECTION, SUSPENSION INTRA-ARTICULAR; INTRAMUSCULAR at 01:04

## 2025-04-24 NOTE — PROGRESS NOTES
CC: bilateral knee pain    Nereida is here today for follow up evaluation of her bilateral knee pain. Patient reports her pain is ***/10 today. Her most recent bilateral knee CSI was at visit 01/09/2025.     Recall from visit on 02/06/2025  Nereida is here today for follow up evaluation of her bilateral knee pain. Patient reports her pain is 8/10 today. Her most recent bilateral knee aspiration was 01/09/2025 and her most recent bilateral knee CSI was at visit 09/30/2024 and she also completed bilateral knee Orthovisc series without improvement 11/20/2024. She is interested in repeat CSI today if indicated, as she has been working long hours for the [employer].     Recall from visit on 01/09/2025  Nereida is here today for follow up evaluation of her bilateral knee pain. Patient reports her pain is 7/10 today. She completed bilateral knee Orthovisc series 11/20/2024 and denies appreciating improvement in her pain following. Her most recent bilateral knee aspiration and CSI was 10/31/2024 with Imer Hanson PA-C. She is interested in having both of her knees drained today as she is not yet eligible for CSI.     Recall from visit on 09/30/2024  Nereida is here today for follow up evaluation of her bilateral knee pain. Patient reports her pain is 7/10 today. She most recently had bilateral intra-articular knee Zilretta injections at visit 07/26/2024 and admits to appreciating improvement in her pain following. She states she is not feeling as swollen today. She also admits to left ankle swelling over the past 1 week without mechanism of injury.      PAST MEDICAL HISTORY:   Past Medical History:   Diagnosis Date    Allergy     Bilateral chronic knee pain     Morbid obesity with BMI of 45.0-49.9, adult     Osteoarthritis of both knees        PAST SURGICAL HISTORY:   Past Surgical History:   Procedure Laterality Date    ARTHROSCOPIC CHONDROPLASTY OF KNEE JOINT Right 9/9/2020    Procedure: ARTHROSCOPY, KNEE, WITH  CHONDROPLASTY;  Surgeon: Aiden Clarke DO;  Location: [facility] OR;  Service: Orthopedics;  Laterality: Right;     SECTION      KNEE ARTHROSCOPY W/ MENISCECTOMY Right 2020    Procedure: ARTHROSCOPY, KNEE, WITH MENISCECTOMY;  Surgeon: Aiden Clarke DO;  Location: [facility] OR;  Service: Orthopedics;  Laterality: Right;  Equipment: Scope; [employer] Tr[employer]par Meniscus Repair System; [place] chondral drill set  Vendor: [employer]; [employer]  Table: Supine    KNEE ARTHROSCOPY W/ PLICA EXCISION Right 2020    Procedure: EXCISION, PLICA, KNEE, ARTHROSCOPIC;  Surgeon: Aiden Clarke DO;  Location: [facility] OR;  Service: Orthopedics;  Laterality: Right;       FAMILY HISTORY:   Family History   Problem Relation Name Age of Onset    Hypertension Mother      Alcohol abuse Father Yaya         none    Breast cancer Neg Hx      Ovarian cancer Neg Hx         SOCIAL HISTORY:   Social History     Socioeconomic History    Marital status: Single   Tobacco Use    Smoking status: Never    Smokeless tobacco: Never   Substance and Sexual Activity    Alcohol use: No    Drug use: No    Sexual activity: Yes     Partners: Male     Birth control/protection: None     Social Drivers of Health     Financial Resource Strain: Low Risk  (3/27/2025)    Overall Financial Resource Strain (CARDIA)     Difficulty of Paying Living Expenses: Not hard at all   Food Insecurity: No Food Insecurity (3/27/2025)    Hunger Vital Sign     Worried About Running Out of Food in the Last Year: Never true     Ran Out of Food in the Last Year: Never true   Transportation Needs: No Transportation Needs (3/27/2025)    PRAPARE - Transportation     Lack of Transportation (Medical): No     Lack of Transportation (Non-Medical): No   Physical Activity: Sufficiently Active (3/27/2025)    Exercise Vital Sign     Days of Exercise per Week: 5 days     Minutes of Exercise per Session: 30 min   Stress: No Stress Concern Present (3/27/2025)    [employer]  - Occupational Stress Questionnaire     Feeling of Stress : Not at all   Housing Stability: Low Risk  (3/27/2025)    Housing Stability Vital Sign     Unable to Pay for Housing in the Last Year: No     Homeless in the Last Year: No       MEDICATIONS:     Current Outpatient Medications:     cetirizine (ZYRTEC) 10 MG tablet, Take 1 tablet (10 mg total) by mouth once daily. (Patient not taking: Reported on 3/21/2025), Disp: 90 tablet, Rfl: 0    cyanocobalamin 1,000 mcg/mL injection, Inject 1 mL (1,000 mcg total) into the skin every 28 days., Disp: 1 mL, Rfl: 4    diclofenac sodium (VOLTAREN) 1 % Gel, Apply 2 g topically 4 (four) times daily. (Patient not taking: Reported on 2/13/2025), Disp: 20 g, Rfl: 2    fluticasone propionate (FLONASE) 50 mcg/actuation nasal spray, 1 spray (50 mcg total) by Each Nostril route once daily. (Patient not taking: Reported on 3/21/2025), Disp: 11.1 mL, Rfl: 0    furosemide (LASIX) 40 MG tablet, Take 1 tablet (40 mg total) by mouth once daily., Disp: 30 tablet, Rfl: 3    gabapentin (NEURONTIN) 300 MG capsule, Take 1 capsule (300 mg total) by mouth 3 (three) times daily., Disp: 90 capsule, Rfl: 2    ibuprofen (ADVIL,MOTRIN) 800 MG tablet, Take 1 tablet (800 mg total) by mouth 2 (two) times daily as needed for Pain., Disp: 90 tablet, Rfl: 0    LIDOcaine-prilocaine (EMLA) cream, Apply topically as needed. (Patient not taking: Reported on 3/21/2025), Disp: 30 g, Rfl: 5    meloxicam (MOBIC) 15 MG tablet, Take 1 tablet (15 mg total) by mouth once daily., Disp: 90 tablet, Rfl: 0    methocarbamoL (ROBAXIN) 500 MG Tab, Take 1 tablet (500 mg total) by mouth 3 (three) times daily as needed (pain). (Patient not taking: Reported on 3/21/2025), Disp: 45 tablet, Rfl: 0    semaglutide, weight loss, (WEGOVY) 1.7 mg/0.75 mL PnIj, Inject 1.7 mg into the skin every 7 days., Disp: 9 mL, Rfl: 0    ALLERGIES:   Review of patient's allergies indicates:   Allergen Reactions    Adhesive      Causes skin tearing and  redness         PHYSICAL EXAMINATION:  There were no vitals taken for this visit.  Vitals signs and nursing note have been reviewed.  General: In no acute distress, well developed, well nourished, no diaphoresis  Eyes: EOM full and smooth, no eye redness or discharge  HENT: normocephalic and atraumatic, neck supple, trachea midline, no nasal discharge, no external ear redness or discharge  Cardiovascular: 2+ and symmetric DP pulses bilaterally, no LE edema  Lungs: respirations non-labored, no conversational dyspnea   Abd: non-distended, no rigidity  MSK: no amputation or deformity, no swelling of extremities  Neuro: AAOx3, CN2-12 grossly intact  Skin: No rashes, warm and dry  Psychiatric: cooperative, pleasant, mood and affect appropriate for age    MUSCULOSKELETAL EXAM:    BILATERAL KNEE EXAMINATION   Affected side is compared to contralateral knee     Observation:  No edema, erythema, ecchymosis noted. Suprapatellar effusion on right  No muscle atrophy of the thighs and calves noted.  No obvious bony deformities noted.   No genu valgus/varum noted. No recurvatum noted.      Tenderness:  Patella - + bilaterally   Lateral joint line - + bilaterally  Quad tendon - none   Medial joint line - + bilaterally  Patellar tendon - none  Medial plica - none  Tibial tubercle - none   Lateral plica - none  Pes anserine - + b/l   MCL prox - none  Distal ITB - none   MCL distal - none  MFC - none    LCL prox - none  LFC - none    LCL distal - none  Tibia - none    Fibula - none    No obvious bursae, plicae, popliteal cysts, or tendon derangement palpated.          ROM:   Active extension to 0° on left without hyperextension, lag, or patellar J sign.   Active extension to 0° on right without hyperextension, lag, or patellar J sign.   + crepitus with extension bilaterally  Active flexion to 120° on left and 120° on right.    Strength: (bilaterally)  Knee Flexion - 5/5 on left and 5/5 on right  Knee Extension - 5/5 on left and 5/5  on right  Hip Flexion - 5/5 on left and 5/5 on right  Hip Extension - 5/5 on left and 5/5 on right  Ankle dorsiflexion - 5/5 on left and 5/5 on right  Ankle Plantarflexion - 5/5 on left and 5/5 on right    Patellofemoral Exam:  Patellar ballottement - positive on right  Bulge sign - positive on right  Patellar grind - negative on left, positive on right  No patellar laxity with medial and lateral translation   No apprehension with medial and lateral patellar translation.     Ligament Testing:  Lachman's test - negative  No laxity with varus testing at 0 and 30 degrees.  No laxity with valgus testing at 0 and 30 degrees.    Neurovascular Examination:   Antalgic gait  Sensation intact to light touch in the obturator, lateral/intermediate/medial/posterior femoral cutaneous, saphenous, and common peroneal nerves bilaterally.  Pulses intact at the DP and PT arteries bilaterally.    Capillary refill intact <2 seconds in all toes bilaterally.    IMAGIN. X-ray obtained on 2022 due to bilateral knee pain  2. X-ray images were reviewed personally by me and then directly with patient.  3. FINDINGS: X-ray images obtained demonstrate tricompartmental degenerative changes, most pronounced in the medial compartment bilaterally.  Kellgren and Clay grade 3  4. IMPRESSION: As above.     1. X-ray obtained 2024 due to bilateral knee pain   2. X-ray images were reviewed personally by me and then directly with patient.  3. FINDINGS: X-ray images obtained demonstrate nonspecific suprapatellar joint effusion on the right and possible suprapatellar joint effusion on the left, tricompartmental degenerative changes, most pronounced at the medial compartment and progressed from . Kellgren and Clay grade 3  4. IMPRESSION: As above.       PROCEDURE:  Large Joint Aspiration/Injection  Knee joint, bilateral  Performed by: PEYMAN MCGUIRE  Authorized by: PEYMAN MCGUIRE  Consent Done?: Yes (Verbal)  Indications: Pain  "and joint effusion  Site marked: The procedure site was marked   Timeout: Prior to procedure the correct patient, procedure, and site was verified   Location: Knee joint, bilateral  Prep: Patient was prepped with Chlorhexidine and alcohol.  Ultrasound Guidance for needle placement: yes    Procedure RIGHT: Ethyl Chloride spray was used prior to skin puncture. After cold spray was applied, 2-4 cc's of 0.2% ropivacaine was injected into the skin and superficial tissue at the injection site using a 25G, 1.5" needle to form an anesthetic tunnel and ensure proper placement of the needle into the joint space. After local anesthetic was applied an 18G, 1.5 needle was used to enter the knee joint capsule under US guidance. 25 cc of clear synovial fluid was aspirated. Following aspiration, a 3 cc mixture of 1 cc of 40 mg/ml triamcinolone acetonide and 2 cc of 0.2% ropivacaine were injected into the knee joint.     Procedure LEFT: Ethyl Chloride spray was used prior to skin puncture. After cold spray was applied, 2-4 cc's of 0.2% ropivacaine was injected into the skin and superficial tissue at the injection site using a 25G, 1.5" needle to form an anesthetic tunnel and ensure proper placement of the needle into the joint space. After local anesthetic was applied an 18G, 1.5 needle was used to enter the knee joint capsule under US guidance. 8 cc of bloody synovial fluid was aspirated. Following aspiration, a 3 cc mixture of 1 cc of 40 mg/ml triamcinolone acetonide and 2 cc of 0.2% ropivacaine were injected into the knee joint.     Approach: superiorlateral  Medications: 40 mg triamcinolone acetonide 40 mg/mL  Patient tolerance: Patient tolerated the procedure well with no immediate complications. Improvement noted after aspiration. Patient was wrapped following bandaging.    Ultrasound guidance was used for needle localization with [employer] Edge 2, 15-6 MHz (L)probe(s). Images were saved and stored for documentation. Short " and long axis images of the anterior knee were taken prior to injection confirming presence of joint effusion. The knee joint well visualized. Dynamic visualization of the needles was continuous throughout the procedure and maintained good position and correct needle placement.    Triamcinolone: x2  NDC: [address]-1  LOT: [id number]  EXP: 2026-04       ASSESSMENT:      ICD-10-CM ICD-9-CM   1. Chronic pain of both knees  M25.561 719.46    M25.562 338.29    G89.29    2. Primary osteoarthritis of both knees  M17.0 715.16         PLAN:  1-2.  Chronic bilateral knee pain/osteoarthritis - improved, recent exacerbation    - Nereida is following up today for her chronic bilateral knee pain which has been slowly worsening over several years.  She has also been appreciating increased swelling and instability which worsens with walking and long periods of standing at work.    - Her most recent bilateral knee aspiration was 01/09/2025 and her most recent bilateral knee CSI was at visit 09/30/2024 and she also completed bilateral knee Orthovisc series without improvement 11/20/2024. She is interested in repeat CSI today if indicated as she is working long hours for the [employer].     - We reviewed the natural history of osteoarthritis and a multipronged treatment approach. We reviewed the importance of addressing three different aspects to best manage this condition. Controlling the intra-articular immune reaction through medications and/or injections, improving local stability through bracing and/or injection, and improving functional stability through hip, core, and ankle strength, stability and mobility which may benefit from formal physical therapy.    - After discussing options she elects to proceed with ultrasound guided bilateral knee aspirations. See above for procedure detail.     - Continue with bilateral medial hinged knee braces during activity.       Future planning includes - possible repeat bilateral  intra-articular knee CSI in 3 months (education provided on no surgery x 3 months following CSI), follow up with Dr. Barrios as scheduled    All questions were answered to the best of my ability and all concerns were addressed at this time.    Follow up in 3 months for above.

## 2025-04-25 ENCOUNTER — DOCUMENTATION ONLY (OUTPATIENT)
Dept: BARIATRICS | Facility: CLINIC | Age: 50
End: 2025-04-25
Payer: COMMERCIAL

## 2025-04-30 ENCOUNTER — PATIENT MESSAGE (OUTPATIENT)
Dept: SPORTS MEDICINE | Facility: CLINIC | Age: 50
End: 2025-04-30
Payer: COMMERCIAL

## 2025-05-01 DIAGNOSIS — M25.561 CHRONIC PAIN OF BOTH KNEES: ICD-10-CM

## 2025-05-01 DIAGNOSIS — M17.0 PRIMARY OSTEOARTHRITIS OF BOTH KNEES: ICD-10-CM

## 2025-05-01 DIAGNOSIS — G89.29 CHRONIC PAIN OF BOTH KNEES: ICD-10-CM

## 2025-05-01 DIAGNOSIS — M25.562 CHRONIC PAIN OF BOTH KNEES: ICD-10-CM

## 2025-05-01 RX ORDER — IBUPROFEN 800 MG/1
800 TABLET ORAL 2 TIMES DAILY PRN
Qty: 90 TABLET | Refills: 0 | Status: SHIPPED | OUTPATIENT
Start: 2025-05-01

## 2025-05-02 RX ORDER — CYANOCOBALAMIN 1000 UG/ML
1000 INJECTION, SOLUTION INTRAMUSCULAR; SUBCUTANEOUS
Qty: 1 ML | Refills: 4 | Status: SHIPPED | OUTPATIENT
Start: 2025-05-02

## 2025-05-08 ENCOUNTER — PATIENT MESSAGE (OUTPATIENT)
Dept: BARIATRICS | Facility: CLINIC | Age: 50
End: 2025-05-08
Payer: COMMERCIAL

## 2025-05-12 ENCOUNTER — PATIENT MESSAGE (OUTPATIENT)
Dept: FAMILY MEDICINE | Facility: CLINIC | Age: 50
End: 2025-05-12
Payer: COMMERCIAL

## 2025-05-12 ENCOUNTER — PATIENT MESSAGE (OUTPATIENT)
Dept: GASTROENTEROLOGY | Facility: CLINIC | Age: 50
End: 2025-05-12
Payer: COMMERCIAL

## 2025-05-15 ENCOUNTER — PATIENT MESSAGE (OUTPATIENT)
Dept: FAMILY MEDICINE | Facility: CLINIC | Age: 50
End: 2025-05-15
Payer: COMMERCIAL

## 2025-05-23 ENCOUNTER — OFFICE VISIT (OUTPATIENT)
Dept: FAMILY MEDICINE | Facility: CLINIC | Age: 50
End: 2025-05-23
Payer: COMMERCIAL

## 2025-05-23 ENCOUNTER — PATIENT MESSAGE (OUTPATIENT)
Dept: FAMILY MEDICINE | Facility: CLINIC | Age: 50
End: 2025-05-23

## 2025-05-23 VITALS
RESPIRATION RATE: 12 BRPM | BODY MASS INDEX: 40.16 KG/M2 | WEIGHT: 204.56 LBS | OXYGEN SATURATION: 99 % | SYSTOLIC BLOOD PRESSURE: 134 MMHG | DIASTOLIC BLOOD PRESSURE: 78 MMHG | HEIGHT: 60 IN | TEMPERATURE: 98 F | HEART RATE: 78 BPM

## 2025-05-23 DIAGNOSIS — E66.812 CLASS 2 OBESITY WITH BODY MASS INDEX (BMI) OF 39.0 TO 39.9 IN ADULT, UNSPECIFIED OBESITY TYPE, UNSPECIFIED WHETHER SERIOUS COMORBIDITY PRESENT: Primary | ICD-10-CM

## 2025-05-23 PROCEDURE — 99999 PR PBB SHADOW E&M-EST. PATIENT-LVL IV: CPT | Mod: PBBFAC,,,

## 2025-05-23 RX ORDER — SEMAGLUTIDE 2.4 MG/.75ML
2.4 INJECTION, SOLUTION SUBCUTANEOUS
Qty: 9 ML | Refills: 1 | Status: SHIPPED | OUTPATIENT
Start: 2025-05-23 | End: 2025-11-19

## 2025-05-23 NOTE — PROGRESS NOTES
Subjective:       Patient ID:  [id number]     Chief Complaint: Weight Management NEW      History of Present Illness    Ms. Narayanan presents today to discuss weight loss medication management. She has been on Wegovy injections since August of last year, currently at 1.7 mg dose for the past 2-3 months. She reports the treatment has been effective with weight reduction from 240 to 204 lbs.   No other concerns.         Past Medical History:   Diagnosis Date    Allergy     Bilateral chronic knee pain     Morbid obesity with BMI of 45.0-49.9, adult     Osteoarthritis of both knees       Active Problem List with Overview Notes    Diagnosis Date Noted    BMI 38.0-38.9,adult 03/23/2025    Left foot pain 01/29/2023    Osteoarthritis of ankle and foot 01/26/2023    BMI 45.0-49.9, adult 01/06/2023    Osteoarthritis of both knees 12/30/2022    Morbid obesity 05/10/2022    Trichimoniasis 11/30/2021    Weakness 10/07/2020    Knee pain, right 10/07/2020      Review of patient's allergies indicates:   Allergen Reactions    Adhesive      Causes skin tearing and redness        Current Medications[1]    Lab Results   Component Value Date    WBC 11.37 04/21/2024    HGB 11.7 (L) 04/21/2024    HCT 36.7 (L) 04/21/2024     04/21/2024    CHOL 216 (H) 02/27/2025    TRIG 113 02/27/2025    HDL 53 02/27/2025    ALT 12 03/24/2025    AST 22 03/24/2025     03/24/2025    K 3.9 03/24/2025     03/24/2025    CREATININE 0.6 03/24/2025    BUN 19 03/24/2025    CO2 24 03/24/2025    TSH 2.890 08/24/2023    INR 1.1 09/03/2020    HGBA1C 4.9 03/24/2025       Review of Systems   Constitutional:  Negative for fatigue and fever.   HENT: Negative.  Negative for congestion, sneezing and sore throat.    Eyes: Negative.    Respiratory:  Negative for cough, shortness of breath and wheezing.    Cardiovascular:  Negative for chest pain, palpitations and leg swelling.   Gastrointestinal:  Negative for abdominal pain, nausea and vomiting.    Genitourinary: Negative.    Musculoskeletal: Negative.  Negative for arthralgias.   Skin: Negative.  Negative for rash.   Neurological:  Negative for dizziness, weakness, light-headedness, numbness and headaches.   Hematological: Negative.    Psychiatric/Behavioral: Negative.         Objective:      Physical Exam  Constitutional:       Appearance: Normal appearance. She is obese.   HENT:      Head: Normocephalic and atraumatic.      Nose: Nose normal.   Eyes:      Extraocular Movements: Extraocular movements intact.   Cardiovascular:      Rate and Rhythm: Normal rate and regular rhythm.   Pulmonary:      Effort: Pulmonary effort is normal.      Breath sounds: Normal breath sounds.   Musculoskeletal:         General: Normal range of motion.      Cervical back: Normal range of motion.   Skin:     General: Skin is warm and dry.   Neurological:      General: No focal deficit present.      Mental Status: She is alert and oriented to person, place, and time.   Psychiatric:         Mood and Affect: Mood normal.         Assessment:       1. Class 2 obesity with body mass index (BMI) of 39.0 to 39.9 in adult, unspecified obesity type, unspecified whether serious comorbidity present        Plan:       Nereida was seen today for weight management new.    Diagnoses and all orders for this visit:    Class 2 obesity with body mass index (BMI) of 39.0 to 39.9 in adult, unspecified obesity type, unspecified whether serious comorbidity present  -     semaglutide, weight loss, (WEGOVY) 2.4 mg/0.75 mL PnIj; Inject 2.4 mg into the skin every 7 days.       - Assessed current weight loss medication regimen.  - Increased Wegovy from 1.7 mg to 2.4 mg weekly injection based on positive response after 2-3 months on current dose.  - Ms. Narayanan instructed to send a message if there are any issues with the Wegovy prescription.           Future Appointments       Date Provider Specialty Appt Notes    7/8/2025 Albina Lawson MD  Bariatrics f/u [facility] (self pay $95.00)    7/24/2025 Tang Allison, DO Sports Medicine f/u [facility],poss CSI               Portions of this note were dictated using voice recognition software and may contain dictation related errors in spelling / grammar / syntax not discovered on text review.     Angela Douglas PA-C         [1]   Current Outpatient Medications:     cyanocobalamin 1,000 mcg/mL injection, Inject 1 mL (1,000 mcg total) into the skin every 28 days., Disp: 1 mL, Rfl: 4    ibuprofen (ADVIL,MOTRIN) 800 MG tablet, Take 1 tablet (800 mg total) by mouth 2 (two) times daily as needed for Pain., Disp: 90 tablet, Rfl: 0    meloxicam (MOBIC) 15 MG tablet, Take 1 tablet (15 mg total) by mouth once daily., Disp: 90 tablet, Rfl: 0    cetirizine (ZYRTEC) 10 MG tablet, Take 1 tablet (10 mg total) by mouth once daily., Disp: 90 tablet, Rfl: 0    diclofenac sodium (VOLTAREN) 1 % Gel, Apply 2 g topically 4 (four) times daily. (Patient not taking: Reported on 5/23/2025), Disp: 20 g, Rfl: 2    fluticasone propionate (FLONASE) 50 mcg/actuation nasal spray, 1 spray (50 mcg total) by Each Nostril route once daily. (Patient not taking: Reported on 5/23/2025), Disp: 11.1 mL, Rfl: 0    furosemide (LASIX) 40 MG tablet, Take 1 tablet (40 mg total) by mouth once daily., Disp: 30 tablet, Rfl: 3    gabapentin (NEURONTIN) 300 MG capsule, Take 1 capsule (300 mg total) by mouth 3 (three) times daily., Disp: 90 capsule, Rfl: 2    LIDOcaine-prilocaine (EMLA) cream, Apply topically as needed. (Patient not taking: Reported on 5/23/2025), Disp: 30 g, Rfl: 5    methocarbamoL (ROBAXIN) 500 MG Tab, Take 1 tablet (500 mg total) by mouth 3 (three) times daily as needed (pain). (Patient not taking: Reported on 5/23/2025), Disp: 45 tablet, Rfl: 0    semaglutide, weight loss, (WEGOVY) 2.4 mg/0.75 mL PnIj, Inject 2.4 mg into the skin every 7 days., Disp: 9 mL, Rfl: 1

## 2025-05-23 NOTE — LETTER
May 23, 2025      [facility]  [address]  [place] [address]  Phone: [phone]  Fax: [phone]       Patient: Nereida Narayanan   YOB: 1975  Date of Visit: 05/23/2025    To Whom It May Concern:    Yasmine Narayanan  was at [facility] on 05/23/2025. The patient may return to work/school on 5/24/2025 with no restrictions. If you have any questions or concerns, or if I can be of further assistance, please do not hesitate to contact me.    Sincerely,    Angela Douglas PA-C

## 2025-05-27 ENCOUNTER — TELEPHONE (OUTPATIENT)
Dept: FAMILY MEDICINE | Facility: CLINIC | Age: 50
End: 2025-05-27
Payer: COMMERCIAL

## 2025-05-27 DIAGNOSIS — E66.812 CLASS 2 OBESITY WITH BODY MASS INDEX (BMI) OF 39.0 TO 39.9 IN ADULT, UNSPECIFIED OBESITY TYPE, UNSPECIFIED WHETHER SERIOUS COMORBIDITY PRESENT: ICD-10-CM

## 2025-05-27 NOTE — TELEPHONE ENCOUNTER
Writer received prior authorization notice in Epic for Wegovy.  Prior authorization questions answered and submitted in "[employer] Awaiting insurance decision regarding coverage.       PA Denied. Reason This drug is not covered for any service related to the treatment of Weight loss.

## 2025-06-01 ENCOUNTER — PATIENT MESSAGE (OUTPATIENT)
Dept: FAMILY MEDICINE | Facility: CLINIC | Age: 50
End: 2025-06-01
Payer: COMMERCIAL

## 2025-06-02 ENCOUNTER — OFFICE VISIT (OUTPATIENT)
Dept: FAMILY MEDICINE | Facility: CLINIC | Age: 50
End: 2025-06-02
Payer: COMMERCIAL

## 2025-06-02 ENCOUNTER — TELEPHONE (OUTPATIENT)
Dept: NUTRITION | Facility: CLINIC | Age: 50
End: 2025-06-02
Payer: COMMERCIAL

## 2025-06-02 VITALS
SYSTOLIC BLOOD PRESSURE: 128 MMHG | BODY MASS INDEX: 40.64 KG/M2 | HEIGHT: 60 IN | DIASTOLIC BLOOD PRESSURE: 76 MMHG | OXYGEN SATURATION: 100 % | HEART RATE: 63 BPM | WEIGHT: 207 LBS | TEMPERATURE: 98 F

## 2025-06-02 DIAGNOSIS — M54.9 ACUTE RIGHT-SIDED BACK PAIN, UNSPECIFIED BACK LOCATION: Primary | ICD-10-CM

## 2025-06-02 DIAGNOSIS — G89.29 CHRONIC PAIN OF RIGHT KNEE: ICD-10-CM

## 2025-06-02 DIAGNOSIS — M25.561 CHRONIC PAIN OF RIGHT KNEE: ICD-10-CM

## 2025-06-02 PROCEDURE — 3008F BODY MASS INDEX DOCD: CPT | Mod: CPTII,S$GLB,,

## 2025-06-02 PROCEDURE — 1160F RVW MEDS BY RX/DR IN RCRD: CPT | Mod: CPTII,S$GLB,,

## 2025-06-02 PROCEDURE — 96372 THER/PROPH/DIAG INJ SC/IM: CPT | Mod: S$GLB,,,

## 2025-06-02 PROCEDURE — 3044F HG A1C LEVEL LT 7.0%: CPT | Mod: CPTII,S$GLB,,

## 2025-06-02 PROCEDURE — 99999 PR PBB SHADOW E&M-EST. PATIENT-LVL IV: CPT | Mod: PBBFAC,,,

## 2025-06-02 PROCEDURE — 3074F SYST BP LT 130 MM HG: CPT | Mod: CPTII,S$GLB,,

## 2025-06-02 PROCEDURE — 99214 OFFICE O/P EST MOD 30 MIN: CPT | Mod: 25,S$GLB,,

## 2025-06-02 PROCEDURE — 3078F DIAST BP <80 MM HG: CPT | Mod: CPTII,S$GLB,,

## 2025-06-02 PROCEDURE — 1159F MED LIST DOCD IN RCRD: CPT | Mod: CPTII,S$GLB,,

## 2025-06-02 RX ORDER — GABAPENTIN 300 MG/1
300 CAPSULE ORAL 3 TIMES DAILY
Qty: 90 CAPSULE | Refills: 2 | Status: SHIPPED | OUTPATIENT
Start: 2025-06-02 | End: 2025-08-31

## 2025-06-02 RX ORDER — DEXAMETHASONE SODIUM PHOSPHATE 4 MG/ML
8 INJECTION, SOLUTION INTRA-ARTICULAR; INTRALESIONAL; INTRAMUSCULAR; INTRAVENOUS; SOFT TISSUE ONCE
Status: COMPLETED | OUTPATIENT
Start: 2025-06-02 | End: 2025-06-02

## 2025-06-02 RX ORDER — TIZANIDINE 2 MG/1
4 TABLET ORAL EVERY 6 HOURS PRN
Qty: 30 TABLET | Refills: 0 | Status: SHIPPED | OUTPATIENT
Start: 2025-06-02 | End: 2025-06-12

## 2025-06-02 RX ADMIN — DEXAMETHASONE SODIUM PHOSPHATE 8 MG: 4 INJECTION, SOLUTION INTRA-ARTICULAR; INTRALESIONAL; INTRAMUSCULAR; INTRAVENOUS; SOFT TISSUE at 03:06

## 2025-06-03 ENCOUNTER — NUTRITION (OUTPATIENT)
Dept: NUTRITION | Facility: CLINIC | Age: 50
End: 2025-06-03
Payer: COMMERCIAL

## 2025-06-03 VITALS — BODY MASS INDEX: 40.9 KG/M2 | WEIGHT: 208.31 LBS | HEIGHT: 60 IN

## 2025-06-03 DIAGNOSIS — E66.812 CLASS 2 OBESITY WITH BODY MASS INDEX (BMI) OF 39.0 TO 39.9 IN ADULT, UNSPECIFIED OBESITY TYPE, UNSPECIFIED WHETHER SERIOUS COMORBIDITY PRESENT: Primary | ICD-10-CM

## 2025-06-03 DIAGNOSIS — Z71.3 DIETARY COUNSELING: ICD-10-CM

## 2025-06-03 PROCEDURE — 97802 MEDICAL NUTRITION INDIV IN: CPT | Mod: S$GLB,,, | Performed by: DIETITIAN, REGISTERED

## 2025-06-03 PROCEDURE — 99999 PR PBB SHADOW E&M-EST. PATIENT-LVL III: CPT | Mod: PBBFAC,,,

## 2025-06-04 ENCOUNTER — TELEPHONE (OUTPATIENT)
Dept: NUTRITION | Facility: CLINIC | Age: 50
End: 2025-06-04
Payer: COMMERCIAL

## 2025-06-06 ENCOUNTER — PATIENT MESSAGE (OUTPATIENT)
Dept: FAMILY MEDICINE | Facility: CLINIC | Age: 50
End: 2025-06-06
Payer: COMMERCIAL

## 2025-06-06 ENCOUNTER — TELEPHONE (OUTPATIENT)
Dept: FAMILY MEDICINE | Facility: CLINIC | Age: 50
End: 2025-06-06
Payer: COMMERCIAL

## 2025-06-07 DIAGNOSIS — E66.812 CLASS 2 OBESITY WITH BODY MASS INDEX (BMI) OF 39.0 TO 39.9 IN ADULT, UNSPECIFIED OBESITY TYPE, UNSPECIFIED WHETHER SERIOUS COMORBIDITY PRESENT: ICD-10-CM

## 2025-06-07 RX ORDER — SEMAGLUTIDE 2.4 MG/.75ML
2.4 INJECTION, SOLUTION SUBCUTANEOUS
Qty: 9 ML | Refills: 1 | Status: CANCELLED | OUTPATIENT
Start: 2025-06-07 | End: 2025-12-04

## 2025-06-09 ENCOUNTER — PATIENT MESSAGE (OUTPATIENT)
Dept: FAMILY MEDICINE | Facility: CLINIC | Age: 50
End: 2025-06-09
Payer: COMMERCIAL

## (undated) DEVICE — SUT ETHILON 3-0 FS-1 30

## (undated) DEVICE — TUBING ARTHROSCOPY

## (undated) DEVICE — GAUZE SPONGE 4X4 12PLY

## (undated) DEVICE — GLOVE PI ULTRA TOUCH G SURGEON

## (undated) DEVICE — NDL ANES SPINAL 18X3.5ST 18G

## (undated) DEVICE — SEE MEDLINE ITEM 146231

## (undated) DEVICE — SOL LAC RINGERS IRR 3000ML

## (undated) DEVICE — SEE MEDLINE ITEM 152530

## (undated) DEVICE — SEE MEDLINE ITEM 157169

## (undated) DEVICE — NDL ECLIPSE SAFETY 18GX1-1/2IN

## (undated) DEVICE — SYR 3CC LUER LOC

## (undated) DEVICE — SPONGE/BRUSH SCRUB SURG PCMX

## (undated) DEVICE — SYR 30CC LUER LOCK

## (undated) DEVICE — ELECTRODE COOLPULSE 90 W/HAND

## (undated) DEVICE — TOURNIQUET SB QC SP 34X4IN

## (undated) DEVICE — DRESSING N ADH OIL EMUL 3X3

## (undated) DEVICE — SHEET DRAPE FAN-FOLDED 3/4

## (undated) DEVICE — DRAPE STERI INSTRUMENT 1018

## (undated) DEVICE — CUTTER MENISCUS AGGRESSIVE 4.0

## (undated) DEVICE — DRAPE STERI U-SHAPED 47X51IN

## (undated) DEVICE — SEE MEDLINE ITEM 156964

## (undated) DEVICE — NDL FILTER 19GA X 1-1/2

## (undated) DEVICE — JUG OMNI (LG VOLUME FLUID)

## (undated) DEVICE — SEE MEDLINE ITEM 157216

## (undated) DEVICE — TUBING SUCTION 3/16X10 2 CONN

## (undated) DEVICE — GLOVE SURGEONS ULTRA TOUCH 6.5

## (undated) DEVICE — DRAPE PLASTIC U 60X72